# Patient Record
Sex: MALE | Race: WHITE | Employment: FULL TIME | URBAN - METROPOLITAN AREA
[De-identification: names, ages, dates, MRNs, and addresses within clinical notes are randomized per-mention and may not be internally consistent; named-entity substitution may affect disease eponyms.]

---

## 2017-02-20 DIAGNOSIS — E11.21 TYPE 2 DIABETES MELLITUS WITH DIABETIC NEPHROPATHY, WITHOUT LONG-TERM CURRENT USE OF INSULIN (H): Primary | ICD-10-CM

## 2017-02-20 NOTE — TELEPHONE ENCOUNTER
Metformin 1000mg         Last Written Prescription Date: 10/25/16  Last Fill Quantity: 60, # refills: 0  Last Office Visit with [facility], [facility] or  Health prescribing provider:  12/22/15   Next 5 appointments (look out 90 days)     Feb 24, 2017  8:20 AM CST   SHORT with Omar Parker MD   [facility] ([facility])    [address] [place] [id number] [phone]                   BP Readings from Last 3 Encounters:   08/25/16 130/84   12/30/15 128/74   12/22/15 130/87     Lab Results   Component Value Date    MICROL 21 06/24/2015     No results found for: MICROALBUMIN  Creatinine   Date Value Ref Range Status   12/29/2015 0.69 0.66 - 1.25 mg/dL Final   ]  GFR Estimate   Date Value Ref Range Status   12/29/2015 >90  Non  GFR Calc   >60 mL/min/1.7m2 Final   12/22/2015 >90  Non  GFR Calc   >60 mL/min/1.7m2 Final   06/24/2015 >90  Non  GFR Calc   >60 mL/min/1.7m2 Final     GFR Estimate If Black   Date Value Ref Range Status   12/29/2015 >90   GFR Calc   >60 mL/min/1.7m2 Final   12/22/2015 >90   GFR Calc   >60 mL/min/1.7m2 Final   06/24/2015 >90   GFR Calc   >60 mL/min/1.7m2 Final     Lab Results   Component Value Date    CHOL 181 06/24/2015     Lab Results   Component Value Date    HDL 39 06/24/2015     Lab Results   Component Value Date     06/24/2015    LDL 98 06/21/2013     Lab Results   Component Value Date    TRIG 181 06/24/2015     Lab Results   Component Value Date    CHOLHDLRATIO 4.6 06/24/2015     Lab Results   Component Value Date    AST 23 12/22/2015     Lab Results   Component Value Date    ALT 65 12/22/2015     Lab Results   Component Value Date    A1C 7.8 12/22/2015    A1C 9.6 06/24/2015    A1C 6.4 06/25/2014    A1C 5.4 10/15/2013    A1C 8.4 06/26/2013     Potassium   Date Value Ref Range Status   12/22/2015 3.8 3.4 - 5.3 mmol/L Final       Bhavana Leigh  [facility]

## 2017-02-20 NOTE — TELEPHONE ENCOUNTER
Overdue for labs, please advise on refills    Angelo Redding, Pharm D  [facility]  [phone]

## 2017-02-24 ENCOUNTER — OFFICE VISIT (OUTPATIENT)
Dept: FAMILY MEDICINE | Facility: CLINIC | Age: 47
End: 2017-02-24
Payer: COMMERCIAL

## 2017-02-24 VITALS
SYSTOLIC BLOOD PRESSURE: 130 MMHG | HEIGHT: 72 IN | TEMPERATURE: 97.6 F | BODY MASS INDEX: 29.8 KG/M2 | DIASTOLIC BLOOD PRESSURE: 86 MMHG | HEART RATE: 70 BPM | WEIGHT: 220 LBS

## 2017-02-24 DIAGNOSIS — G47.30 HYPERSOMNIA WITH SLEEP APNEA: ICD-10-CM

## 2017-02-24 DIAGNOSIS — G47.10 HYPERSOMNIA WITH SLEEP APNEA: ICD-10-CM

## 2017-02-24 DIAGNOSIS — E11.21 TYPE 2 DIABETES MELLITUS WITH DIABETIC NEPHROPATHY, WITHOUT LONG-TERM CURRENT USE OF INSULIN (H): Primary | ICD-10-CM

## 2017-02-24 DIAGNOSIS — Z13.6 CARDIOVASCULAR SCREENING; LDL GOAL LESS THAN 160: ICD-10-CM

## 2017-02-24 LAB
ANION GAP SERPL CALCULATED.3IONS-SCNC: 7 MMOL/L (ref 3–14)
BUN SERPL-MCNC: 10 MG/DL (ref 7–30)
CALCIUM SERPL-MCNC: 9.1 MG/DL (ref 8.5–10.1)
CHLORIDE SERPL-SCNC: 101 MMOL/L (ref 94–109)
CHOLEST SERPL-MCNC: 125 MG/DL
CO2 SERPL-SCNC: 28 MMOL/L (ref 20–32)
CREAT SERPL-MCNC: 0.81 MG/DL (ref 0.66–1.25)
CREAT UR-MCNC: 295 MG/DL
GFR SERPL CREATININE-BSD FRML MDRD: ABNORMAL ML/MIN/1.7M2
GLUCOSE SERPL-MCNC: 163 MG/DL (ref 70–99)
HBA1C MFR BLD: 6.7 % (ref 4.3–6)
HDLC SERPL-MCNC: 36 MG/DL
LDLC SERPL CALC-MCNC: 66 MG/DL
MICROALBUMIN UR-MCNC: 61 MG/L
MICROALBUMIN/CREAT UR: 20.78 MG/G CR (ref 0–17)
NONHDLC SERPL-MCNC: 89 MG/DL
POTASSIUM SERPL-SCNC: 4.1 MMOL/L (ref 3.4–5.3)
SODIUM SERPL-SCNC: 136 MMOL/L (ref 133–144)
TRIGL SERPL-MCNC: 117 MG/DL
TSH SERPL DL<=0.05 MIU/L-ACNC: 2.45 MU/L (ref 0.4–4)

## 2017-02-24 PROCEDURE — 83036 HEMOGLOBIN GLYCOSYLATED A1C: CPT | Performed by: FAMILY MEDICINE

## 2017-02-24 PROCEDURE — 82043 UR ALBUMIN QUANTITATIVE: CPT | Performed by: FAMILY MEDICINE

## 2017-02-24 PROCEDURE — 80061 LIPID PANEL: CPT | Performed by: FAMILY MEDICINE

## 2017-02-24 PROCEDURE — 84443 ASSAY THYROID STIM HORMONE: CPT | Performed by: FAMILY MEDICINE

## 2017-02-24 PROCEDURE — 99207 C FOOT EXAM  NO CHARGE: CPT | Performed by: FAMILY MEDICINE

## 2017-02-24 PROCEDURE — 36415 COLL VENOUS BLD VENIPUNCTURE: CPT | Performed by: FAMILY MEDICINE

## 2017-02-24 PROCEDURE — 80048 BASIC METABOLIC PNL TOTAL CA: CPT | Performed by: FAMILY MEDICINE

## 2017-02-24 PROCEDURE — 99214 OFFICE O/P EST MOD 30 MIN: CPT | Performed by: FAMILY MEDICINE

## 2017-02-24 RX ORDER — GLIPIZIDE 5 MG/1
5 TABLET, FILM COATED, EXTENDED RELEASE ORAL DAILY
Qty: 90 TABLET | Refills: 3 | Status: SHIPPED | OUTPATIENT
Start: 2017-02-24 | End: 2017-12-13

## 2017-02-24 RX ORDER — ATORVASTATIN CALCIUM 10 MG/1
10 TABLET, FILM COATED ORAL DAILY
Qty: 90 TABLET | Refills: 3 | Status: SHIPPED | OUTPATIENT
Start: 2017-02-24 | End: 2017-12-13

## 2017-02-24 NOTE — MR AVS SNAPSHOT
After Visit Summary   2/24/2017    Arvin Ta    MRN: [id number]           Patient Information     Date Of Birth          1970        Visit Information        Provider Department      2/24/2017 8:20 AM Omar Parker MD [facility]        Today's Diagnoses     Type 2 diabetes mellitus with diabetic nephropathy, without long-term current use of insulin (H)    -  1    CARDIOVASCULAR SCREENING; LDL GOAL LESS THAN 160        Hypersomnia with sleep apnea          Care Instructions    ASSESSMENT/PLAN:                                                    Lifestyle recommendations:regular exercise, at least 150 minutes per week (average 30 minutes 5 times a week)  keep working on losing weight (ideal BMI-body mass index is <25)  always wear seat belt  Diabetic recommendations:-return for follow-up visit in 6 month(s)    (E11.21) Type 2 diabetes mellitus with diabetic nephropathy, without long-term current use of insulin (H)  (primary encounter diagnosis)  Comment: doing well  Plan: metFORMIN (GLUCOPHAGE) 1000 MG tablet,         glipiZIDE (GLIPIZIDE XL) 5 MG 24 hr tablet,         blood glucose monitoring (NO BRAND SPECIFIED)         meter device kit, blood glucose monitoring (NO         BRAND SPECIFIED) test strip, blood glucose (NO         BRAND SPECIFIED) lancets standard, TSH        No change in current treatment plan.  REfills.     (Z13.6) CARDIOVASCULAR SCREENING; LDL GOAL LESS THAN 160  Comment:   Plan: atorvastatin (LIPITOR) 10 MG tablet        Refills.  Fasting blood test today.     (G47.10,  G47.30) Hypersomnia with sleep apnea  Comment: uses CPAP  Plan: No change in current treatment plan.         Follow-ups after your visit        Who to contact     If you have questions or need follow up information about today's clinic visit or your schedule please contact [facility] directly at [phone].  Normal or non-critical lab and imaging results will be  "communicated to you by MyChart, letter or phone within 4 business days after the clinic has received the results. If you do not hear from us within 7 days, please contact the clinic through Dianping or phone. If you have a critical or abnormal lab result, we will notify you by phone as soon as possible.  Submit refill requests through Dianping or call your pharmacy and they will forward the refill request to us. Please allow 3 business days for your refill to be completed.          Additional Information About Your Visit        Dianping Information     Dianping lets you send messages to your doctor, view your test results, renew your prescriptions, schedule appointments and more. To sign up, go to [email or web address] . Click on \"Log in\" on the left side of the screen, which will take you to the Welcome page. Then click on \"Sign up Now\" on the right side of the page.     You will be asked to enter the access code listed below, as well as some personal information. Please follow the directions to create your username and password.     Your access code is: [email or web address]  Expires: 2017  9:11 AM     Your access code will  in 90 days. If you need help or a new code, please call your [place] clinic or [phone].        Care EveryWhere ID     This is your Care EveryWhere ID. This could be used by other organizations to access your [place] medical records  [id number]        Your Vitals Were     Pulse Temperature Height BMI (Body Mass Index)          70 97.6  F (36.4  C) (Tympanic) 6' 0.25\" (1.835 m) 29.63 kg/m2         Blood Pressure from Last 3 Encounters:   17 130/86   16 130/84   12/30/15 128/74    Weight from Last 3 Encounters:   17 220 lb (99.8 kg)   12/29/15 218 lb (98.9 kg)   12/22/15 219 lb (99.3 kg)              We Performed the Following     Albumin Random Urine Quantitative     Basic metabolic panel     FOOT EXAM  NO CHARGE [65681.114]     HEMOGLOBIN A1C     Lipid panel reflex to " direct LDL     TSH          Today's Medication Changes          These changes are accurate as of: 2/24/17  9:11 AM.  If you have any questions, ask your nurse or doctor.               Start taking these medicines.        Dose/Directions    blood glucose lancets standard   Commonly known as:  no brand specified   Used for:  Type 2 diabetes mellitus with diabetic nephropathy, without long-term current use of insulin (H)   Started by:  Omar Parker MD        Use to test blood sugar 1 times daily or as directed.   Quantity:  1 Box   Refills:  11       blood glucose monitoring meter device kit   Commonly known as:  no brand specified   Used for:  Type 2 diabetes mellitus with diabetic nephropathy, without long-term current use of insulin (H)   Started by:  Omar Parker MD        Use to test blood sugar 1 times daily or as directed.   Quantity:  1 kit   Refills:  0         These medicines have changed or have updated prescriptions.        Dose/Directions    atorvastatin 10 MG tablet   Commonly known as:  LIPITOR   This may have changed:  additional instructions   Used for:  CARDIOVASCULAR SCREENING; LDL GOAL LESS THAN 160   Changed by:  Omar Parker MD        Dose:  10 mg   Take 1 tablet (10 mg) by mouth daily   Quantity:  90 tablet   Refills:  3       blood glucose monitoring test strip   Commonly known as:  no brand specified   This may have changed:  additional instructions   Used for:  Type 2 diabetes mellitus with diabetic nephropathy, without long-term current use of insulin (H)   Changed by:  Omar Parker MD        Use to test blood sugars 1 times daily or as directed   Quantity:  100 strip   Refills:  11       glipiZIDE 5 MG 24 hr tablet   Commonly known as:  glipiZIDE XL   This may have changed:  additional instructions   Used for:  Type 2 diabetes mellitus with diabetic nephropathy, without long-term current use of insulin (H)   Changed by:  Omar Parker MD        Dose:  5 mg   Take  1 tablet (5 mg) by mouth daily   Quantity:  90 tablet   Refills:  3         Stop taking these medicines if you haven't already. Please contact your care team if you have questions.     blood glucose monitoring lancets   Stopped by:  Omar Parker MD                Where to get your medicines      These medications were sent to [facility] - [address]  [address] [facility] [address]     Phone:  [phone]     atorvastatin 10 MG tablet    glipiZIDE 5 MG 24 hr tablet    metFORMIN 1000 MG tablet         Some of these will need a paper prescription and others can be bought over the counter.  Ask your nurse if you have questions.     Bring a paper prescription for each of these medications     blood glucose lancets standard    blood glucose monitoring meter device kit    blood glucose monitoring test strip                Primary Care Provider Office Phone # Fax #    Omar Parker [phone]       [facility] [address] [facility] [address]        Thank you!     Thank you for choosing [facility]  for your care. Our goal is always to provide you with excellent care. Hearing back from our patients is one way we can continue to improve our services. Please take a few minutes to complete the written survey that you may receive in the mail after your visit with us. Thank you!             Your Updated Medication List - Protect others around you: Learn how to safely use, store and throw away your medicines at www.disposemymeds.org.          This list is accurate as of: 2/24/17  9:11 AM.  Always use your most recent med list.                   Brand Name Dispense Instructions for use    ACE/ARB NOT PRESCRIBED (INTENTIONAL)      ACE & ARB not prescribed due to {CHOOSE REASON BEFORE SIGNING!!!:777587}       ACE/ARB NOT PRESCRIBED (INTENTIONAL)      1 each once ACE & ARB not prescribed due to Other: No CKD or  hypertension       atorvastatin 10 MG tablet    LIPITOR    90 tablet    Take 1 tablet (10 mg) by mouth daily       blood glucose lancets standard    no brand specified    1 Box    Use to test blood sugar 1 times daily or as directed.       blood glucose monitoring meter device kit    no brand specified    1 kit    Use to test blood sugar 1 times daily or as directed.       blood glucose monitoring test strip    no brand specified    100 strip    Use to test blood sugars 1 times daily or as directed       calcipotriene 0.005 % Oint     120 g    Apply daily to affected areas on body in the evening.       cetirizine 10 MG tablet    zyrTEC    30 tablet    Take 10 mg by mouth daily as needed for allergies (Summer Months (hayfever))       clobetasol 0.05 % ointment    TEMOVATE    120 g    Apply topically daily Apply to Elbows, Legs, and Knees for Psoriasis in the morning.       glipiZIDE 5 MG 24 hr tablet    glipiZIDE XL    90 tablet    Take 1 tablet (5 mg) by mouth daily       metFORMIN 1000 MG tablet    GLUCOPHAGE    180 tablet    Take 1 tablet (1,000 mg) by mouth 2 times daily (with meals)       metroNIDAZOLE 0.75 % cream    METROCREAM     Apply topically daily as needed (face)

## 2017-02-24 NOTE — PROGRESS NOTES
SUBJECTIVE:                                                    Arvin Ta is a 46 year old male who presents to clinic today for the following health issues:  Chief Complaint   Patient presents with     Diabetes        Diabetes Follow-up      Patient is checking blood sugars: 2-6 x a day.  Range in the past month has been .  Typically around 145 in the AM.  It can be 140-180 1-2 hours after a meal.     Diabetic concerns: None     Symptoms of hypoglycemia (low blood sugar): 3 x in the last year sweaty and shakey         Paresthesias (numbness or burning in feet) or sores: No     Date of last diabetic eye exam: 03/2016     No other health concerns today.       Amount of exercise or physical activity: not in winter    Problems taking medications regularly: No    Medication side effects: none    Last clinic visit:6/30/2015  Medication or other changes at last visit:recommended metformin on a regular basis  Weight since last visit?  Up 1#  Wt Readings from Last 5 Encounters:   02/24/17 220 lb (99.8 kg)   12/29/15 218 lb (98.9 kg)   12/22/15 219 lb (99.3 kg)   06/30/15 219 lb (99.3 kg)   06/24/15 218 lb (98.9 kg)      History   Smoking Status     Never Smoker   Smokeless Tobacco     Never Used       Past medical history reviewed and updated    Patient Active Problem List    Diagnosis Date Noted     History of arthroplasty of left knee 12/29/2015     Priority: Medium     Type 2 diabetes mellitus with diabetic nephropathy (H) 06/22/2015     Priority: Medium     Diagnosis in June, 2013.        CARDIOVASCULAR SCREENING; LDL GOAL LESS THAN 160 10/31/2010     Priority: Medium     Hypersomnia with sleep apnea 08/29/2006     Priority: Medium     On CPAP January 2005.  6/24/2015:Uses CPAP every night.   Problem list name updated by automated process. Provider to review       Other psoriasis 08/29/2006     Priority: Medium     Other tear of cartilage or meniscus of knee, current 08/29/2006     Priority: Medium      "posterior medial on left       ROS:General: No change in weight, sleep or appetite.  Normal energy.  No fever or chills  Resp: No coughing, wheezing or shortness of breath  CV: No chest pains or palpitations  GI: No nausea, vomiting,  heartburn, abdominal pain, diarrhea, constipation or change in bowel habits  : No urinary frequency or dysuria, bladder or kidney problems  Musculoskeletal: No significant muscle or joint pains  Psychiatric: No problems with anxiety, depression or mental health    OBJECTIVE:Blood pressure 130/86, pulse 70, temperature 97.6  F (36.4  C), temperature source Tympanic, height 6' 0.25\" (1.835 m), weight 220 lb (99.8 kg). BMI=Body mass index is 29.63 kg/(m^2).  GENERAL APPEARANCE ADULT: Alert, no acute distress  NECK: No adenopathy,masses or thyromegaly  RESP: lungs clear to auscultation   CV: normal rate, regular rhythm, no murmur or gallop  ABDOMEN: soft, no organomegaly, masses or tenderness  MS: extremities normal, no peripheral edema   Foot exam:normal DP and PT pulses, no trophic changes or ulcerative lesions and normal monofilament exam    ASSESSMENT/PLAN:                                                    Lifestyle recommendations:regular exercise, at least 150 minutes per week (average 30 minutes 5 times a week)  keep working on losing weight (ideal BMI-body mass index is <25)  always wear seat belt  Diabetic recommendations:-return for follow-up visit in 6 month(s)    (E11.21) Type 2 diabetes mellitus with diabetic nephropathy, without long-term current use of insulin (H)  (primary encounter diagnosis)  Comment: doing well  Plan: metFORMIN (GLUCOPHAGE) 1000 MG tablet,         glipiZIDE (GLIPIZIDE XL) 5 MG 24 hr tablet,         blood glucose monitoring (NO BRAND SPECIFIED)         meter device kit, blood glucose monitoring (NO         BRAND SPECIFIED) test strip, blood glucose (NO         BRAND SPECIFIED) lancets standard, TSH        No change in current treatment plan.  REfills. "     (Z13.6) CARDIOVASCULAR SCREENING; LDL GOAL LESS THAN 160  Comment:   Plan: atorvastatin (LIPITOR) 10 MG tablet        Refills.  Fasting blood test today.     (G47.10,  G47.30) Hypersomnia with sleep apnea  Comment: uses CPAP  Plan: No change in current treatment plan.        Diabetes Type II, A1c Controlled, non-insulin dependent   Associated with the following complications    Renal Complications:  CKD

## 2017-02-24 NOTE — PATIENT INSTRUCTIONS
ASSESSMENT/PLAN:                                                    Lifestyle recommendations:regular exercise, at least 150 minutes per week (average 30 minutes 5 times a week)  keep working on losing weight (ideal BMI-body mass index is <25)  always wear seat belt  Diabetic recommendations:-return for follow-up visit in 6 month(s)    (E11.21) Type 2 diabetes mellitus with diabetic nephropathy, without long-term current use of insulin (H)  (primary encounter diagnosis)  Comment: doing well  Plan: metFORMIN (GLUCOPHAGE) 1000 MG tablet,         glipiZIDE (GLIPIZIDE XL) 5 MG 24 hr tablet,         blood glucose monitoring (NO BRAND SPECIFIED)         meter device kit, blood glucose monitoring (NO         BRAND SPECIFIED) test strip, blood glucose (NO         BRAND SPECIFIED) lancets standard, TSH        No change in current treatment plan.  REfills.     (Z13.6) CARDIOVASCULAR SCREENING; LDL GOAL LESS THAN 160  Comment:   Plan: atorvastatin (LIPITOR) 10 MG tablet        Refills.  Fasting blood test today.     (G47.10,  G47.30) Hypersomnia with sleep apnea  Comment: uses CPAP  Plan: No change in current treatment plan.

## 2017-02-24 NOTE — NURSING NOTE
Chief Complaint   Patient presents with     Diabetes     There were no vitals taken for this visit. Estimated body mass index is 29.57 kg/(m^2) as calculated from the following:    Height as of 12/29/15: 6' (1.829 m).    Weight as of 12/29/15: 218 lb (98.9 kg).  bp completed using cuff size: regular      Health Maintenance that is potentially due pending provider review:  NONE    n/a

## 2017-02-24 NOTE — PROGRESS NOTES
Please call.  Urine tests show positive MAC (microalbumin/creatinine ratio) meaning protein leaking from the kidneys into the urine. This may be a sign of some kidney damage from the diabetes mellitus.  TSH is normal indicating that thyroid function is normal. The blood chemistries (Basic metabolic panel) are all normal including electrolytes (salt balances in the blood) and kidney tests.  Glucose a little high.    HDL is low, other lipids all good.   HgbA1C is OK indicating reasonable diabetic control.     PLAN: No new changes in treatment recommended.

## 2017-06-26 DIAGNOSIS — E11.21 TYPE 2 DIABETES MELLITUS WITH DIABETIC NEPHROPATHY, WITHOUT LONG-TERM CURRENT USE OF INSULIN (H): ICD-10-CM

## 2017-06-26 NOTE — TELEPHONE ENCOUNTER
metFORMIN (GLUCOPHAGE) 1000 MG tablet         Last Written Prescription Date: 2/24/17  Last Fill Quantity: 180, # refills: 3  Last Office Visit with [facility], [facility] or [facility] prescribing provider:  2/24/17        BP Readings from Last 3 Encounters:   02/24/17 130/86   08/25/16 130/84   12/30/15 128/74     Lab Results   Component Value Date    MICROL 61 02/24/2017     Lab Results   Component Value Date    UMALCR 20.78 02/24/2017     Creatinine   Date Value Ref Range Status   02/24/2017 0.81 0.66 - 1.25 mg/dL Final   ]  GFR Estimate   Date Value Ref Range Status   02/24/2017 >90  Non  GFR Calc   >60 mL/min/1.7m2 Final   12/29/2015 >90  Non  GFR Calc   >60 mL/min/1.7m2 Final   12/22/2015 >90  Non  GFR Calc   >60 mL/min/1.7m2 Final     GFR Estimate If Black   Date Value Ref Range Status   02/24/2017 >90   GFR Calc   >60 mL/min/1.7m2 Final   12/29/2015 >90   GFR Calc   >60 mL/min/1.7m2 Final   12/22/2015 >90   GFR Calc   >60 mL/min/1.7m2 Final     Lab Results   Component Value Date    CHOL 125 02/24/2017     Lab Results   Component Value Date    HDL 36 02/24/2017     Lab Results   Component Value Date    LDL 66 02/24/2017     Lab Results   Component Value Date    TRIG 117 02/24/2017     Lab Results   Component Value Date    CHOLHDLRATIO 4.6 06/24/2015     Lab Results   Component Value Date    AST 23 12/22/2015     Lab Results   Component Value Date    ALT 65 12/22/2015     Lab Results   Component Value Date    A1C 6.7 02/24/2017    A1C 7.8 12/22/2015    A1C 9.6 06/24/2015    A1C 6.4 06/25/2014    A1C 5.4 10/15/2013     Potassium   Date Value Ref Range Status   02/24/2017 4.1 3.4 - 5.3 mmol/L Final

## 2017-09-06 ENCOUNTER — OFFICE VISIT (OUTPATIENT)
Dept: FAMILY MEDICINE | Facility: CLINIC | Age: 47
End: 2017-09-06
Payer: COMMERCIAL

## 2017-09-06 VITALS
DIASTOLIC BLOOD PRESSURE: 90 MMHG | SYSTOLIC BLOOD PRESSURE: 146 MMHG | BODY MASS INDEX: 29.8 KG/M2 | WEIGHT: 220 LBS | HEIGHT: 72 IN

## 2017-09-06 DIAGNOSIS — S81.802A OPEN WOUND OF LOWER LIMB, LEFT, INITIAL ENCOUNTER: Primary | ICD-10-CM

## 2017-09-06 PROCEDURE — 99213 OFFICE O/P EST LOW 20 MIN: CPT | Performed by: FAMILY MEDICINE

## 2017-09-06 NOTE — PROGRESS NOTES
"  SUBJECTIVE:   Arvin Ta is a 47 year old male who presents to clinic today for the following health issues:  Chief Complaint   Patient presents with     Trauma     Blood Draw     A1C is due      Injury to left lower leg      Duration: 2.5 weeks    Description (location/character/radiation): Was playing ball and hit in leg with spike of a shoe    Intensity:  0-1/10  Burning sensation at times.    Accompanying signs and symptoms: Denies fever or chills. Has noted some drainage but is getting better.  ? Slight swelling    History (similar episodes/previous evaluation): None    Precipitating or alleviating factors: None    Therapies tried and outcome: Ice at first and has tried Ibuprofen.      Spiked with plastic soccer shoe type cleat.   He has psoriasis.  Not using the clobetasol on that injured leg.   No drainage.  Not tender.     Last clinic visit: 2/24/2017 for diabetes mellitus recheck.  No changes made at that visit.   He has been checking glucometers.    He is in a hurry today and would like to recheck diabetes mellitus in a month.     Patient Active Problem List   Diagnosis     Hypersomnia with sleep apnea     Other psoriasis     Other tear of cartilage or meniscus of knee, current     CARDIOVASCULAR SCREENING; LDL GOAL LESS THAN 160     Type 2 diabetes mellitus with diabetic nephropathy (H)     History of arthroplasty of left knee      OBJECTIVE:Blood pressure 146/90, height 6' 0.25\" (1.835 m), weight 220 lb (99.8 kg). BMI=Body mass index is 29.63 kg/(m^2).  GENERAL APPEARANCE ADULT: Alert, no acute distress  SKIN: HE has scattered psoriasis lesions.    Anterior left lower leg with erythematous patch consistent with his psoriasis.    There are two ulcers along anterior tibial surface superior [id number] and inferior 16X8mm with hemorrhagic crust, dry and non tender.      ASSESSMENT:   (C24.307O) Open wound of lower limb, left, initial encounter  (primary encounter diagnosis)  Comment: healing oK, no sign " of infection.   Plan: No antibiotics needed.  Can leave undressed as not draining.    Recheck if increasing erythema or tenderness.     follow-up diabetes mellitus in the next month.

## 2017-09-06 NOTE — MR AVS SNAPSHOT
"              After Visit Summary   2017    Arvin Ta    MRN: [id number]           Patient Information     Date Of Birth          1970        Visit Information        Provider Department      2017 10:00 AM Omar Parker MD [facility]        Today's Diagnoses     Open wound of lower limb, left, initial encounter    -  1       Follow-ups after your visit        Who to contact     If you have questions or need follow up information about today's clinic visit or your schedule please contact [facility] directly at [phone].  Normal or non-critical lab and imaging results will be communicated to you by [employer], letter or phone within 4 business days after the clinic has received the results. If you do not hear from us within 7 days, please contact the clinic through [employer] or phone. If you have a critical or abnormal lab result, we will notify you by phone as soon as possible.  Submit refill requests through [employer] or call your pharmacy and they will forward the refill request to us. Please allow 3 business days for your refill to be completed.          Additional Information About Your Visit        MyChart Information     [employer] lets you send messages to your doctor, view your test results, renew your prescriptions, schedule appointments and more. To sign up, go to [email or web address] . Click on \"Log in\" on the left side of the screen, which will take you to the Welcome page. Then click on \"Sign up Now\" on the right side of the page.     You will be asked to enter the access code listed below, as well as some personal information. Please follow the directions to create your username and password.     Your access code is: [email or web address]  Expires: 2017 12:31 PM     Your access code will  in 90 days. If you need help or a new code, please call your [facility] or [phone].        Care EveryWhere ID     This is your Care EveryWhere ID. " "This could be used by other organizations to access your [facility] medical records  [id number]        Your Vitals Were     Height BMI (Body Mass Index)                6' 0.25\" (1.835 m) 29.63 kg/m2           Blood Pressure from Last 3 Encounters:   09/06/17 146/90   02/24/17 130/86   08/25/16 130/84    Weight from Last 3 Encounters:   09/06/17 220 lb (99.8 kg)   02/24/17 220 lb (99.8 kg)   12/29/15 218 lb (98.9 kg)              Today, you had the following     No orders found for display       Primary Care Provider Office Phone # Fax #    Omar Parker [phone] [address] [place] [id number]        Equal Access to Services     MARIAH REED : Hadii emmett patel Soluis, waaxda luqadaha, qaybta kaalmada adeegyada, sharon díaz . So [facility] [phone].    ATENCIÓN: Si habla español, tiene a silverio disposición servicios gratuitos de asistencia lingüística. Llame al [phone].    We comply with applicable federal civil rights laws and [place] laws. We do not discriminate on the basis of race, color, national origin, age, disability sex, sexual orientation or gender identity.            Thank you!     Thank you for choosing [facility]  for your care. Our goal is always to provide you with excellent care. Hearing back from our patients is one way we can continue to improve our services. Please take a few minutes to complete the written survey that you may receive in the mail after your visit with us. Thank you!             Your Updated Medication List - Protect others around you: Learn how to safely use, store and throw away your medicines at www.disposemymeds.org.          This list is accurate as of: 9/6/17 12:31 PM.  Always use your most recent med list.                   Brand Name Dispense Instructions for use Diagnosis    ACE/ARB NOT PRESCRIBED (INTENTIONAL)      1 each once ACE & ARB not prescribed due to Other: No CKD or " hypertension        atorvastatin 10 MG tablet    LIPITOR    90 tablet    Take 1 tablet (10 mg) by mouth daily    CARDIOVASCULAR SCREENING; LDL GOAL LESS THAN 160       blood glucose lancets standard    no brand specified    1 Box    Use to test blood sugar 1 times daily or as directed.    Type 2 diabetes mellitus with diabetic nephropathy, without long-term current use of insulin (H)       blood glucose monitoring meter device kit    no brand specified    1 kit    Use to test blood sugar 1 times daily or as directed.    Type 2 diabetes mellitus with diabetic nephropathy, without long-term current use of insulin (H)       blood glucose monitoring test strip    no brand specified    100 strip    Use to test blood sugars 1 times daily or as directed    Type 2 diabetes mellitus with diabetic nephropathy, without long-term current use of insulin (H)       calcipotriene 0.005 % Oint     120 g    Apply daily to affected areas on body in the evening.    Other psoriasis       cetirizine 10 MG tablet    zyrTEC    30 tablet    Take 10 mg by mouth daily as needed for allergies (Summer Months (hayfever))        clobetasol 0.05 % ointment    TEMOVATE    120 g    Apply topically daily Apply to Elbows, Legs, and Knees for Psoriasis in the morning.    Other psoriasis       glipiZIDE 5 MG 24 hr tablet    glipiZIDE XL    90 tablet    Take 1 tablet (5 mg) by mouth daily    Type 2 diabetes mellitus with diabetic nephropathy, without long-term current use of insulin (H)       metFORMIN 1000 MG tablet    GLUCOPHAGE    180 tablet    Take 1 tablet (1,000 mg) by mouth 2 times daily (with meals)    Type 2 diabetes mellitus with diabetic nephropathy, without long-term current use of insulin (H)       metroNIDAZOLE 0.75 % cream    METROCREAM     Apply topically daily as needed (face)

## 2017-09-06 NOTE — NURSING NOTE
"Chief Complaint   Patient presents with     Trauma     Blood Draw     A1C is due       Initial /90  Ht 6' 0.25\" (1.835 m)  Wt 220 lb (99.8 kg)  BMI 29.63 kg/m2 Estimated body mass index is 29.63 kg/(m^2) as calculated from the following:    Height as of this encounter: 6' 0.25\" (1.835 m).    Weight as of this encounter: 220 lb (99.8 kg).  Medication Reconciliation: complete    Health Maintenance that is potentially due pending provider review:  Blood pressure    Possibly completing today per provider review.    Is there anyone who you would like to be able to receive your results? No  If yes have patient fill out MARINO    "

## 2017-12-13 ENCOUNTER — OFFICE VISIT (OUTPATIENT)
Dept: FAMILY MEDICINE | Facility: CLINIC | Age: 47
End: 2017-12-13
Payer: COMMERCIAL

## 2017-12-13 VITALS
DIASTOLIC BLOOD PRESSURE: 90 MMHG | BODY MASS INDEX: 28.76 KG/M2 | HEART RATE: 72 BPM | HEIGHT: 73 IN | TEMPERATURE: 99.2 F | SYSTOLIC BLOOD PRESSURE: 142 MMHG | RESPIRATION RATE: 12 BRPM | WEIGHT: 217 LBS

## 2017-12-13 DIAGNOSIS — Z13.6 CARDIOVASCULAR SCREENING; LDL GOAL LESS THAN 160: ICD-10-CM

## 2017-12-13 DIAGNOSIS — E11.21 TYPE 2 DIABETES MELLITUS WITH DIABETIC NEPHROPATHY, WITHOUT LONG-TERM CURRENT USE OF INSULIN (H): Primary | ICD-10-CM

## 2017-12-13 LAB
ANION GAP SERPL CALCULATED.3IONS-SCNC: 10 MMOL/L (ref 3–14)
BUN SERPL-MCNC: 10 MG/DL (ref 7–30)
CALCIUM SERPL-MCNC: 9.4 MG/DL (ref 8.5–10.1)
CHLORIDE SERPL-SCNC: 101 MMOL/L (ref 94–109)
CHOLEST SERPL-MCNC: 136 MG/DL
CO2 SERPL-SCNC: 27 MMOL/L (ref 20–32)
CREAT SERPL-MCNC: 0.74 MG/DL (ref 0.66–1.25)
CREAT UR-MCNC: 154 MG/DL
GFR SERPL CREATININE-BSD FRML MDRD: >90 ML/MIN/1.7M2
GLUCOSE SERPL-MCNC: 142 MG/DL (ref 70–99)
HBA1C MFR BLD: 6.3 % (ref 4.3–6)
HDLC SERPL-MCNC: 48 MG/DL
LDLC SERPL CALC-MCNC: 69 MG/DL
MICROALBUMIN UR-MCNC: 38 MG/L
MICROALBUMIN/CREAT UR: 24.61 MG/G CR (ref 0–17)
NONHDLC SERPL-MCNC: 88 MG/DL
POTASSIUM SERPL-SCNC: 4.5 MMOL/L (ref 3.4–5.3)
SODIUM SERPL-SCNC: 138 MMOL/L (ref 133–144)
TRIGL SERPL-MCNC: 93 MG/DL

## 2017-12-13 PROCEDURE — 36415 COLL VENOUS BLD VENIPUNCTURE: CPT | Performed by: FAMILY MEDICINE

## 2017-12-13 PROCEDURE — 83036 HEMOGLOBIN GLYCOSYLATED A1C: CPT | Performed by: FAMILY MEDICINE

## 2017-12-13 PROCEDURE — 80048 BASIC METABOLIC PNL TOTAL CA: CPT | Performed by: FAMILY MEDICINE

## 2017-12-13 PROCEDURE — 80061 LIPID PANEL: CPT | Performed by: FAMILY MEDICINE

## 2017-12-13 PROCEDURE — 82043 UR ALBUMIN QUANTITATIVE: CPT | Performed by: FAMILY MEDICINE

## 2017-12-13 PROCEDURE — 99214 OFFICE O/P EST MOD 30 MIN: CPT | Performed by: FAMILY MEDICINE

## 2017-12-13 RX ORDER — ATORVASTATIN CALCIUM 10 MG/1
10 TABLET, FILM COATED ORAL DAILY
Qty: 90 TABLET | Refills: 3 | Status: SHIPPED | OUTPATIENT
Start: 2017-12-13 | End: 2019-03-03

## 2017-12-13 RX ORDER — GLIPIZIDE 5 MG/1
5 TABLET, FILM COATED, EXTENDED RELEASE ORAL DAILY
Qty: 90 TABLET | Refills: 3 | Status: SHIPPED | OUTPATIENT
Start: 2017-12-13 | End: 2019-03-03

## 2017-12-13 NOTE — PROGRESS NOTES
SUBJECTIVE:   Arvin aT is a 47 year old male who presents to clinic today for the following health issues:  Chief Complaint   Patient presents with     Diabetes     Discuss MAC     Labs Only     ? MAC and BMP last done 02/2017.        Diabetes Follow-up    Patient is checking blood sugars: 3-5 x  times daily.     Blood sugar testing frequency justification: Patient modifying lifestyle changes (diet, exercise) with blood sugars  Results are as follows:[id number]=[id number].         am - 120-160       After lunch - 160       supper - up to 210       bedtime - 130-160    Diabetic concerns: Diet and blood sugar     Symptoms of hypoglycemia (low blood sugar): none for awhile     Paresthesias (numbness or burning in feet) or sores: No     Date of last diabetic eye exam: 1 yr  BP Readings from Last 2 Encounters:   12/13/17 142/90   09/06/17 146/90     Hemoglobin A1C (%)   Date Value   12/13/2017 6.3 (H)   02/24/2017 6.7 (H)     LDL Cholesterol Calculated (mg/dL)   Date Value   02/24/2017 66   06/24/2015 106         Amount of exercise or physical activity: not much    Problems taking medications regularly: No    Medication side effects: none    Last clinic visit:2/24/2017  Medication or other changes at last visit:none.  He had positive MAC.   Weight since last visit?  Down 3#  Wt Readings from Last 5 Encounters:   12/13/17 217 lb (98.4 kg)   09/06/17 220 lb (99.8 kg)   02/24/17 220 lb (99.8 kg)   12/29/15 218 lb (98.9 kg)   12/22/15 219 lb (99.3 kg)       History   Smoking Status     Never Smoker   Smokeless Tobacco     Never Used       Past medical history reviewed and updated    Patient Active Problem List    Diagnosis Date Noted     History of arthroplasty of left knee 12/29/2015     Priority: Medium     Type 2 diabetes mellitus with diabetic nephropathy (H) 06/22/2015     Priority: Medium     Diagnosis in June, 2013.        CARDIOVASCULAR SCREENING; LDL GOAL LESS THAN 160 10/31/2010     Priority: Medium     Hypersomnia  with sleep apnea 08/29/2006     Priority: Medium     On CPAP January 2005.  6/24/2015:Uses CPAP every night.          Other psoriasis 08/29/2006     Priority: Medium     Other tear of cartilage or meniscus of knee, current 08/29/2006     Priority: Medium     posterior medial on left       Current Outpatient Prescriptions   Medication Sig Dispense Refill     metFORMIN (GLUCOPHAGE) 1000 MG tablet Take 1 tablet (1,000 mg) by mouth 2 times daily (with meals) 180 tablet 3     glipiZIDE (GLIPIZIDE XL) 5 MG 24 hr tablet Take 1 tablet (5 mg) by mouth daily 90 tablet 3     atorvastatin (LIPITOR) 10 MG tablet Take 1 tablet (10 mg) by mouth daily 90 tablet 3     blood glucose monitoring (NO BRAND SPECIFIED) meter device kit Use to test blood sugar 1 times daily or as directed. 1 kit 0     blood glucose monitoring (NO BRAND SPECIFIED) test strip Use to test blood sugars 1 times daily or as directed 100 strip 11     blood glucose (NO BRAND SPECIFIED) lancets standard Use to test blood sugar 1 times daily or as directed. 1 Box 11     clobetasol (TEMOVATE) 0.05 % ointment Apply topically daily Apply to Elbows, Legs, and Knees for Psoriasis in the morning. 120 g 4     calcipotriene 0.005 % OINT Apply daily to affected areas on body in the evening. 120 g 4     metroNIDAZOLE (METROCREAM) 0.75 % cream Apply topically daily as needed (face)       ACE/ARB NOT PRESCRIBED, INTENTIONAL, 1 each once ACE & ARB not prescribed due to Other: No CKD or hypertension       cetirizine (ZYRTEC) 10 MG tablet Take 10 mg by mouth daily as needed for allergies (Summer Months (hayfever))  30 tablet 1     ROS:General: No change in weight, sleep or appetite.  Normal energy.  No fever or chills  Resp: No coughing, wheezing or shortness of breath  CV: No chest pains or palpitations  GI: No nausea, vomiting,  heartburn, abdominal pain, diarrhea, constipation or change in bowel habits  : No urinary frequency or dysuria, bladder or kidney  "problems  Musculoskeletal: No significant muscle or joint pains  Psychiatric: No problems with anxiety, depression or mental health    OBJECTIVE:Blood pressure 142/90, pulse 72, temperature 99.2  F (37.3  C), temperature source Tympanic, resp. rate 12, height 6' 0.5\" (1.842 m), weight 217 lb (98.4 kg). BMI=Body mass index is 29.03 kg/(m^2).  GENERAL APPEARANCE ADULT: Alert, no acute distress  NECK: No adenopathy,masses or thyromegaly  RESP: lungs clear to auscultation   CV: normal rate, regular rhythm, no murmur or gallop  ABDOMEN: soft, no organomegaly, masses or tenderness  MS: extremities normal, no peripheral edema   Foot exam:normal DP and PT pulses, no trophic changes or ulcerative lesions and normal monofilament exam    ASSESSMENT/PLAN:                                                    Lifestyle recommendations:regular exercise, at least 150 minutes per week (average 30 minutes 5 times a week)  keep working on losing weight (ideal BMI-body mass index is <25)  Diabetic recommendations:-return for follow-up visit in 6 month(s)    (E11.21) Type 2 diabetes mellitus with diabetic nephropathy, without long-term current use of insulin (H)  (primary encounter diagnosis)  Comment: doing well with current medications.   Plan: metFORMIN (GLUCOPHAGE) 1000 MG tablet,         glipiZIDE (GLIPIZIDE XL) 5 MG 24 hr tablet,         blood glucose monitoring (NO BRAND SPECIFIED)         test strip, blood glucose (NO BRAND SPECIFIED)         lancets standard, Albumin Random Urine         Quantitative with Creat Ratio, Basic metabolic         panel          REgular exercise and weight loss can help improve diabetes mellitus control and lessen need for medications.  I think you need to stay on current medications.     Good control of diabetes mellitus, blood pressure <140/90 and the ACE category drugs like lisinopril can all help prevent further kidney damage.   If you have positive microalbumin (protein in urine) or if blood " pressure is consistently over 140/90, I will recommend adding lisinopril to control blood pressure and prevent kidney damage.     Check BP several times in the next few weeks.  This can be done at home, in clinic, at our pharmacy, at a store or by neighbor or relative with a blood pressure cuff.  You should be sitting and relaxed for several minutes when taking blood pressure.   If BP readings are persistently over 140/90, I recommend a clinic visit for further evaluation and treatment.  Normal blood pressure is 120/80.  Usually high blood pressure does not cause any symptoms but over time can lead to eye and kidney problems.  High blood pressure also increases the chances of having a heart attack or stroke.     Let us know if readings are often high, so we can help get your blood pressure under good control.     (Z13.6) CARDIOVASCULAR SCREENING; LDL GOAL LESS THAN 160  Comment: due for recheck  Plan: atorvastatin (LIPITOR) 10 MG tablet, Lipid         panel reflex to direct LDL Fasting        Fasting blood tests today.        Diabetes Type II, A1c Controlled, non-insulin dependent   Associated with the following complications:none

## 2017-12-13 NOTE — NURSING NOTE
"Chief Complaint   Patient presents with     Diabetes     Discuss MAC       Initial /90  Pulse 72  Temp 99.2  F (37.3  C) (Tympanic)  Resp 12  Ht 6' 0.5\" (1.842 m)  Wt 217 lb (98.4 kg)  BMI 29.03 kg/m2 Estimated body mass index is 29.03 kg/(m^2) as calculated from the following:    Height as of this encounter: 6' 0.5\" (1.842 m).    Weight as of this encounter: 217 lb (98.4 kg).  Medication Reconciliation: complete    Health Maintenance that is potentially due pending provider review:          Is there anyone who you would like to be able to receive your results?   If yes have patient fill out MARINO    "

## 2017-12-13 NOTE — PATIENT INSTRUCTIONS
ASSESSMENT/PLAN:                                                    Lifestyle recommendations:regular exercise, at least 150 minutes per week (average 30 minutes 5 times a week)  keep working on losing weight (ideal BMI-body mass index is <25)  Diabetic recommendations:-return for follow-up visit in 6 month(s)    (E11.21) Type 2 diabetes mellitus with diabetic nephropathy, without long-term current use of insulin (H)  (primary encounter diagnosis)  Comment: doing well with current medications.   Plan: metFORMIN (GLUCOPHAGE) 1000 MG tablet,         glipiZIDE (GLIPIZIDE XL) 5 MG 24 hr tablet,         blood glucose monitoring (NO BRAND SPECIFIED)         test strip, blood glucose (NO BRAND SPECIFIED)         lancets standard, Albumin Random Urine         Quantitative with Creat Ratio, Basic metabolic         panel          REgular exercise and weight loss can help improve diabetes mellitus control and lessen need for medications.  I think you need to stay on current medications.     Good control of diabetes mellitus, blood pressure <140/90 and the ACE category drugs like lisinopril can all help prevent further kidney damage.   If you have positive microalbumin (protein in urine) or if blood pressure is consistently over 140/90, I will recommend adding lisinopril to control blood pressure and prevent kidney damage.     Check BP several times in the next few weeks.  This can be done at home, in clinic, at our pharmacy, at a store or by neighbor or relative with a blood pressure cuff.  You should be sitting and relaxed for several minutes when taking blood pressure.   If BP readings are persistently over 140/90, I recommend a clinic visit for further evaluation and treatment.  Normal blood pressure is 120/80.  Usually high blood pressure does not cause any symptoms but over time can lead to eye and kidney problems.  High blood pressure also increases the chances of having a heart attack or stroke.     Let us know if  readings are often high, so we can help get your blood pressure under good control.     (Z13.6) CARDIOVASCULAR SCREENING; LDL GOAL LESS THAN 160  Comment: due for recheck  Plan: atorvastatin (LIPITOR) 10 MG tablet, Lipid         panel reflex to direct LDL Fasting        Fasting blood tests today.

## 2017-12-13 NOTE — MR AVS SNAPSHOT
After Visit Summary   12/13/2017    Arvin Ta    MRN: [id number]           Patient Information     Date Of Birth          1970        Visit Information        Provider Department      12/13/2017 8:20 AM Omar Parker MD [facility]        Today's Diagnoses     Type 2 diabetes mellitus with diabetic nephropathy, without long-term current use of insulin (H)    -  1    CARDIOVASCULAR SCREENING; LDL GOAL LESS THAN 160          Care Instructions    ASSESSMENT/PLAN:                                                    Lifestyle recommendations:regular exercise, at least 150 minutes per week (average 30 minutes 5 times a week)  keep working on losing weight (ideal BMI-body mass index is <25)  Diabetic recommendations:-return for follow-up visit in 6 month(s)    (E11.21) Type 2 diabetes mellitus with diabetic nephropathy, without long-term current use of insulin (H)  (primary encounter diagnosis)  Comment: doing well with current medications.   Plan: metFORMIN (GLUCOPHAGE) 1000 MG tablet,         glipiZIDE (GLIPIZIDE XL) 5 MG 24 hr tablet,         blood glucose monitoring (NO BRAND SPECIFIED)         test strip, blood glucose (NO BRAND SPECIFIED)         lancets standard, Albumin Random Urine         Quantitative with Creat Ratio, Basic metabolic         panel          REgular exercise and weight loss can help improve diabetes mellitus control and lessen need for medications.  I think you need to stay on current medications.     Good control of diabetes mellitus, blood pressure <140/90 and the ACE category drugs like lisinopril can all help prevent further kidney damage.   If you have positive microalbumin (protein in urine) or if blood pressure is consistently over 140/90, I will recommend adding lisinopril to control blood pressure and prevent kidney damage.     Check BP several times in the next few weeks.  This can be done at home, in clinic, at our pharmacy, at a store or by  "neighbor or relative with a blood pressure cuff.  You should be sitting and relaxed for several minutes when taking blood pressure.   If BP readings are persistently over 140/90, I recommend a clinic visit for further evaluation and treatment.  Normal blood pressure is 120/80.  Usually high blood pressure does not cause any symptoms but over time can lead to eye and kidney problems.  High blood pressure also increases the chances of having a heart attack or stroke.     Let us know if readings are often high, so we can help get your blood pressure under good control.     (Z13.6) CARDIOVASCULAR SCREENING; LDL GOAL LESS THAN 160  Comment: due for recheck  Plan: atorvastatin (LIPITOR) 10 MG tablet, Lipid         panel reflex to direct LDL Fasting        Fasting blood tests today.           Follow-ups after your visit        Who to contact     If you have questions or need follow up information about today's clinic visit or your schedule please contact [facility] directly at [phone].  Normal or non-critical lab and imaging results will be communicated to you by MyChart, letter or phone within 4 business days after the clinic has received the results. If you do not hear from us within 7 days, please contact the clinic through [employer] or phone. If you have a critical or abnormal lab result, we will notify you by phone as soon as possible.  Submit refill requests through [employer] or call your pharmacy and they will forward the refill request to us. Please allow 3 business days for your refill to be completed.          Additional Information About Your Visit        CityIN Information     CityIN lets you send messages to your doctor, view your test results, renew your prescriptions, schedule appointments and more. To sign up, go to [email or web address] . Click on \"Log in\" on the left side of the screen, which will take you to the Welcome page. Then click on \"Sign up Now\" on the right side of the " "page.     You will be asked to enter the access code listed below, as well as some personal information. Please follow the directions to create your username and password.     Your access code is: [email or web address]  Expires: 3/13/2018  9:10 AM     Your access code will  in 90 days. If you need help or a new code, please call your [place] clinic or [phone].        Care EveryWhere ID     This is your Care EveryWhere ID. This could be used by other organizations to access your [place] medical records  [id number]        Your Vitals Were     Pulse Temperature Respirations Height BMI (Body Mass Index)       72 99.2  F (37.3  C) (Tympanic) 12 6' 0.5\" (1.842 m) 29.03 kg/m2        Blood Pressure from Last 3 Encounters:   17 142/90   17 146/90   17 130/86    Weight from Last 3 Encounters:   17 217 lb (98.4 kg)   17 220 lb (99.8 kg)   17 220 lb (99.8 kg)              We Performed the Following     Albumin Random Urine Quantitative with Creat Ratio     Basic metabolic panel     Hemoglobin A1c     Lipid panel reflex to direct LDL Fasting          Today's Medication Changes          These changes are accurate as of: 17  9:10 AM.  If you have any questions, ask your nurse or doctor.               These medicines have changed or have updated prescriptions.        Dose/Directions    * blood glucose lancets standard   Commonly known as:  no brand specified   This may have changed:  Another medication with the same name was added. Make sure you understand how and when to take each.   Used for:  Type 2 diabetes mellitus with diabetic nephropathy, without long-term current use of insulin (H)        Use to test blood sugar 1 times daily or as directed.   Quantity:  1 Box   Refills:  11       * blood glucose lancets standard   Commonly known as:  no brand specified   This may have changed:  You were already taking a medication with the same name, and this prescription was added. Make sure " you understand how and when to take each.   Used for:  Type 2 diabetes mellitus with diabetic nephropathy, without long-term current use of insulin (H)        Use to test blood sugar 1 times daily or as directed.   Quantity:  100 each   Refills:  11       * Notice:  This list has 2 medication(s) that are the same as other medications prescribed for you. Read the directions carefully, and ask your doctor or other care provider to review them with you.         Where to get your medicines      These medications were sent to [facility] - [address] [address]     Phone:  [phone]     atorvastatin 10 MG tablet    blood glucose lancets standard    blood glucose monitoring test strip    glipiZIDE 5 MG 24 hr tablet    metFORMIN 1000 MG tablet                Primary Care Provider Office Phone # Fax #    Omar Parker [phone] [address] [place] [address]        Equal Access to Services     MARIAH REED : Hadii emmett calvillo hadasho Sostasali, waaxda luqadaha, qaybta kaalmada adeegyada, waxay blaire díaz . So [facility] [phone].    ATENCIÓN: Si habla español, tiene a silverio disposición servicios gratuitos de asistencia lingüística. [facility] [phone].    We comply with applicable federal civil rights laws and [place] laws. We do not discriminate on the basis of race, color, national origin, age, disability, sex, sexual orientation, or gender identity.            Thank you!     Thank you for choosing [facility]  for your care. Our goal is always to provide you with excellent care. Hearing back from our patients is one way we can continue to improve our services. Please take a few minutes to complete the written survey that you may receive in the mail after your visit with us. Thank you!             Your Updated Medication List - Protect others around you: Learn how to safely  use, store and throw away your medicines at www.disposemymeds.org.          This list is accurate as of: 12/13/17  9:10 AM.  Always use your most recent med list.                   Brand Name Dispense Instructions for use Diagnosis    ACE/ARB/ARNI NOT PRESCRIBED (INTENTIONAL)      1 each once ACE & ARB not prescribed due to Other: No CKD or hypertension        atorvastatin 10 MG tablet    LIPITOR    90 tablet    Take 1 tablet (10 mg) by mouth daily    CARDIOVASCULAR SCREENING; LDL GOAL LESS THAN 160       * blood glucose lancets standard    no brand specified    1 Box    Use to test blood sugar 1 times daily or as directed.    Type 2 diabetes mellitus with diabetic nephropathy, without long-term current use of insulin (H)       * blood glucose lancets standard    no brand specified    100 each    Use to test blood sugar 1 times daily or as directed.    Type 2 diabetes mellitus with diabetic nephropathy, without long-term current use of insulin (H)       blood glucose monitoring meter device kit    no brand specified    1 kit    Use to test blood sugar 1 times daily or as directed.    Type 2 diabetes mellitus with diabetic nephropathy, without long-term current use of insulin (H)       blood glucose monitoring test strip    no brand specified    100 strip    Use to test blood sugars 1 times daily or as directed    Type 2 diabetes mellitus with diabetic nephropathy, without long-term current use of insulin (H)       calcipotriene 0.005 % Oint     120 g    Apply daily to affected areas on body in the evening.    Other psoriasis       cetirizine 10 MG tablet    zyrTEC    30 tablet    Take 10 mg by mouth daily as needed for allergies (Summer Months (hayfever))        clobetasol 0.05 % ointment    TEMOVATE    120 g    Apply topically daily Apply to Elbows, Legs, and Knees for Psoriasis in the morning.    Other psoriasis       glipiZIDE 5 MG 24 hr tablet    glipiZIDE XL    90 tablet    Take 1 tablet (5 mg) by mouth daily     Type 2 diabetes mellitus with diabetic nephropathy, without long-term current use of insulin (H)       metFORMIN 1000 MG tablet    GLUCOPHAGE    180 tablet    Take 1 tablet (1,000 mg) by mouth 2 times daily (with meals)    Type 2 diabetes mellitus with diabetic nephropathy, without long-term current use of insulin (H)       metroNIDAZOLE 0.75 % cream    METROCREAM     Apply topically daily as needed (face)        * Notice:  This list has 2 medication(s) that are the same as other medications prescribed for you. Read the directions carefully, and ask your doctor or other care provider to review them with you.

## 2017-12-14 ENCOUNTER — TELEPHONE (OUTPATIENT)
Dept: FAMILY MEDICINE | Facility: CLINIC | Age: 47
End: 2017-12-14

## 2017-12-14 DIAGNOSIS — N18.1 CKD (CHRONIC KIDNEY DISEASE) STAGE 1, GFR 90 ML/MIN OR GREATER: Primary | ICD-10-CM

## 2017-12-14 RX ORDER — LISINOPRIL 10 MG/1
10 TABLET ORAL DAILY
Qty: 30 TABLET | Refills: 1 | COMMUNITY
Start: 2017-12-14 | End: 2018-01-12

## 2017-12-14 NOTE — TELEPHONE ENCOUNTER
I recommend adding lisinopril 10mg daily for both blood pressure and protecting kidneys from further damage.  Take one daily and recheck blood pressure and potassium and creatinine in 2 weeks with clinic RN.   Please arrange for prescriptions and orders.  RX #30 with 1 refill.       Patient notified and states understands results and advice. Rx called to pharmacy. Orders in epic.  Luz Maria Hernandez CMA

## 2017-12-14 NOTE — LETTER
[facility]  [address] [place] [address]  [phone]        January 3, 2018    Arvin Ta  [address] [facility] [address]              Dear Arvin Ta    This is to remind you that your Potassium & Creatinine labs are due.    You may call our office at [phone] to schedule an appointment.    Please disregard this notice if you have already had your labs drawn or made an appointment.        Sincerely,        Omar Parker MD

## 2017-12-14 NOTE — PROGRESS NOTES
Please call.  Urine tests show positive MAC (microalbumin/creatinine ratio) meaning protein leaking from the kidneys into the urine. This is a sign of some kidney damage from the diabetes mellitus.  Things that can help prevent further damage to the kidneys include good control of BP, not smoking, good diabetes control and certain medications (ACE and ARB) like lisinopril .  The blood chemistries (Basic metabolic panel) are all normal including electrolytes (salt balances in the blood) and kidney tests with the exception of glucose which is high.    HgbA1C is OK indicating reasonable diabetic control..     He has CKD stage 1- losing small amounts of protein in urine.      PLAN: I recommend adding lisinopril 10mg daily for both blood pressure and protecting kidneys from further damage.  Take one daily and recheck blood pressure and potassium and creatinine in 2 weeks with clinic RN.   Please arrange for prescriptions and orders.  RX #30 with 1 refill.

## 2018-01-12 ENCOUNTER — ALLIED HEALTH/NURSE VISIT (OUTPATIENT)
Dept: FAMILY MEDICINE | Facility: CLINIC | Age: 48
End: 2018-01-12

## 2018-01-12 VITALS — RESPIRATION RATE: 16 BRPM | SYSTOLIC BLOOD PRESSURE: 134 MMHG | DIASTOLIC BLOOD PRESSURE: 78 MMHG | HEART RATE: 76 BPM

## 2018-01-12 DIAGNOSIS — N18.1 CKD (CHRONIC KIDNEY DISEASE) STAGE 1, GFR 90 ML/MIN OR GREATER: Primary | ICD-10-CM

## 2018-01-12 DIAGNOSIS — I10 BENIGN ESSENTIAL HYPERTENSION: ICD-10-CM

## 2018-01-12 DIAGNOSIS — N18.1 CKD (CHRONIC KIDNEY DISEASE) STAGE 1, GFR 90 ML/MIN OR GREATER: ICD-10-CM

## 2018-01-12 PROCEDURE — 84132 ASSAY OF SERUM POTASSIUM: CPT | Performed by: FAMILY MEDICINE

## 2018-01-12 PROCEDURE — 99207 ZZC NO BILLABLE SERVICE THIS VISIT: CPT | Performed by: FAMILY MEDICINE

## 2018-01-12 PROCEDURE — 82565 ASSAY OF CREATININE: CPT | Performed by: FAMILY MEDICINE

## 2018-01-12 PROCEDURE — 36415 COLL VENOUS BLD VENIPUNCTURE: CPT | Performed by: FAMILY MEDICINE

## 2018-01-12 RX ORDER — LISINOPRIL 10 MG/1
10 TABLET ORAL DAILY
Qty: 90 TABLET | Refills: 0 | Status: SHIPPED | OUTPATIENT
Start: 2018-01-12 | End: 2018-04-26

## 2018-01-12 NOTE — NURSING NOTE
Pt here for BP check. BP wnl. Pt states he has been feeling well. Pt also had labs done today. Lisinopril refilled.

## 2018-01-12 NOTE — MR AVS SNAPSHOT
"              After Visit Summary   2018    Arvin Ta    MRN: [id number]           Patient Information     Date Of Birth          1970        Visit Information        Provider Department      2018 3:41 PM Omar Parker MD [facility]        Today's Diagnoses     CKD (chronic kidney disease) stage 1, GFR 90 ml/min or greater    -  1    Benign essential hypertension           Follow-ups after your visit        Who to contact     If you have questions or need follow up information about today's clinic visit or your schedule please contact [facility] directly at [phone].  Normal or non-critical lab and imaging results will be communicated to you by [employer], letter or phone within 4 business days after the clinic has received the results. If you do not hear from us within 7 days, please contact the clinic through [employer] or phone. If you have a critical or abnormal lab result, we will notify you by phone as soon as possible.  Submit refill requests through [employer] or call your pharmacy and they will forward the refill request to us. Please allow 3 business days for your refill to be completed.          Additional Information About Your Visit        MyChart Information     [employer]io lets you send messages to your doctor, view your test results, renew your prescriptions, schedule appointments and more. To sign up, go to [email or web address] . Click on \"Log in\" on the left side of the screen, which will take you to the Welcome page. Then click on \"Sign up Now\" on the right side of the page.     You will be asked to enter the access code listed below, as well as some personal information. Please follow the directions to create your username and password.     Your access code is: [email or web address]  Expires: 3/13/2018  9:10 AM     Your access code will  in 90 days. If you need help or a new code, please call your [facility] or [phone].        Care " EveryWhere ID     This is your Care EveryWhere ID. This could be used by other organizations to access your [facility] medical records  [id number]        Your Vitals Were     Pulse Respirations                76 16           Blood Pressure from Last 3 Encounters:   01/12/18 134/78   12/13/17 142/90   09/06/17 146/90    Weight from Last 3 Encounters:   12/13/17 217 lb (98.4 kg)   09/06/17 220 lb (99.8 kg)   02/24/17 220 lb (99.8 kg)              Today, you had the following     No orders found for display         Where to get your medicines      These medications were sent to [facility] [address] [place] [address]     Phone:  [phone]     lisinopril 10 MG tablet          Primary Care Provider Office Phone # Fax #    Omar Parker [phone] [address] [place] [address]        Equal Access to Services     MARIAH REED : Hadii aad ku hadasho Soomaali, waaxda luqadaha, qaybta kaalmada adeegyada, waxay idiin haymee díaz . So [facility] [phone].    ATENCIÓN: Si habla español, tiene a silverio disposición servicios gratuitos de asistencia lingüística. Llame al [phone].    We comply with applicable federal civil rights laws and [place] laws. We do not discriminate on the basis of race, color, national origin, age, disability, sex, sexual orientation, or gender identity.            Thank you!     Thank you for choosing [facility]  for your care. Our goal is always to provide you with excellent care. Hearing back from our patients is one way we can continue to improve our services. Please take a few minutes to complete the written survey that you may receive in the mail after your visit with us. Thank you!             Your Updated Medication List - Protect others around you: Learn how to safely use, store and throw away your medicines at www.disposemymeds.org.          This list  is accurate as of: 1/12/18  3:54 PM.  Always use your most recent med list.                   Brand Name Dispense Instructions for use Diagnosis    ACE/ARB/ARNI NOT PRESCRIBED (INTENTIONAL)      1 each once ACE & ARB not prescribed due to Other: No CKD or hypertension        atorvastatin 10 MG tablet    LIPITOR    90 tablet    Take 1 tablet (10 mg) by mouth daily    CARDIOVASCULAR SCREENING; LDL GOAL LESS THAN 160       * blood glucose lancets standard    no brand specified    1 Box    Use to test blood sugar 1 times daily or as directed.    Type 2 diabetes mellitus with diabetic nephropathy, without long-term current use of insulin (H)       * blood glucose lancets standard    no brand specified    100 each    Use to test blood sugar 1 times daily or as directed.    Type 2 diabetes mellitus with diabetic nephropathy, without long-term current use of insulin (H)       blood glucose monitoring meter device kit    no brand specified    1 kit    Use to test blood sugar 1 times daily or as directed.    Type 2 diabetes mellitus with diabetic nephropathy, without long-term current use of insulin (H)       blood glucose monitoring test strip    no brand specified    100 strip    Use to test blood sugars 1 times daily or as directed    Type 2 diabetes mellitus with diabetic nephropathy, without long-term current use of insulin (H)       calcipotriene 0.005 % Oint     120 g    Apply daily to affected areas on body in the evening.    Other psoriasis       cetirizine 10 MG tablet    zyrTEC    30 tablet    Take 10 mg by mouth daily as needed for allergies (Summer Months (hayfever))        clobetasol 0.05 % ointment    TEMOVATE    120 g    Apply topically daily Apply to Elbows, Legs, and Knees for Psoriasis in the morning.    Other psoriasis       glipiZIDE 5 MG 24 hr tablet    glipiZIDE XL    90 tablet    Take 1 tablet (5 mg) by mouth daily    Type 2 diabetes mellitus with diabetic nephropathy, without long-term current use of  insulin (H)       lisinopril 10 MG tablet    PRINIVIL/ZESTRIL    90 tablet    Take 1 tablet (10 mg) by mouth daily    CKD (chronic kidney disease) stage 1, GFR 90 ml/min or greater, Benign essential hypertension       metFORMIN 1000 MG tablet    GLUCOPHAGE    180 tablet    Take 1 tablet (1,000 mg) by mouth 2 times daily (with meals)    Type 2 diabetes mellitus with diabetic nephropathy, without long-term current use of insulin (H)       metroNIDAZOLE 0.75 % cream    METROCREAM     Apply topically daily as needed (face)        * Notice:  This list has 2 medication(s) that are the same as other medications prescribed for you. Read the directions carefully, and ask your doctor or other care provider to review them with you.

## 2018-01-13 LAB
CREAT SERPL-MCNC: 0.95 MG/DL (ref 0.66–1.25)
GFR SERPL CREATININE-BSD FRML MDRD: 85 ML/MIN/1.7M2
POTASSIUM SERPL-SCNC: 3.7 MMOL/L (ref 3.4–5.3)

## 2018-01-26 DIAGNOSIS — L40.8 OTHER PSORIASIS: ICD-10-CM

## 2018-01-26 RX ORDER — CLOBETASOL PROPIONATE 0.5 MG/G
OINTMENT TOPICAL DAILY
Qty: 120 G | Refills: 0 | Status: SHIPPED | OUTPATIENT
Start: 2018-01-26 | End: 2019-06-21

## 2018-01-26 NOTE — LETTER
[facility]  [address] [facility] [address]  Phone: [phone]       January 26, 2018         Arvin Ta  [address] [facility] [address]            Dear Arvin:    We are concerned about your health care.  We recently provided you with medication refills.  Many medications require routine follow-up with your doctor.    Your prescription(s) have been refilled one time so you may have time for the above noted follow-up. Please call to schedule soon so we can assure you have an appointment before your next refills are needed.    Thank you,      Waldo Fernández MD/ tr

## 2018-01-26 NOTE — TELEPHONE ENCOUNTER
Medication is being filled for 1 time refill only due to:  Patient needs to be seen because it has been more than one year since last visit. Letter sent to pt to make appt.  Noreen SHI RN BSN PHN  [facility]

## 2018-04-26 DIAGNOSIS — N18.1 CKD (CHRONIC KIDNEY DISEASE) STAGE 1, GFR 90 ML/MIN OR GREATER: ICD-10-CM

## 2018-04-26 DIAGNOSIS — I10 BENIGN ESSENTIAL HYPERTENSION: ICD-10-CM

## 2018-04-26 RX ORDER — LISINOPRIL 10 MG/1
TABLET ORAL
Qty: 90 TABLET | Refills: 0 | Status: SHIPPED | OUTPATIENT
Start: 2018-04-26 | End: 2018-08-29

## 2018-08-16 DIAGNOSIS — E11.21 TYPE 2 DIABETES MELLITUS WITH DIABETIC NEPHROPATHY (H): Primary | ICD-10-CM

## 2018-08-29 DIAGNOSIS — I10 BENIGN ESSENTIAL HYPERTENSION: ICD-10-CM

## 2018-08-29 DIAGNOSIS — N18.1 CKD (CHRONIC KIDNEY DISEASE) STAGE 1, GFR 90 ML/MIN OR GREATER: ICD-10-CM

## 2018-08-29 RX ORDER — LISINOPRIL 10 MG/1
TABLET ORAL
Qty: 90 TABLET | Refills: 1 | Status: SHIPPED | OUTPATIENT
Start: 2018-08-29 | End: 2019-06-12

## 2018-08-29 NOTE — TELEPHONE ENCOUNTER
"Requested Prescriptions   Pending Prescriptions Disp Refills     lisinopril (PRINIVIL/ZESTRIL) 10 MG tablet [Pharmacy Med Name: LISINOPRIL 10MG TABS] 90 tablet 0     Sig: TAKE ONE TABLET BY MOUTH EVERY DAY    ACE Inhibitors (Including Combos) Protocol Passed    8/29/2018  1:49 PM       Passed - Blood pressure under 140/90 in past 12 months    BP Readings from Last 3 Encounters:   01/12/18 134/78   12/13/17 142/90   09/06/17 146/90                Passed - Recent (12 mo) or future (30 days) visit within the authorizing provider's specialty    Patient had office visit in the last 12 months or has a visit in the next 30 days with authorizing provider or within the authorizing provider's specialty.  See \"Patient Info\" tab in inbasket, or \"Choose Columns\" in Meds & Orders section of the refill encounter.           Passed - Patient is age 18 or older       Passed - Normal serum creatinine on file in past 12 months    Recent Labs   Lab Test  01/12/18   1530   CR  0.95            Passed - Normal serum potassium on file in past 12 months    Recent Labs   Lab Test  01/12/18   1530   POTASSIUM  3.7             Last Written Prescription Date:  4/26/18  Last Fill Quantity: 90,  # refills: 0   Last office visit: 12/13/2017 with prescribing provider:     Future Office Visit:      "

## 2018-10-11 ENCOUNTER — OFFICE VISIT (OUTPATIENT)
Dept: URGENT CARE | Facility: URGENT CARE | Age: 48
End: 2018-10-11
Payer: COMMERCIAL

## 2018-10-11 VITALS
WEIGHT: 222.2 LBS | DIASTOLIC BLOOD PRESSURE: 80 MMHG | SYSTOLIC BLOOD PRESSURE: 118 MMHG | HEART RATE: 64 BPM | OXYGEN SATURATION: 98 % | TEMPERATURE: 98.2 F | BODY MASS INDEX: 29.72 KG/M2

## 2018-10-11 DIAGNOSIS — R07.89 CHEST DISCOMFORT: Primary | ICD-10-CM

## 2018-10-11 PROCEDURE — 99214 OFFICE O/P EST MOD 30 MIN: CPT | Performed by: NURSE PRACTITIONER

## 2018-10-11 PROCEDURE — 93000 ELECTROCARDIOGRAM COMPLETE: CPT | Performed by: NURSE PRACTITIONER

## 2018-10-11 NOTE — MR AVS SNAPSHOT
After Visit Summary   10/11/2018    Arvin Ta    MRN: [id number]           Patient Information     Date Of Birth          1970        Visit Information        Provider Department      10/11/2018 7:15 PM Ramandeep Patterson APRN [facility]        Today's Diagnoses     Chest discomfort    -  1      Care Instructions    Start taking Zantac 150 mg morning and bedtime.    Set up appointment to see Dr. Parker within the next week.    If you have any chest discomfort go immediately to the ER for further evaluation.    Follow-up with your primary care provider next week and as needed.    Indications for emergent return to emergency department discussed with patient, who verbalized good understanding and agreement.  Patient understands the limitations of today's evaluation.          *CHEST PAIN, UNCERTAIN CAUSE    Based on your exam today, the exact cause of your chest pain is not certain. Your condition does not seem serious at this time, and your pain does not appear to be coming from your heart. However, sometimes the signs of a serious problem take more time to appear. Therefore, watch for the warning signs listed below.  HOME CARE:    1. Rest today and avoid strenuous activity.  2. Take any prescribed medicine as directed.  FOLLOW UP with your doctor in 1-3 days.   GET PROMPT MEDICAL ATTENTION if any of the following occur:    A change in the type of pain: if it feels different, becomes more severe, lasts longer, or begins to spread into your shoulder, arm, neck, jaw or back    Shortness of breath or increased pain with breathing    Weakness, dizziness, or fainting    Cough with blood or dark colored sputum (phlegm)    Fever over 101  F (38.3  C)    Swelling, pain or redness in one leg    0721-3333 [employer]. [address] [place] [address]. All rights reserved. This information is not intended as a substitute for professional medical  care. Always follow your healthcare professional's instructions.  This information has been modified by your health care provider with permission from the publisher.            Follow-ups after your visit        Follow-up notes from your care team     See patient instructions section of the AVS Return in about 4 days (around 10/15/2018) for Follow up with your primary care provider.      Who to contact     If you have questions or need follow up information about today's clinic visit or your schedule please contact [facility] directly at [phone].  Normal or non-critical lab and imaging results will be communicated to you by MyChart, letter or phone within 4 business days after the clinic has received the results. If you do not hear from us within 7 days, please contact the clinic through MyChart or phone. If you have a critical or abnormal lab result, we will notify you by phone as soon as possible.  Submit refill requests through [employer] or call your pharmacy and they will forward the refill request to us. Please allow 3 business days for your refill to be completed.          Additional Information About Your Visit        Care EveryWhere ID     This is your Care EveryWhere ID. This could be used by other organizations to access your [facility] medical records  [id number]        Your Vitals Were     Pulse Temperature Pulse Oximetry BMI (Body Mass Index)          64 98.2  F (36.8  C) (Tympanic) 98% 29.72 kg/m2         Blood Pressure from Last 3 Encounters:   10/11/18 118/80   01/12/18 134/78   12/13/17 142/90    Weight from Last 3 Encounters:   10/11/18 222 lb 3.2 oz (100.8 kg)   12/13/17 217 lb (98.4 kg)   09/06/17 220 lb (99.8 kg)              We Performed the Following     EKG 12-lead complete w/read - Clinics        Primary Care Provider Office Phone # Fax #    Omar Parker [phone] [address] [place] [address]        Equal Access to  Services     [facility]: Hadii aad ku hadangiedonnell Joslynluis, waaxda luqadaha, qaybta kaalmasakina salas, sharon díaz . So [facility] [phone].    ATENCIÓN: Si habla español, tiene a silverio disposición servicios gratuitos de asistencia lingüística. Llame al [phone].    We comply with applicable federal civil rights laws and [place] laws. We do not discriminate on the basis of race, color, national origin, age, disability, sex, sexual orientation, or gender identity.            Thank you!     Thank you for choosing [facility]  for your care. Our goal is always to provide you with excellent care. Hearing back from our patients is one way we can continue to improve our services. Please take a few minutes to complete the written survey that you may receive in the mail after your visit with us. Thank you!             Your Updated Medication List - Protect others around you: Learn how to safely use, store and throw away your medicines at www.disposemymeds.org.          This list is accurate as of 10/11/18  8:39 PM.  Always use your most recent med list.                   Brand Name Dispense Instructions for use Diagnosis    ACE/ARB/ARNI NOT PRESCRIBED (INTENTIONAL)      1 each once ACE & ARB not prescribed due to Other: No CKD or hypertension        atorvastatin 10 MG tablet    LIPITOR    90 tablet    Take 1 tablet (10 mg) by mouth daily    CARDIOVASCULAR SCREENING; LDL GOAL LESS THAN 160       * blood glucose lancets standard    no brand specified    1 Box    Use to test blood sugar 1 times daily or as directed.    Type 2 diabetes mellitus with diabetic nephropathy, without long-term current use of insulin (H)       * blood glucose lancets standard    no brand specified    100 each    Use to test blood sugar 1 times daily or as directed.    Type 2 diabetes mellitus with diabetic nephropathy, without long-term current use of insulin (H)       blood glucose  monitoring meter device kit    no brand specified    1 kit    Use to test blood sugar 1 times daily or as directed.    Type 2 diabetes mellitus with diabetic nephropathy, without long-term current use of insulin (H)       blood glucose monitoring test strip    no brand specified    100 strip    Use to test blood sugars 1 times daily or as directed    Type 2 diabetes mellitus with diabetic nephropathy, without long-term current use of insulin (H)       calcipotriene 0.005 % Oint     120 g    Apply daily to affected areas on body in the evening.    Other psoriasis       cetirizine 10 MG tablet    zyrTEC    30 tablet    Take 10 mg by mouth daily as needed for allergies (Summer Months (hayfever))        clobetasol 0.05 % ointment    TEMOVATE    120 g    Apply topically daily Apply to Elbows, Legs, and Knees for Psoriasis in the morning.    Other psoriasis       glipiZIDE 5 MG 24 hr tablet    glipiZIDE XL    90 tablet    Take 1 tablet (5 mg) by mouth daily    Type 2 diabetes mellitus with diabetic nephropathy, without long-term current use of insulin (H)       lisinopril 10 MG tablet    PRINIVIL/ZESTRIL    90 tablet    TAKE ONE TABLET BY MOUTH EVERY DAY    CKD (chronic kidney disease) stage 1, GFR 90 ml/min or greater, Benign essential hypertension       metFORMIN 1000 MG tablet    GLUCOPHAGE    180 tablet    Take 1 tablet (1,000 mg) by mouth 2 times daily (with meals)    Type 2 diabetes mellitus with diabetic nephropathy, without long-term current use of insulin (H)       metroNIDAZOLE 0.75 % cream    METROCREAM     Apply topically daily as needed (face)        * Notice:  This list has 2 medication(s) that are the same as other medications prescribed for you. Read the directions carefully, and ask your doctor or other care provider to review them with you.

## 2018-10-12 NOTE — NURSING NOTE
"Chief Complaint   Patient presents with     Abdominal Pain     Has been having some heartburn and is worse today.  Creeped up into chest and feels like it is moving up.         Initial /80 (BP Location: Right arm, Cuff Size: Adult Large)  Pulse 64  Temp 98.2  F (36.8  C) (Tympanic)  Wt 222 lb 3.2 oz (100.8 kg)  SpO2 98%  BMI 29.72 kg/m2 Estimated body mass index is 29.72 kg/(m^2) as calculated from the following:    Height as of 12/13/17: 6' 0.5\" (1.842 m).    Weight as of this encounter: 222 lb 3.2 oz (100.8 kg).    Patient presents to the clinic using No DME    Health Maintenance that is potentially due pending provider review:  NONE    n/a    Is there anyone who you would like to be able to receive your results? Not Applicable  If yes have patient fill out MARINO  Marcella Das M.A.      "

## 2018-10-12 NOTE — PROGRESS NOTES
SUBJECTIVE:   Arvin Ta is a 48 year old male who presents to clinic today for the following health issues:  Chief Complaint   Patient presents with     Abdominal Pain     Has been having some heartburn and is worse today.  Creeped up into chest and feels like it is moving up.           No diaphoresis, nausea or vomiting. No radiation to jaw, back or arm.        Problem list and histories reviewed & adjusted, as indicated.  Additional history: as documented    Patient Active Problem List   Diagnosis     Hypersomnia with sleep apnea     Other psoriasis     Other tear of cartilage or meniscus of knee, current     CARDIOVASCULAR SCREENING; LDL GOAL LESS THAN 160     Type 2 diabetes mellitus with diabetic nephropathy (H)     History of arthroplasty of left knee     CKD (chronic kidney disease) stage 1, GFR 90 ml/min or greater     Past Surgical History:   Procedure Laterality Date     ARTHROPLASTY KNEE UNICOMPARTMENT Left 12/29/2015    Procedure: ARTHROPLASTY KNEE UNICOMPARTMENT;  Surgeon: Lg Meyers MD;  Location: [facility] OR     ORTHOPEDIC SURGERY  2008    Knee     SOFT TISSUE SURGERY  1995    Shoulder     SOFT TISSUE SURGERY  1986    Elbow     SURGICAL HISTORY OF -   1994    (R) Shoulder     SURGICAL HISTORY OF -   1987 approx    Pins in elbow left       Social History   Substance Use Topics     Smoking status: Never Smoker     Smokeless tobacco: Never Used     Alcohol use Yes      Comment: occl     Family History   Problem Relation Age of Onset     Diabetes Mother      type 2     Cancer Father 72     Hodgkins Lymphoma     HEART DISEASE Father      Stent at age 67     Prostate Cancer No family hx of      Colon Cancer No family hx of          Current Outpatient Prescriptions   Medication Sig Dispense Refill     ACE/ARB NOT PRESCRIBED, INTENTIONAL, 1 each once ACE & ARB not prescribed due to Other: No CKD or hypertension       atorvastatin (LIPITOR) 10 MG tablet Take 1 tablet (10 mg) by mouth daily 90 tablet  3     blood glucose (NO BRAND SPECIFIED) lancets standard Use to test blood sugar 1 times daily or as directed. 100 each 11     blood glucose (NO BRAND SPECIFIED) lancets standard Use to test blood sugar 1 times daily or as directed. 1 Box 11     blood glucose monitoring (NO BRAND SPECIFIED) meter device kit Use to test blood sugar 1 times daily or as directed. 1 kit 0     blood glucose monitoring (NO BRAND SPECIFIED) test strip Use to test blood sugars 1 times daily or as directed 100 strip 11     calcipotriene 0.005 % OINT Apply daily to affected areas on body in the evening. 120 g 4     cetirizine (ZYRTEC) 10 MG tablet Take 10 mg by mouth daily as needed for allergies (Summer Months (hayfever))  30 tablet 1     clobetasol (TEMOVATE) 0.05 % ointment Apply topically daily Apply to Elbows, Legs, and Knees for Psoriasis in the morning. 120 g 0     glipiZIDE (GLIPIZIDE XL) 5 MG 24 hr tablet Take 1 tablet (5 mg) by mouth daily 90 tablet 3     lisinopril (PRINIVIL/ZESTRIL) 10 MG tablet TAKE ONE TABLET BY MOUTH EVERY DAY 90 tablet 1     metFORMIN (GLUCOPHAGE) 1000 MG tablet Take 1 tablet (1,000 mg) by mouth 2 times daily (with meals) 180 tablet 3     metroNIDAZOLE (METROCREAM) 0.75 % cream Apply topically daily as needed (face)       No Known Allergies  Labs reviewed in EPIC    Reviewed and updated as needed this visit by clinical staff  Tobacco  Allergies  Meds  Problems  Med Hx  Surg Hx  Fam Hx  Soc Hx        Reviewed and updated as needed this visit by Provider  Allergies  Meds  Problems         ROS:  Constitutional, HEENT, cardiovascular, pulmonary, GI, , musculoskeletal, neuro, skin, endocrine and psych systems are negative, except as otherwise noted.    OBJECTIVE:     /80 (BP Location: Right arm, Cuff Size: Adult Large)  Pulse 64  Temp 98.2  F (36.8  C) (Tympanic)  Wt 222 lb 3.2 oz (100.8 kg)  SpO2 98%  BMI 29.72 kg/m2  Body mass index is 29.72 kg/(m^2).   GENERAL: healthy, alert and no  distress, nontoxic in appearance  EYES: Eyes grossly normal to inspection, PERRL and conjunctivae and sclerae normal  HENT: ear canals and TM's normal, nose and mouth without ulcers or lesions  NECK: no adenopathy, supple with full ROM  RESP: lungs clear to auscultation - no rales, rhonchi or wheezes  CV: regular rate and rhythm, normal S1 S2, no S3 or S4, no murmur, click or rub, no peripheral edema   ABDOMEN: soft, nontender, no hepatosplenomegaly, no masses and bowel sounds normal  MS: no gross musculoskeletal defects noted, no edema  No rash but face is very red with pimples for which he is going to dermatology.    Diagnostic Test Results:  No results found for this or any previous visit (from the past 24 hour(s)).    ASSESSMENT/PLAN:   EKG shows nonspecific T abnormality but they do not look much different from previous EKG. Having no pain at visit. To see Dr. Parker within the week for recheck. If he has any chest pain recurrence he is to go directly to the emergency room. Primarily complains of epigastric discomfort.  Problem List Items Addressed This Visit     None      Visit Diagnoses     Chest discomfort    -  Primary    Relevant Orders    EKG 12-lead complete w/read - Clinics (Completed)               Patient Instructions   Start taking Zantac 150 mg morning and bedtime.    Set up appointment to see Dr. Parker within the next week.    If you have any chest discomfort go immediately to the ER for further evaluation.    Follow-up with your primary care provider next week and as needed.    Indications for emergent return to emergency department discussed with patient, who verbalized good understanding and agreement.  Patient understands the limitations of today's evaluation.          *CHEST PAIN, UNCERTAIN CAUSE    Based on your exam today, the exact cause of your chest pain is not certain. Your condition does not seem serious at this time, and your pain does not appear to be coming from your heart. However,  sometimes the signs of a serious problem take more time to appear. Therefore, watch for the warning signs listed below.  HOME CARE:    1. Rest today and avoid strenuous activity.  2. Take any prescribed medicine as directed.  FOLLOW UP with your doctor in 1-3 days.   GET PROMPT MEDICAL ATTENTION if any of the following occur:    A change in the type of pain: if it feels different, becomes more severe, lasts longer, or begins to spread into your shoulder, arm, neck, jaw or back    Shortness of breath or increased pain with breathing    Weakness, dizziness, or fainting    Cough with blood or dark colored sputum (phlegm)    Fever over 101  F (38.3  C)    Swelling, pain or redness in one leg    3504-6448 [employer]. [address] [place] [address], [place] [address]. All rights reserved. This information is not intended as a substitute for professional medical care. Always follow your healthcare professional's instructions.  This information has been modified by your health care provider with permission from the publisher.        [facility]

## 2018-10-12 NOTE — PATIENT INSTRUCTIONS
Start taking Zantac 150 mg morning and bedtime.    Set up appointment to see Dr. Parker within the next week.    If you have any chest discomfort go immediately to the ER for further evaluation.    Follow-up with your primary care provider next week and as needed.    Indications for emergent return to emergency department discussed with patient, who verbalized good understanding and agreement.  Patient understands the limitations of today's evaluation.          *CHEST PAIN, UNCERTAIN CAUSE    Based on your exam today, the exact cause of your chest pain is not certain. Your condition does not seem serious at this time, and your pain does not appear to be coming from your heart. However, sometimes the signs of a serious problem take more time to appear. Therefore, watch for the warning signs listed below.  HOME CARE:    1. Rest today and avoid strenuous activity.  2. Take any prescribed medicine as directed.  FOLLOW UP with your doctor in 1-3 days.   GET PROMPT MEDICAL ATTENTION if any of the following occur:    A change in the type of pain: if it feels different, becomes more severe, lasts longer, or begins to spread into your shoulder, arm, neck, jaw or back    Shortness of breath or increased pain with breathing    Weakness, dizziness, or fainting    Cough with blood or dark colored sputum (phlegm)    Fever over 101  F (38.3  C)    Swelling, pain or redness in one leg    9921-8620 [employer]. [address] [place] [address], [place] [address]. All rights reserved. This information is not intended as a substitute for professional medical care. Always follow your healthcare professional's instructions.  This information has been modified by your health care provider with permission from the publisher.

## 2019-06-02 DIAGNOSIS — Z13.6 CARDIOVASCULAR SCREENING; LDL GOAL LESS THAN 160: ICD-10-CM

## 2019-06-02 DIAGNOSIS — E11.21 TYPE 2 DIABETES MELLITUS WITH DIABETIC NEPHROPATHY, WITHOUT LONG-TERM CURRENT USE OF INSULIN (H): ICD-10-CM

## 2019-06-03 RX ORDER — GLIPIZIDE 5 MG/1
TABLET, FILM COATED, EXTENDED RELEASE ORAL
Qty: 90 TABLET | Refills: 0 | OUTPATIENT
Start: 2019-06-03

## 2019-06-03 RX ORDER — ATORVASTATIN CALCIUM 10 MG/1
TABLET, FILM COATED ORAL
Qty: 90 TABLET | Refills: 0 | OUTPATIENT
Start: 2019-06-03

## 2019-06-03 NOTE — TELEPHONE ENCOUNTER
I talked with Vic and he says that he does not need these meds, he has enough.  I scheduled an appointment for him to be seen  Marva Amos RN

## 2019-06-03 NOTE — TELEPHONE ENCOUNTER
"Requested Prescriptions   Pending Prescriptions Disp Refills     atorvastatin (LIPITOR) 10 MG tablet [Pharmacy Med Name: ATORVASTATIN TABS 10MG] 90 tablet 0     Sig: TAKE 1 TABLET DAILY       Statins Protocol Failed - 6/2/2019  4:50 AM        Failed - LDL on file in past 12 months     Recent Labs   Lab Test 12/13/17 0820   LDL 69             Failed - Recent (12 mo) or future (30 days) visit within the authorizing provider's specialty     Patient had office visit in the last 12 months or has a visit in the next 30 days with authorizing provider or within the authorizing provider's specialty.  See \"Patient Info\" tab in inbasket, or \"Choose Columns\" in Meds & Orders section of the refill encounter.      Last Written Prescription Date:  3/4/19  Last Fill Quantity: 90,  # refills: 0   Last office visit: 12/13/2017 with prescribing provider:     Future Office Visit:                Passed - No abnormal creatine kinase in past 12 months     No lab results found.             Passed - Medication is active on med list        Passed - Patient is age 18 or older        glipiZIDE (GLUCOTROL XL) 5 MG 24 hr tablet [Pharmacy Med Name: GLIPIZIDE ER TABS 5MG] 90 tablet 0     Sig: TAKE 1 TABLET DAILY       Sulfonylurea Agents Failed - 6/2/2019  4:50 AM        Failed - Blood pressure less than 140/90 in past 6 months     BP Readings from Last 3 Encounters:   10/11/18 118/80   01/12/18 134/78   12/13/17 142/90                 Failed - Patient has documented LDL within the past 12 mos.     Recent Labs   Lab Test 12/13/17 0820   LDL 69             Failed - Patient has had a Microalbumin in the past 15 mos.     Recent Labs   Lab Test 12/13/17 0918   MICROL 38   UMALCR 24.61*             Failed - Patient has documented A1c within the specified period of time.     If HgbA1C is 8 or greater, it needs to be on file within the past 3 months.  If less than 8, must be on file within the past 6 months.     Recent Labs   Lab Test 12/13/17 0820 " "  A1C 6.3*             Failed - Patient has a recent creatinine (normal) within the past 12 mos.     Recent Labs   Lab Test 01/12/18  1530   CR 0.95             Failed - Recent (6 mo) or future (30 days) visit within the authorizing provider's specialty     Patient had office visit in the last 6 months or has a visit in the next 30 days with authorizing provider or within the authorizing provider's specialty.  See \"Patient Info\" tab in inbasket, or \"Choose Columns\" in Meds & Orders section of the refill encounter.      Last Written Prescription Date:  3/4/19  Last Fill Quantity: 90,  # refills: 0   Last office visit: 12/13/2017 with prescribing provider:     Future Office Visit:              Passed - Medication is active on med list        Passed - Patient is age 18 or older        metFORMIN (GLUCOPHAGE) 1000 MG tablet [Pharmacy Med Name: METFORMIN HCL TABS 1000MG] 180 tablet 0     Sig: TAKE 1 TABLET TWICE A DAY WITH MEALS       Biguanide Agents Failed - 6/2/2019  4:50 AM        Failed - Blood pressure less than 140/90 in past 6 months     BP Readings from Last 3 Encounters:   10/11/18 118/80   01/12/18 134/78   12/13/17 142/90                 Failed - Patient has documented LDL within the past 12 mos.     Recent Labs   Lab Test 12/13/17  0820   LDL 69             Failed - Patient has had a Microalbumin in the past 15 mos.     Recent Labs   Lab Test 12/13/17  0918   MICROL 38   UMALCR 24.61*             Failed - Patient has documented A1c within the specified period of time.     If HgbA1C is 8 or greater, it needs to be on file within the past 3 months.  If less than 8, must be on file within the past 6 months.     Recent Labs   Lab Test 12/13/17  0820   A1C 6.3*             Failed - Patient's CR is NOT>1.4 OR Patient's EGFR is NOT<45 within past 12 mos.     Recent Labs   Lab Test 01/12/18  1530   GFRESTIMATED 85   GFRESTBLACK >90       Recent Labs   Lab Test 01/12/18  1530   CR 0.95             Failed - Recent (6 " "mo) or future (30 days) visit within the authorizing provider's specialty     Patient had office visit in the last 6 months or has a visit in the next 30 days with authorizing provider or within the authorizing provider's specialty.  See \"Patient Info\" tab in inbasket, or \"Choose Columns\" in Meds & Orders section of the refill encounter.            Passed - Patient is age 10 or older        Passed - Patient does NOT have a diagnosis of CHF.        Passed - Medication is active on med list          "

## 2019-06-12 ENCOUNTER — OFFICE VISIT (OUTPATIENT)
Dept: FAMILY MEDICINE | Facility: CLINIC | Age: 49
End: 2019-06-12
Payer: COMMERCIAL

## 2019-06-12 VITALS
HEIGHT: 72 IN | RESPIRATION RATE: 16 BRPM | DIASTOLIC BLOOD PRESSURE: 86 MMHG | HEART RATE: 66 BPM | BODY MASS INDEX: 29.8 KG/M2 | TEMPERATURE: 98.6 F | WEIGHT: 220 LBS | SYSTOLIC BLOOD PRESSURE: 130 MMHG

## 2019-06-12 DIAGNOSIS — E11.21 TYPE 2 DIABETES MELLITUS WITH DIABETIC NEPHROPATHY, WITHOUT LONG-TERM CURRENT USE OF INSULIN (H): Primary | ICD-10-CM

## 2019-06-12 DIAGNOSIS — Z13.6 CARDIOVASCULAR SCREENING; LDL GOAL LESS THAN 160: ICD-10-CM

## 2019-06-12 DIAGNOSIS — L40.8 OTHER PSORIASIS: ICD-10-CM

## 2019-06-12 DIAGNOSIS — N18.1 CKD (CHRONIC KIDNEY DISEASE) STAGE 1, GFR 90 ML/MIN OR GREATER: ICD-10-CM

## 2019-06-12 DIAGNOSIS — I10 HTN (HYPERTENSION): ICD-10-CM

## 2019-06-12 DIAGNOSIS — I10 BENIGN ESSENTIAL HYPERTENSION: ICD-10-CM

## 2019-06-12 LAB
ANION GAP SERPL CALCULATED.3IONS-SCNC: 4 MMOL/L (ref 3–14)
BUN SERPL-MCNC: 11 MG/DL (ref 7–30)
CALCIUM SERPL-MCNC: 9.3 MG/DL (ref 8.5–10.1)
CHLORIDE SERPL-SCNC: 102 MMOL/L (ref 94–109)
CHOLEST SERPL-MCNC: 135 MG/DL
CO2 SERPL-SCNC: 26 MMOL/L (ref 20–32)
CREAT SERPL-MCNC: 0.93 MG/DL (ref 0.66–1.25)
CREAT UR-MCNC: 32 MG/DL
GFR SERPL CREATININE-BSD FRML MDRD: >90 ML/MIN/{1.73_M2}
GLUCOSE SERPL-MCNC: 135 MG/DL (ref 70–99)
HBA1C MFR BLD: 7.6 % (ref 0–5.6)
HDLC SERPL-MCNC: 37 MG/DL
LDLC SERPL CALC-MCNC: 52 MG/DL
MICROALBUMIN UR-MCNC: <5 MG/L
MICROALBUMIN/CREAT UR: NORMAL MG/G CR (ref 0–17)
NONHDLC SERPL-MCNC: 98 MG/DL
POTASSIUM SERPL-SCNC: 4.2 MMOL/L (ref 3.4–5.3)
SODIUM SERPL-SCNC: 132 MMOL/L (ref 133–144)
TRIGL SERPL-MCNC: 228 MG/DL
TSH SERPL DL<=0.005 MIU/L-ACNC: 2.2 MU/L (ref 0.4–4)

## 2019-06-12 PROCEDURE — 80048 BASIC METABOLIC PNL TOTAL CA: CPT | Performed by: FAMILY MEDICINE

## 2019-06-12 PROCEDURE — 82043 UR ALBUMIN QUANTITATIVE: CPT | Performed by: FAMILY MEDICINE

## 2019-06-12 PROCEDURE — 84443 ASSAY THYROID STIM HORMONE: CPT | Performed by: FAMILY MEDICINE

## 2019-06-12 PROCEDURE — 36415 COLL VENOUS BLD VENIPUNCTURE: CPT | Performed by: FAMILY MEDICINE

## 2019-06-12 PROCEDURE — 99214 OFFICE O/P EST MOD 30 MIN: CPT | Performed by: FAMILY MEDICINE

## 2019-06-12 PROCEDURE — 80061 LIPID PANEL: CPT | Performed by: FAMILY MEDICINE

## 2019-06-12 PROCEDURE — 83036 HEMOGLOBIN GLYCOSYLATED A1C: CPT | Performed by: FAMILY MEDICINE

## 2019-06-12 PROCEDURE — 99207 C FOOT EXAM  NO CHARGE: CPT | Performed by: FAMILY MEDICINE

## 2019-06-12 RX ORDER — GLIPIZIDE 10 MG/1
10 TABLET, FILM COATED, EXTENDED RELEASE ORAL DAILY
Qty: 90 TABLET | Refills: 3 | Status: SHIPPED | OUTPATIENT
Start: 2019-06-12 | End: 2020-11-06

## 2019-06-12 RX ORDER — ATORVASTATIN CALCIUM 10 MG/1
10 TABLET, FILM COATED ORAL DAILY
Qty: 90 TABLET | Refills: 3 | Status: SHIPPED | OUTPATIENT
Start: 2019-06-12 | End: 2020-06-09

## 2019-06-12 RX ORDER — LISINOPRIL 10 MG/1
10 TABLET ORAL DAILY
Qty: 90 TABLET | Refills: 3 | Status: SHIPPED | OUTPATIENT
Start: 2019-06-12 | End: 2019-10-28

## 2019-06-12 ASSESSMENT — MIFFLIN-ST. JEOR: SCORE: 1905.91

## 2019-06-12 NOTE — NURSING NOTE
Chief Complaint   Patient presents with     Diabetes       Initial /86   Pulse 66   Temp 98.6  F (37  C) (Tympanic)   Resp 16   Ht 1.829 m (6')   Wt 99.8 kg (220 lb)   BMI 29.84 kg/m   Estimated body mass index is 29.84 kg/m  as calculated from the following:    Height as of this encounter: 1.829 m (6').    Weight as of this encounter: 99.8 kg (220 lb).    Patient presents to the clinic using No DME    Health Maintenance that is potentially due pending provider review:  NONE    n/a    Is there anyone who you would like to be able to receive your results?   If yes have patient fill out MARINO

## 2019-06-12 NOTE — PROGRESS NOTES
SUBJECTIVE: Arvin Ta is a 49 year old male  Chief Complaint   Patient presents with     Diabetes     Medication Reconciliation     Off lisinopril for past month.      Needs Dermatology referral.       Diabetes Follow-up      How often are you checking your blood sugar? Two times daily.  Glucometer range within the past  Month=102-212    Taking metformin 1000mg twice daily.     Taking glipizide 5mg daily.   Lab Results   Component Value Date    A1C 7.6 06/12/2019     He feels Hgb A1C high due to not checking glucometers and not paying attention to diet.     What time of day are you checking your blood sugars (select all that apply)?  Before meals and After meals    Have you had any blood sugars above 200?  No    Have you had any blood sugars below 70?  No    What symptoms do you notice when your blood sugar is low?  None    What concerns do you have today about your diabetes? Other: ???Kidney random pain in left flank pain     Do you have any of these symptoms? (Select all that apply)  No numbness or tingling in feet.  No redness, sores or blisters on feet.  No complaints of excessive thirst.  No reports of blurry vision.  No significant changes to weight.     Have you had a diabetic eye exam in the last 12 months? Yes- Date of last eye exam: 12/15/2018  Exercise:no.    Diabetes Management Resources    Hyperlipidemia Follow-Up      Are you having any of the following symptoms? (Select all that apply)  No complaints of shortness of breath, chest pain or pressure.  No increased sweating or nausea with activity.  No left-sided neck or arm pain.  No complaints of pain in calves when walking 1-2 blocks.    Are you regularly taking any medication or supplement to lower your cholesterol?   Yes- taking    Are you having muscle aches or other side effects that you think could be caused by your cholesterol lowering medication?  No    Hypertension Follow-up      Do you check your blood pressure regularly outside of the  clinic? Yes     Are you following a low salt diet? Yes    Are your blood pressures ever more than 140 on the top number (systolic) OR more   than 90 on the bottom number (diastolic), for example 140/90? No    BP Readings from Last 2 Encounters:   06/12/19 130/86   10/11/18 118/80     Hemoglobin A1C (%)   Date Value   06/12/2019 7.6 (H)   12/13/2017 6.3 (H)     LDL Cholesterol Calculated (mg/dL)   Date Value   12/13/2017 69   02/24/2017 66       Amount of exercise or physical activity: 1 daily    Problems taking medications regularly: No    Medication side effects: none    Last clinic visit:12/13/2017  Medication or other changes at last visit:added lisinopril for renal protection.   Weight since last visit?   Wt Readings from Last 5 Encounters:   06/12/19 99.8 kg (220 lb)   10/11/18 100.8 kg (222 lb 3.2 oz)   12/13/17 98.4 kg (217 lb)   09/06/17 99.8 kg (220 lb)   02/24/17 99.8 kg (220 lb)      History   Smoking Status     Never Smoker   Smokeless Tobacco     Never Used       Past medical history reviewed and updated    Patient Active Problem List    Diagnosis Date Noted     CKD (chronic kidney disease) stage 1, GFR 90 ml/min or greater 12/14/2017     Priority: Medium     History of arthroplasty of left knee 12/29/2015     Priority: Medium     Type 2 diabetes mellitus with diabetic nephropathy (H) 06/22/2015     Priority: Medium     Diagnosis in June, 2013.        CARDIOVASCULAR SCREENING; LDL GOAL LESS THAN 160 10/31/2010     Priority: Medium     Hypersomnia with sleep apnea 08/29/2006     Priority: Medium     On CPAP January 2005.  6/24/2015:Uses CPAP every night.          Other psoriasis 08/29/2006     Priority: Medium     Other tear of cartilage or meniscus of knee, current 08/29/2006     Priority: Medium     posterior medial on left       Current Outpatient Medications   Medication Sig Dispense Refill     ACE/ARB NOT PRESCRIBED, INTENTIONAL, 1 each once ACE & ARB not prescribed due to Other: No CKD or  hypertension       atorvastatin (LIPITOR) 10 MG tablet TAKE 1 TABLET DAILY 90 tablet 0     cetirizine (ZYRTEC) 10 MG tablet Take 10 mg by mouth daily as needed for allergies (Summer Months (hayfever))  30 tablet 1     glipiZIDE (GLUCOTROL XL) 5 MG 24 hr tablet TAKE 1 TABLET DAILY 90 tablet 0     lisinopril (PRINIVIL/ZESTRIL) 10 MG tablet TAKE ONE TABLET BY MOUTH EVERY DAY 90 tablet 1     metFORMIN (GLUCOPHAGE) 1000 MG tablet TAKE 1 TABLET TWICE A DAY WITH MEALS 180 tablet 0     blood glucose (NO BRAND SPECIFIED) lancets standard Use to test blood sugar 1 times daily or as directed. 100 each 11     blood glucose (NO BRAND SPECIFIED) lancets standard Use to test blood sugar 1 times daily or as directed. 1 Box 11     blood glucose monitoring (NO BRAND SPECIFIED) meter device kit Use to test blood sugar 1 times daily or as directed. 1 kit 0     blood glucose monitoring (NO BRAND SPECIFIED) test strip Use to test blood sugars 1 times daily or as directed 100 strip 11     calcipotriene 0.005 % OINT Apply daily to affected areas on body in the evening. 120 g 4     clobetasol (TEMOVATE) 0.05 % ointment Apply topically daily Apply to Elbows, Legs, and Knees for Psoriasis in the morning. 120 g 0     metroNIDAZOLE (METROCREAM) 0.75 % cream Apply topically daily as needed (face)       ROS:General: No change in weight, sleep or appetite.  Normal energy.  No fever or chills  Resp: No coughing, wheezing or shortness of breath  CV: No chest pains or palpitations  GI: No nausea, vomiting,  heartburn, abdominal pain, diarrhea, constipation or change in bowel habits  : No urinary frequency or dysuria, bladder or kidney problems  Musculoskeletal: No significant muscle or joint pains  Psychiatric: No problems with anxiety, depression or mental health  bP at home has been 130    OBJECTIVE:Blood pressure 130/86, pulse 66, temperature 98.6  F (37  C), temperature source Tympanic, resp. rate 16, height 1.829 m (6'), weight 99.8 kg (220  lb). BMI=Body mass index is 29.84 kg/m .  GENERAL APPEARANCE ADULT: Alert, no acute distress  NECK: No adenopathy,masses or thyromegaly  RESP: lungs clear to auscultation   CV: normal rate, regular rhythm, no murmur or gallop  ABDOMEN: soft, no organomegaly, masses or tenderness  MS: extremities normal, no peripheral edema   Foot exam:normal DP and PT pulses, no trophic changes or ulcerative lesions and normal monofilament exam    ASSESSMENT/PLAN:                                                    Lifestyle recommendations:regular exercise, at least 150 minutes per week (average 30 minutes 5 times a week)  weight loss recommended (ideal BMI-body mass index is <25)  Diabetic recommendations:-Attempt to improve diet  eye exam is needed yearly  return for follow-up visit in 6 month(s)    (E11.21) Type 2 diabetes mellitus with diabetic nephropathy, without long-term current use of insulin (H)  (primary encounter diagnosis)  Comment: OK but room for improvement.   Plan: TSH with free T4 reflex, Hemoglobin A1c,         Albumin Random Urine Quantitative with Creat         Ratio, Basic metabolic panel, C FOOT EXAM  NO         CHARGE, glipiZIDE (GLUCOTROL XL) 10 MG 24 hr         tablet, metFORMIN (GLUCOPHAGE) 1000 MG tablet          Increase glipizide to 10mg daily.    Continue the metformin at 1000mg twice daily     (Z13.6) CARDIOVASCULAR SCREENING; LDL GOAL LESS THAN 160  Comment:   Plan: Lipid panel reflex to direct LDL Fasting,         atorvastatin (LIPITOR) 10 MG tablet        Blood tests today.  Non-fasting.     (N18.1) CKD (chronic kidney disease) stage 1, GFR 90 ml/min or greater  Comment:   Plan: lisinopril (PRINIVIL/ZESTRIL) 10 MG tablet        REfills.     (I10) Benign essential hypertension  Comment: doing OK  Plan: lisinopril (PRINIVIL/ZESTRIL) 10 MG tablet        No change in current treatment plan.  Refills.     (L40.8) Other psoriasis  Comment:   Plan: DERMATOLOGY REFERRAL        Schedule an appointment with  Dermatology        Diabetes Type II, A1c Controlled, non-insulin dependent   Associated with the following complications    Renal Complications:  CKD

## 2019-06-12 NOTE — PATIENT INSTRUCTIONS
ASSESSMENT/PLAN:                                                    Lifestyle recommendations:regular exercise, at least 150 minutes per week (average 30 minutes 5 times a week)  weight loss recommended (ideal BMI-body mass index is <25)  Diabetic recommendations:-Attempt to improve diet  eye exam is needed yearly  return for follow-up visit in 6 month(s)    (E11.21) Type 2 diabetes mellitus with diabetic nephropathy, without long-term current use of insulin (H)  (primary encounter diagnosis)  Comment: OK but room for improvement.   Plan: TSH with free T4 reflex, Hemoglobin A1c,         Albumin Random Urine Quantitative with Creat         Ratio, Basic metabolic panel, C FOOT EXAM  NO         CHARGE, glipiZIDE (GLUCOTROL XL) 10 MG 24 hr         tablet, metFORMIN (GLUCOPHAGE) 1000 MG tablet          Increase glipizide to 10mg daily.    Continue the metformin at 1000mg twice daily     (Z13.6) CARDIOVASCULAR SCREENING; LDL GOAL LESS THAN 160  Comment:   Plan: Lipid panel reflex to direct LDL Fasting,         atorvastatin (LIPITOR) 10 MG tablet        Blood tests today.  Non-fasting.     (N18.1) CKD (chronic kidney disease) stage 1, GFR 90 ml/min or greater  Comment:   Plan: lisinopril (PRINIVIL/ZESTRIL) 10 MG tablet        REfills.     (I10) Benign essential hypertension  Comment: doing OK  Plan: lisinopril (PRINIVIL/ZESTRIL) 10 MG tablet        No change in current treatment plan.  Refills.     (L40.8) Other psoriasis  Comment:   Plan: DERMATOLOGY REFERRAL        Schedule an appointment with Dermatology

## 2019-06-12 NOTE — LETTER
"[facility]  [address] [place] [id number]  Phone: [phone]  Fax: [phone]    June 18, 2019        Arvin Ta  [address] [facility] [address]          Dear Arvin,      Microalbumin urine test is normal indicating no increased amount of protein in urine. This is a measure of kidney function.    TSH is normal indicating that the level of thyroid hormone is in the normal range.   HDL (\"good cholesterol\") is low.  .  Triglycerides, a type of fat found in the bloodstream is high.  LDL is good.   Hgb A1C higher as we discussed.   Sodium a little low.  Glucose increased.   PLAN: Increase glipizide as planned.    Eat foods higher in potassium.    See list.   High potassium content foods    Highest content (>25 meq/100 g)  Dried figs  Molasses  Seaweed    Very high content (>12.5 meq/100 g)  Dried fruits (dates, prunes)  Nuts  Avocados  Bran cereals  Wheat germ  Lima beans    High content (>6.2 meq/100 g)  Vegetables  Spinach  Tomatoes  Broccoli  Winter squash  Beets  Carrots  Cauliflower  Potatoes  Fruits  Bananas  Cantaloupe  Kiwis  Oranges  Mangos  Meats  Ground beef  Steak  Pork  Veal  Ashley  Adapted from: Norma MITTAL. Hypokalemia. N Engl J Med 1998; 339:451.    Results for orders placed or performed in visit on 06/12/19   Eye Exam - HIM Scan    Impression    Normal exam per patient recheck in 12 months.   Lipid panel reflex to direct LDL Fasting   Result Value Ref Range    Cholesterol 135 <200 mg/dL    Triglycerides 228 (H) <150 mg/dL    HDL Cholesterol 37 (L) >39 mg/dL    LDL Cholesterol Calculated 52 <100 mg/dL    Non HDL Cholesterol 98 <130 mg/dL   TSH with free T4 reflex   Result Value Ref Range    TSH 2.20 0.40 - 4.00 mU/L   Hemoglobin A1c   Result Value Ref Range    Hemoglobin A1C 7.6 (H) 0 - 5.6 %   Albumin Random Urine Quantitative with Creat Ratio   Result Value Ref Range    Creatinine Urine 32 mg/dL    Albumin Urine mg/L <5 mg/L    Albumin Urine " mg/g Cr Unable to calculate due to low value 0 - 17 mg/g Cr   Basic metabolic panel   Result Value Ref Range    Sodium 132 (L) 133 - 144 mmol/L    Potassium 4.2 3.4 - 5.3 mmol/L    Chloride 102 94 - 109 mmol/L    Carbon Dioxide 26 20 - 32 mmol/L    Anion Gap 4 3 - 14 mmol/L    Glucose 135 (H) 70 - 99 mg/dL    Urea Nitrogen 11 7 - 30 mg/dL    Creatinine 0.93 0.66 - 1.25 mg/dL    GFR Estimate >90 >60 mL/min/[1.73_m2]    GFR Estimate If Black >90 >60 mL/min/[1.73_m2]    Calcium 9.3 8.5 - 10.1 mg/dL           Sincerely,        Omar Parker MD/ sb

## 2019-06-18 NOTE — RESULT ENCOUNTER NOTE
"Please call.  Microalbumin urine test is normal indicating no increased amount of protein in urine. This is a measure of kidney function.    TSH is normal indicating that the level of thyroid hormone is in the normal range.   HDL (\"good cholesterol\") is low.  .  Triglycerides, a type of fat found in the bloodstream is high.  LDL is good.   Hgb A1C higher as we discussed.   Sodium a little low.  Glucose increased.   PLAN: Increase glipizide as planned.    Eat foods higher in potassium.    See list.      2015 UpToDate     High potassium content foods    Highest content (>25 meq/100 g)  Dried figs  Molasses  Seaweed    Very high content (>12.5 meq/100 g)  Dried fruits (dates, prunes)  Nuts  Avocados  Bran cereals  Wheat germ  Lima beans    High content (>6.2 meq/100 g)  Vegetables  Spinach  Tomatoes  Broccoli  Winter squash  Beets  Carrots  Cauliflower  Potatoes  Fruits  Bananas  Cantaloupe  Kiwis  Oranges  Mangos  Meats  Ground beef  Steak  Pork  Veal  Ashley  Adapted from: Norma MITTAL. Hypokalemia. N Engl J Med 1998; 339:451."

## 2019-06-21 ENCOUNTER — OFFICE VISIT (OUTPATIENT)
Dept: DERMATOLOGY | Facility: CLINIC | Age: 49
End: 2019-06-21
Payer: COMMERCIAL

## 2019-06-21 VITALS — HEART RATE: 72 BPM | SYSTOLIC BLOOD PRESSURE: 136 MMHG | DIASTOLIC BLOOD PRESSURE: 80 MMHG | OXYGEN SATURATION: 97 %

## 2019-06-21 DIAGNOSIS — L71.9 ACNE ROSACEA: Primary | ICD-10-CM

## 2019-06-21 DIAGNOSIS — L40.8 OTHER PSORIASIS: ICD-10-CM

## 2019-06-21 PROCEDURE — 99213 OFFICE O/P EST LOW 20 MIN: CPT | Performed by: PHYSICIAN ASSISTANT

## 2019-06-21 RX ORDER — CLOBETASOL PROPIONATE 0.5 MG/G
OINTMENT TOPICAL DAILY
Qty: 120 G | Refills: 4 | Status: SHIPPED | OUTPATIENT
Start: 2019-06-21 | End: 2020-04-27

## 2019-06-21 RX ORDER — CALCIPOTRIENE 50 UG/G
OINTMENT TOPICAL
Qty: 120 G | Refills: 4 | Status: SHIPPED | OUTPATIENT
Start: 2019-06-21 | End: 2020-04-27

## 2019-06-21 RX ORDER — DOXYCYCLINE 100 MG/1
100 CAPSULE ORAL 2 TIMES DAILY WITH MEALS
Qty: 180 CAPSULE | Refills: 0 | Status: SHIPPED | OUTPATIENT
Start: 2019-06-21 | End: 2020-11-23

## 2019-06-21 NOTE — LETTER
6/21/2019         RE: Arvin Ta  [address] [facility] [address]        Dear Colleague,    Thank you for referring your patient, Arvin Ta, to the [facility]. Please see a copy of my visit note below.    Arvin Ta is a 49 year old year old male patient here today for recheck psoriasis and acne rosacea. Patient reports that he has been out of his cream for a year. He notes that his acne rosacea has flared on his face. He would like refills on dovonex and clobetasol for his psoriasis. He reports that they work well and he is not interested in systemic medication.  Patient has no other skin complaints today.  Remainder of the HPI, Meds, PMH, Allergies, FH, and SH was reviewed in chart.    Pertinent Hx:   Acne Rosacea and Psoriasis   Past Medical History:   Diagnosis Date     Other psoriasis      Sleep apnea      Type 2 diabetes mellitus without complications (H)     Diabetes mellitus       Past Surgical History:   Procedure Laterality Date     ARTHROPLASTY KNEE UNICOMPARTMENT Left 12/29/2015    Procedure: ARTHROPLASTY KNEE UNICOMPARTMENT;  Surgeon: Lg Meyers MD;  Location: [facility] OR     ORTHOPEDIC SURGERY  2008    Knee     SOFT TISSUE SURGERY  1995    Shoulder     SOFT TISSUE SURGERY  1986    Elbow     SURGICAL HISTORY OF -   1994    (R) Shoulder     SURGICAL HISTORY OF -   1987 approx    Pins in elbow left        Family History   Problem Relation Age of Onset     Diabetes Mother         type 2     Cancer Father 72        Hodgkins Lymphoma     Heart Disease Father         Stent at age 67     Prostate Cancer No family hx of      Colon Cancer No family hx of        Social History     Socioeconomic History     Marital status:      Spouse name: Ramila     Number of children: Not on file     Years of education: 16     Highest education level: Not on file   Occupational History     Employer: [employer]   Social Needs     Financial resource strain:  Not on file     Food insecurity:     Worry: Not on file     Inability: Not on file     Transportation needs:     Medical: Not on file     Non-medical: Not on file   Tobacco Use     Smoking status: Never Smoker     Smokeless tobacco: Never Used   Substance and Sexual Activity     Alcohol use: Yes     Comment: occl     Drug use: No     Sexual activity: Yes     Partners: Female   Lifestyle     Physical activity:     Days per week: Not on file     Minutes per session: Not on file     Stress: Not on file   Relationships     Social connections:     Talks on phone: Not on file     Gets together: Not on file     Attends [employer] service: Not on file     Active member of club or organization: Not on file     Attends meetings of clubs or organizations: Not on file     Relationship status: Not on file     Intimate partner violence:     Fear of current or ex partner: Not on file     Emotionally abused: Not on file     Physically abused: Not on file     Forced sexual activity: Not on file   Other Topics Concern      Service No     Blood Transfusions No     Caffeine Concern No     Occupational Exposure No     Hobby Hazards No     Sleep Concern Yes     Comment: c pap- sleep apnea     Stress Concern No     Weight Concern No     Special Diet No     Back Care No     Exercise No     Bike Helmet No     Seat Belt Yes     Self-Exams No     Parent/sibling w/ CABG, MI or angioplasty before 65F 55M? Not Asked   Social History Narrative     Not on file       Outpatient Encounter Medications as of 6/21/2019   Medication Sig Dispense Refill     ACE/ARB NOT PRESCRIBED, INTENTIONAL, 1 each once ACE & ARB not prescribed due to Other: No CKD or hypertension       atorvastatin (LIPITOR) 10 MG tablet Take 1 tablet (10 mg) by mouth daily 90 tablet 3     blood glucose (NO BRAND SPECIFIED) lancets standard Use to test blood sugar 1 times daily or as directed. 100 each 11     blood glucose (NO BRAND SPECIFIED) lancets standard Use to test blood  sugar 1 times daily or as directed. 1 Box 11     blood glucose monitoring (NO BRAND SPECIFIED) meter device kit Use to test blood sugar 1 times daily or as directed. 1 kit 0     blood glucose monitoring (NO BRAND SPECIFIED) test strip Use to test blood sugars 1 times daily or as directed 100 strip 11     calcipotriene (DOVONOX) 0.005 % external ointment Apply daily to affected areas on body in the evening. 120 g 4     cetirizine (ZYRTEC) 10 MG tablet Take 10 mg by mouth daily as needed for allergies (Summer Months (hayfever))  30 tablet 1     clobetasol (TEMOVATE) 0.05 % external ointment Apply topically daily Apply to Elbows, Legs, and Knees for Psoriasis in the morning. 120 g 4     doxycycline monohydrate (MONODOX) 100 MG capsule Take 1 capsule (100 mg) by mouth 2 times daily (with meals) 180 capsule 0     glipiZIDE (GLUCOTROL XL) 10 MG 24 hr tablet Take 1 tablet (10 mg) by mouth daily 90 tablet 3     lisinopril (PRINIVIL/ZESTRIL) 10 MG tablet Take 1 tablet (10 mg) by mouth daily 90 tablet 3     metFORMIN (GLUCOPHAGE) 1000 MG tablet TAKE 1 TABLET TWICE A DAY WITH MEALS 180 tablet 3     metroNIDAZOLE (METROCREAM) 0.75 % cream Apply topically daily as needed (face)       metroNIDAZOLE (METROCREAM) 0.75 % external cream Apply twice daily to face. 45 g 4     [DISCONTINUED] calcipotriene 0.005 % OINT Apply daily to affected areas on body in the evening. 120 g 4     [DISCONTINUED] clobetasol (TEMOVATE) 0.05 % ointment Apply topically daily Apply to Elbows, Legs, and Knees for Psoriasis in the morning. 120 g 0     No facility-administered encounter medications on file as of 6/21/2019.              Review Of Systems  Skin: As above  Eyes: negative  Ears/Nose/Throat: negative  Respiratory: No shortness of breath, dyspnea on exertion, cough, or hemoptysis  Cardiovascular: negative  Gastrointestinal: negative  Genitourinary: negative  Musculoskeletal: negative  Neurologic: negative  Psychiatric:  negative  Hematologic/Lymphatic/Immunologic: negative  Endocrine: negative      O:   NAD, WDWN, Alert & Oriented, Mood & Affect wnl, Vitals stable   Here today alone   /80 (BP Location: Left arm, Patient Position: Chair, Cuff Size: Adult Regular)   Pulse 72   SpO2 97%    General appearance normal   Vitals stable   Alert, oriented and in no acute distress     2+ inflammatory papules on face with background erythema  Psoriasiform plaques on arms, legs      Eyes: Conjunctivae/lids:Normal     ENT: Lips: normal    MSK:Normal    Pulm: Breathing Normal    Neuro/Psych: Orientation:Normal; Mood/Affect:Normal  A/P:  1. Acne Rosacea  Start Doxycycline twice daily with food for 3 months to help with flare.   Discussed that this can cause sun sensitivity and stomach upset.   Continue metrocream twice daily.     2. Plaque Psoriasis   Alternate between dovonex and clobetasol.   Use clobetasol very sparingly as can cause thinning of skin, only use for max of two week with a one week break in between treatments.   If not clearing return to discuss systemic treatments.     Again, thank you for allowing me to participate in the care of your patient.        Sincerely,        Margi Davies PA-C

## 2019-06-21 NOTE — NURSING NOTE
Chief Complaint   Patient presents with     Psoriasis     need medication refills       Initial /80 (BP Location: Left arm, Patient Position: Chair, Cuff Size: Adult Regular)   Pulse 72   SpO2 97%  Estimated body mass index is 29.84 kg/m  as calculated from the following:    Height as of 6/12/19: 1.829 m (6').    Weight as of 6/12/19: 99.8 kg (220 lb).  Medications and allergies reviewed.    Bret COSTELLO CMA ([facility])

## 2019-06-24 NOTE — PROGRESS NOTES
Arvin Ta is a 49 year old year old male patient here today for recheck psoriasis and acne rosacea. Patient reports that he has been out of his cream for a year. He notes that his acne rosacea has flared on his face. He would like refills on dovonex and clobetasol for his psoriasis. He reports that they work well and he is not interested in systemic medication.  Patient has no other skin complaints today.  Remainder of the HPI, Meds, PMH, Allergies, FH, and SH was reviewed in chart.    Pertinent Hx:   Acne Rosacea and Psoriasis   Past Medical History:   Diagnosis Date     Other psoriasis      Sleep apnea      Type 2 diabetes mellitus without complications (H)     Diabetes mellitus       Past Surgical History:   Procedure Laterality Date     ARTHROPLASTY KNEE UNICOMPARTMENT Left 12/29/2015    Procedure: ARTHROPLASTY KNEE UNICOMPARTMENT;  Surgeon: Lg Meyers MD;  Location: [facility] OR     ORTHOPEDIC SURGERY  2008    Knee     SOFT TISSUE SURGERY  1995    Shoulder     SOFT TISSUE SURGERY  1986    Elbow     SURGICAL HISTORY OF -   1994    (R) Shoulder     SURGICAL HISTORY OF -   1987 approx    Pins in elbow left        Family History   Problem Relation Age of Onset     Diabetes Mother         type 2     Cancer Father 72        Hodgkins Lymphoma     Heart Disease Father         Stent at age 67     Prostate Cancer No family hx of      Colon Cancer No family hx of        Social History     Socioeconomic History     Marital status:      Spouse name: Ramila     Number of children: Not on file     Years of education: 16     Highest education level: Not on file   Occupational History     Employer: [employer]   Social Needs     Financial resource strain: Not on file     Food insecurity:     Worry: Not on file     Inability: Not on file     Transportation needs:     Medical: Not on file     Non-medical: Not on file   Tobacco Use     Smoking status: Never Smoker     Smokeless tobacco: Never Used    Substance and Sexual Activity     Alcohol use: Yes     Comment: occl     Drug use: No     Sexual activity: Yes     Partners: Female   Lifestyle     Physical activity:     Days per week: Not on file     Minutes per session: Not on file     Stress: Not on file   Relationships     Social connections:     Talks on phone: Not on file     Gets together: Not on file     Attends Islam service: Not on file     Active member of club or organization: Not on file     Attends meetings of clubs or organizations: Not on file     Relationship status: Not on file     Intimate partner violence:     Fear of current or ex partner: Not on file     Emotionally abused: Not on file     Physically abused: Not on file     Forced sexual activity: Not on file   Other Topics Concern      Service No     Blood Transfusions No     Caffeine Concern No     Occupational Exposure No     Hobby Hazards No     Sleep Concern Yes     Comment: c pap- sleep apnea     Stress Concern No     Weight Concern No     Special Diet No     Back Care No     Exercise No     Bike Helmet No     Seat Belt Yes     Self-Exams No     Parent/sibling w/ CABG, MI or angioplasty before 65F 55M? Not Asked   Social History Narrative     Not on file       Outpatient Encounter Medications as of 6/21/2019   Medication Sig Dispense Refill     ACE/ARB NOT PRESCRIBED, INTENTIONAL, 1 each once ACE & ARB not prescribed due to Other: No CKD or hypertension       atorvastatin (LIPITOR) 10 MG tablet Take 1 tablet (10 mg) by mouth daily 90 tablet 3     blood glucose (NO BRAND SPECIFIED) lancets standard Use to test blood sugar 1 times daily or as directed. 100 each 11     blood glucose (NO BRAND SPECIFIED) lancets standard Use to test blood sugar 1 times daily or as directed. 1 Box 11     blood glucose monitoring (NO BRAND SPECIFIED) meter device kit Use to test blood sugar 1 times daily or as directed. 1 kit 0     blood glucose monitoring (NO BRAND SPECIFIED) test strip Use to  test blood sugars 1 times daily or as directed 100 strip 11     calcipotriene (DOVONOX) 0.005 % external ointment Apply daily to affected areas on body in the evening. 120 g 4     cetirizine (ZYRTEC) 10 MG tablet Take 10 mg by mouth daily as needed for allergies (Summer Months (hayfever))  30 tablet 1     clobetasol (TEMOVATE) 0.05 % external ointment Apply topically daily Apply to Elbows, Legs, and Knees for Psoriasis in the morning. 120 g 4     doxycycline monohydrate (MONODOX) 100 MG capsule Take 1 capsule (100 mg) by mouth 2 times daily (with meals) 180 capsule 0     glipiZIDE (GLUCOTROL XL) 10 MG 24 hr tablet Take 1 tablet (10 mg) by mouth daily 90 tablet 3     lisinopril (PRINIVIL/ZESTRIL) 10 MG tablet Take 1 tablet (10 mg) by mouth daily 90 tablet 3     metFORMIN (GLUCOPHAGE) 1000 MG tablet TAKE 1 TABLET TWICE A DAY WITH MEALS 180 tablet 3     metroNIDAZOLE (METROCREAM) 0.75 % cream Apply topically daily as needed (face)       metroNIDAZOLE (METROCREAM) 0.75 % external cream Apply twice daily to face. 45 g 4     [DISCONTINUED] calcipotriene 0.005 % OINT Apply daily to affected areas on body in the evening. 120 g 4     [DISCONTINUED] clobetasol (TEMOVATE) 0.05 % ointment Apply topically daily Apply to Elbows, Legs, and Knees for Psoriasis in the morning. 120 g 0     No facility-administered encounter medications on file as of 6/21/2019.              Review Of Systems  Skin: As above  Eyes: negative  Ears/Nose/Throat: negative  Respiratory: No shortness of breath, dyspnea on exertion, cough, or hemoptysis  Cardiovascular: negative  Gastrointestinal: negative  Genitourinary: negative  Musculoskeletal: negative  Neurologic: negative  Psychiatric: negative  Hematologic/Lymphatic/Immunologic: negative  Endocrine: negative      O:   NAD, WDWN, Alert & Oriented, Mood & Affect wnl, Vitals stable   Here today alone   /80 (BP Location: Left arm, Patient Position: Chair, Cuff Size: Adult Regular)   Pulse 72    SpO2 97%    General appearance normal   Vitals stable   Alert, oriented and in no acute distress     2+ inflammatory papules on face with background erythema  Psoriasiform plaques on arms, legs      Eyes: Conjunctivae/lids:Normal     ENT: Lips: normal    MSK:Normal    Pulm: Breathing Normal    Neuro/Psych: Orientation:Normal; Mood/Affect:Normal  A/P:  1. Acne Rosacea  Start Doxycycline twice daily with food for 3 months to help with flare.   Discussed that this can cause sun sensitivity and stomach upset.   Continue metrocream twice daily.     2. Plaque Psoriasis   Alternate between dovonex and clobetasol.   Use clobetasol very sparingly as can cause thinning of skin, only use for max of two week with a one week break in between treatments.   If not clearing return to discuss systemic treatments.

## 2019-07-03 PROBLEM — I10 HTN (HYPERTENSION): Status: ACTIVE | Noted: 2019-07-03

## 2019-10-28 ENCOUNTER — MYC REFILL (OUTPATIENT)
Dept: FAMILY MEDICINE | Facility: CLINIC | Age: 49
End: 2019-10-28

## 2019-10-28 DIAGNOSIS — N18.1 CKD (CHRONIC KIDNEY DISEASE) STAGE 1, GFR 90 ML/MIN OR GREATER: ICD-10-CM

## 2019-10-28 DIAGNOSIS — I10 BENIGN ESSENTIAL HYPERTENSION: ICD-10-CM

## 2019-10-28 NOTE — TELEPHONE ENCOUNTER
"LISINOPRIL 10MG TABS  Last Written Prescription Date:  6/12/2019  Last Fill Quantity: 90,  # refills: 3   Last office visit: 6/12/2019 with prescribing provider:  LEONARDA   Future Office Visit:    Requested Prescriptions   Pending Prescriptions Disp Refills     lisinopril (PRINIVIL/ZESTRIL) 10 MG tablet [Pharmacy Med Name: LISINOPRIL 10MG TABS] 90 tablet 1     Sig: TAKE ONE TABLET BY MOUTH EVERY DAY       ACE Inhibitors (Including Combos) Protocol Passed - 10/28/2019  5:40 PM        Passed - Blood pressure under 140/90 in past 12 months     BP Readings from Last 3 Encounters:   06/21/19 136/80   06/12/19 130/86   10/11/18 118/80                 Passed - Recent (12 mo) or future (30 days) visit within the authorizing provider's specialty     Patient has had an office visit with the authorizing provider or a provider within the authorizing providers department within the previous 12 mos or has a future within next 30 days. See \"Patient Info\" tab in inbasket, or \"Choose Columns\" in Meds & Orders section of the refill encounter.              Passed - Medication is active on med list        Passed - Patient is age 18 or older        Passed - Normal serum creatinine on file in past 12 months     Recent Labs   Lab Test 06/12/19  1520   CR 0.93             Passed - Normal serum potassium on file in past 12 months     Recent Labs   Lab Test 06/12/19  1520   POTASSIUM 4.2               "

## 2019-10-29 RX ORDER — LISINOPRIL 10 MG/1
10 TABLET ORAL DAILY
Qty: 90 TABLET | Refills: 3 | Status: SHIPPED | OUTPATIENT
Start: 2019-10-29 | End: 2020-04-27

## 2019-10-29 RX ORDER — LISINOPRIL 10 MG/1
TABLET ORAL
Qty: 90 TABLET | Refills: 1 | Status: SHIPPED | OUTPATIENT
Start: 2019-10-29 | End: 2020-11-23

## 2019-11-06 ENCOUNTER — HEALTH MAINTENANCE LETTER (OUTPATIENT)
Age: 49
End: 2019-11-06

## 2020-02-16 ENCOUNTER — HEALTH MAINTENANCE LETTER (OUTPATIENT)
Age: 50
End: 2020-02-16

## 2020-04-27 ENCOUNTER — MYC REFILL (OUTPATIENT)
Dept: DERMATOLOGY | Facility: CLINIC | Age: 50
End: 2020-04-27

## 2020-04-27 DIAGNOSIS — L71.9 ACNE ROSACEA: ICD-10-CM

## 2020-04-27 DIAGNOSIS — L40.8 OTHER PSORIASIS: ICD-10-CM

## 2020-04-28 RX ORDER — CALCIPOTRIENE 50 UG/G
OINTMENT TOPICAL
Qty: 120 G | Refills: 0 | Status: SHIPPED | OUTPATIENT
Start: 2020-04-28 | End: 2022-06-21

## 2020-04-28 RX ORDER — CLOBETASOL PROPIONATE 0.5 MG/G
OINTMENT TOPICAL DAILY
Qty: 120 G | Refills: 0 | Status: SHIPPED | OUTPATIENT
Start: 2020-04-28 | End: 2022-04-05

## 2020-05-18 DIAGNOSIS — E11.21 TYPE 2 DIABETES MELLITUS WITH DIABETIC NEPHROPATHY, WITHOUT LONG-TERM CURRENT USE OF INSULIN (H): ICD-10-CM

## 2020-05-22 RX ORDER — GLIPIZIDE 10 MG/1
TABLET, FILM COATED, EXTENDED RELEASE ORAL
Qty: 90 TABLET | Refills: 3 | OUTPATIENT
Start: 2020-05-22

## 2020-05-22 NOTE — TELEPHONE ENCOUNTER
Pt called and appointment made for next week. He will not be out of medications prior to then and said he will wait for his appointment. Anaya Umanzor RN

## 2020-06-05 DIAGNOSIS — Z13.6 CARDIOVASCULAR SCREENING; LDL GOAL LESS THAN 160: ICD-10-CM

## 2020-06-05 DIAGNOSIS — E11.21 TYPE 2 DIABETES MELLITUS WITH DIABETIC NEPHROPATHY, WITHOUT LONG-TERM CURRENT USE OF INSULIN (H): ICD-10-CM

## 2020-06-08 NOTE — TELEPHONE ENCOUNTER
left message for patient to return call.  I asked him to schedule appointment last month, he did, but cancelled.    He needs appointment.  Last seen 6/12/19  Marva Amos RN

## 2020-06-09 RX ORDER — ATORVASTATIN CALCIUM 10 MG/1
TABLET, FILM COATED ORAL
Qty: 90 TABLET | Refills: 3 | Status: SHIPPED | OUTPATIENT
Start: 2020-06-09 | End: 2021-06-01

## 2020-07-08 ENCOUNTER — NURSE TRIAGE (OUTPATIENT)
Dept: NURSING | Facility: CLINIC | Age: 50
End: 2020-07-08

## 2020-07-08 ENCOUNTER — HOSPITAL ENCOUNTER (EMERGENCY)
Facility: CLINIC | Age: 50
Discharge: HOME | End: 2020-07-08
Attending: EMERGENCY MEDICINE | Admitting: EMERGENCY MEDICINE
Payer: COMMERCIAL

## 2020-07-08 VITALS
SYSTOLIC BLOOD PRESSURE: 123 MMHG | RESPIRATION RATE: 19 BRPM | HEIGHT: 72 IN | BODY MASS INDEX: 29.12 KG/M2 | OXYGEN SATURATION: 96 % | WEIGHT: 215 LBS | DIASTOLIC BLOOD PRESSURE: 83 MMHG | TEMPERATURE: 97.8 F | HEART RATE: 60 BPM

## 2020-07-08 DIAGNOSIS — R22.0 FACIAL SWELLING: ICD-10-CM

## 2020-07-08 DIAGNOSIS — T63.441A BEE STING REACTION, ACCIDENTAL OR UNINTENTIONAL, INITIAL ENCOUNTER: ICD-10-CM

## 2020-07-08 PROCEDURE — 25000125 ZZHC RX 250: Performed by: EMERGENCY MEDICINE

## 2020-07-08 PROCEDURE — 99282 EMERGENCY DEPT VISIT SF MDM: CPT | Performed by: EMERGENCY MEDICINE

## 2020-07-08 PROCEDURE — 99284 EMERGENCY DEPT VISIT MOD MDM: CPT | Mod: Z6 | Performed by: EMERGENCY MEDICINE

## 2020-07-08 RX ORDER — DEXAMETHASONE SODIUM PHOSPHATE 4 MG/ML
10 VIAL (ML) INJECTION ONCE
Status: COMPLETED | OUTPATIENT
Start: 2020-07-08 | End: 2020-07-08

## 2020-07-08 RX ORDER — DEXAMETHASONE 2 MG/1
10 TABLET ORAL ONCE
Qty: 5 TABLET | Refills: 0 | Status: SHIPPED | OUTPATIENT
Start: 2020-07-08 | End: 2020-11-23

## 2020-07-08 RX ORDER — EPINEPHRINE 0.3 MG/.3ML
0.3 INJECTION SUBCUTANEOUS
Qty: 2 EACH | Refills: 0 | Status: SHIPPED | OUTPATIENT
Start: 2020-07-08

## 2020-07-08 RX ADMIN — DEXAMETHASONE SODIUM PHOSPHATE 10 MG: 4 INJECTION, SOLUTION INTRAMUSCULAR; INTRAVENOUS at 21:58

## 2020-07-08 ASSESSMENT — MIFFLIN-ST. JEOR: SCORE: 1878.23

## 2020-07-08 ASSESSMENT — ENCOUNTER SYMPTOMS
FEVER: 0
ALLERGIC/IMMUNOLOGIC COMMENTS: SEE HPI
SHORTNESS OF BREATH: 0
ABDOMINAL PAIN: 0

## 2020-07-08 NOTE — ED AVS SNAPSHOT
[facility] Emergency Department  [address] [address]  Phone:  [phone]  Fax:  [phone]                                    Arvin Ta   MRN: [id number]    Department:  [facility] Emergency Department   Date of Visit:  7/8/2020           After Visit Summary Signature Page    I have received my discharge instructions, and my questions have been answered. I have discussed any challenges I see with this plan with the nurse or doctor.    ..........................................................................................................................................  Patient/Patient Representative Signature      ..........................................................................................................................................  Patient Representative Print Name and Relationship to Patient    ..................................................               ................................................  Date                                   Time    ..........................................................................................................................................  Reviewed by Signature/Title    ...................................................              ..............................................  Date                                               Time          22EPIC Rev 08/18

## 2020-07-09 NOTE — ED NOTES
Stung by a bee in the left eyelid around 6pm today. The patient has swelling to both cheeks, eyes and forehead. Wife at bedside. No effect on airway, lips or tongue. Took 50mg benadryl at 1915 this evening.

## 2020-07-09 NOTE — DISCHARGE INSTRUCTIONS
Take steroid pill.   Use EpiPen if trouble breathing, throat swelling, or other severe symptoms develop  You can take Benadryl regularly.  You can also take Claritin, Allegra, or Zyrtec.

## 2020-07-09 NOTE — TELEPHONE ENCOUNTER
"\"I was just stung by a bee right below the eye brow, but not actually on the eye. It started to swell. I took Benadryl and I have ice on it. The swelling looks like it's starting to go down a little.\" denies other sx. Triaged and advised to monitor closely due to location of the sting, call back or go to ER as needed.  Cathy Newman RN [facility]        Additional Information    Negative: Not a bee, wasp, hornet, or yellow jacket sting    Negative: Ring stuck on swollen finger (or toe) following bee sting    Negative: [1] Widespread hives, itching or facial swelling AND [2] started within 2 hours of sting (Exception: only at site of sting)    Negative: [1] Vomiting or abdominal cramps AND [2] started within 2 hours of sting    Negative: [1] Gave epinephrine shot AND [2] no symptoms now    Negative: Sting inside the mouth    Negative: Sting on eyeball (e.g., cornea)    Negative: Patient sounds very sick or weak to the triager    Negative: [1] Widespread hives, itching or facial swelling AND [2] started over 2 hours after sting  (Exception: only at site of sting)    Negative: [1] Scab is present AND [2] it drains pus or increases in size AND [3] not improved after applying  antibiotic ointment for 2 days    Negative: [1] Red or very tender (to touch) area AND [2] started over 24 hours after the sting    Negative: [1] Red or very tender (to touch) area AND [2] getting larger over 48 hours after the sting    Negative: Swelling is huge (e.g., > 4 inches or 10 cm, spreads beyond wrist or ankle)    Negative: More than 50 stings    Negative: [1] Fever AND [2] red area    Negative: [1] Fever AND [2] area is very tender to touch    Negative: [1] Red streak or red line AND [2] length > 2 inches (5 cm)    Negative: Preventing stings, questions about    Normal local reaction to bee, wasp, or yellow jacket sting    Protocols used: BEE OR YELLOW JACKET STING-A-AH      "

## 2020-07-09 NOTE — ED PROVIDER NOTES
History     Chief Complaint   Patient presents with     Insect Bite     left eye bee sting at 1800,  pt has facial edema.   denies sob or difficulty breathing     HPI  Arvin Ta is a 50 year old male who presents to the emergency department with concerns regarding bee sting which occurred around 6 PM, 3.5 hours prior to emergency department arrival.  Patient states he was outside, and subsequently stung by the bee on the left eyelid.  Patient began experiencing increased amounts of facial swelling, left greater than right.  Has been stung by bees previously, however has not had any severe reaction.  Patient denies any nausea, or vomiting.  No diaphoresis.  Denies any chest pain, shortness of breath.  No wheezing.  No abdominal pain, nausea, or vomiting.  No skin rash.  He has had a fever, with other seasonal allergies.  No medication allergies or other allergic reactions previously.  Has never required EpiPen.    Allergies:  No Known Allergies    Problem List:    Patient Active Problem List    Diagnosis Date Noted     HTN (hypertension) 07/03/2019     Priority: Medium     CKD (chronic kidney disease) stage 1, GFR 90 ml/min or greater 12/14/2017     Priority: Medium     History of arthroplasty of left knee 12/29/2015     Priority: Medium     Type 2 diabetes mellitus with diabetic nephropathy (H) 06/22/2015     Priority: Medium     Diagnosis in June, 2013.        CARDIOVASCULAR SCREENING; LDL GOAL LESS THAN 160 10/31/2010     Priority: Medium     Hypersomnia with sleep apnea 08/29/2006     Priority: Medium     On CPAP January 2005.  6/24/2015:Uses CPAP every night.          Other psoriasis 08/29/2006     Priority: Medium     Other tear of cartilage or meniscus of knee, current 08/29/2006     Priority: Medium     posterior medial on left          Past Medical History:    Past Medical History:   Diagnosis Date     Other psoriasis      Sleep apnea      Type 2 diabetes mellitus without complications (H)        Past  Surgical History:    Past Surgical History:   Procedure Laterality Date     ARTHROPLASTY KNEE UNICOMPARTMENT Left 12/29/2015    Procedure: ARTHROPLASTY KNEE UNICOMPARTMENT;  Surgeon: Lg Meyers MD;  Location: [facility] OR     ORTHOPEDIC SURGERY  2008    Knee     SOFT TISSUE SURGERY  1995    Shoulder     SOFT TISSUE SURGERY  1986    Elbow     SURGICAL HISTORY OF -   1994    (R) Shoulder     SURGICAL HISTORY OF -   1987 approx    Pins in elbow left       Family History:    Family History   Problem Relation Age of Onset     Diabetes Mother         type 2     Cancer Father 72        Hodgkins Lymphoma     Heart Disease Father         Stent at age 67     Prostate Cancer No family hx of      Colon Cancer No family hx of        Social History:  Marital Status:   [2]  Social History     Tobacco Use     Smoking status: Never Smoker     Smokeless tobacco: Never Used   Substance Use Topics     Alcohol use: Yes     Comment: occl     Drug use: No        Medications:    dexamethasone (DECADRON) 2 MG tablet  EPINEPHrine (ANY BX GENERIC EQUIV) 0.3 MG/0.3ML injection 2-pack  ACE/ARB NOT PRESCRIBED, INTENTIONAL,  atorvastatin (LIPITOR) 10 MG tablet  blood glucose (NO BRAND SPECIFIED) lancets standard  blood glucose (NO BRAND SPECIFIED) lancets standard  blood glucose monitoring (NO BRAND SPECIFIED) meter device kit  blood glucose monitoring (NO BRAND SPECIFIED) test strip  calcipotriene (DOVONOX) 0.005 % external ointment  cetirizine (ZYRTEC) 10 MG tablet  clobetasol (TEMOVATE) 0.05 % external ointment  doxycycline monohydrate (MONODOX) 100 MG capsule  glipiZIDE (GLUCOTROL XL) 10 MG 24 hr tablet  lisinopril (PRINIVIL/ZESTRIL) 10 MG tablet  lisinopril (ZESTRIL) 10 MG tablet  metFORMIN (GLUCOPHAGE) 1000 MG tablet  metroNIDAZOLE (METROCREAM) 0.75 % cream  metroNIDAZOLE (METROCREAM) 0.75 % external cream          Review of Systems   Constitutional: Negative for fever.   Respiratory: Negative for shortness of breath.     Cardiovascular: Negative for chest pain.   Gastrointestinal: Negative for abdominal pain.   Skin: Negative.    Allergic/Immunologic:        See HPI   All other systems reviewed and are negative.      Physical Exam   BP: 134/85  Pulse: 68  Temp: 97.8  F (36.6  C)  Resp: 18  Height: 182.9 cm (6')  Weight: 97.5 kg (215 lb)  SpO2: 98 %      Physical Exam  /85   Pulse 68   Temp 97.8  F (36.6  C) (Temporal)   Resp 18   Ht 1.829 m (6')   Wt 97.5 kg (215 lb)   SpO2 98%   BMI 29.16 kg/m    General: alert and in no acute distress  Head: atraumatic, normocephalic.  Facial swelling is present, especially in the periorbital areas  Abd: nondistended  Musculoskel/Extremities: normal extremities, no apparent edema, and full AROM of major joints  Skin: no rashes, no diaphoresis and skin color normal  Neuro: Patient awake, alert, oriented, speech is fluent, gait is normal  Psychiatric: affect/mood normal, cooperative,        ED Course        Procedures               Critical Care time:  none               No results found for this or any previous visit (from the past 24 hour(s)).    Medications - No data to display    Assessments & Plan (with Medical Decision Making)  50 year old male, presenting to the emergency department with concerns regarding facial swelling in the context of bee sting 3.5 hours prior to emergency department arrival.  Does have notable facial swelling.  Patient with no swelling of the mouth, or oropharynx.  Lungs are clear to auscultation.  Vitals are normal.  Patient will be prescribed dexamethasone x1, with EpiPen also prescribed.  No signs of anaphylaxis on exam.  Patient reassured, and will be discharged home, and encouraged to follow-up in clinic as needed, and be seen if new, worsening symptoms develop.     I have reviewed the nursing notes.    I have reviewed the findings, diagnosis, plan and need for follow up with the patient.       New Prescriptions    DEXAMETHASONE (DECADRON) 2 MG  TABLET    Take 5 tablets (10 mg) by mouth once for 1 dose    EPINEPHRINE (ANY BX GENERIC EQUIV) 0.3 MG/0.3ML INJECTION 2-PACK    Inject 0.3 mLs (0.3 mg) into the muscle once as needed for anaphylaxis       Final diagnoses:   Bee sting reaction, accidental or unintentional, initial encounter   Facial swelling       7/8/2020   [facility] EMERGENCY DEPARTMENT     Alli Bryan MD  07/08/20 [id number]

## 2020-11-06 DIAGNOSIS — E11.21 TYPE 2 DIABETES MELLITUS WITH DIABETIC NEPHROPATHY, WITHOUT LONG-TERM CURRENT USE OF INSULIN (H): ICD-10-CM

## 2020-11-06 RX ORDER — GLIPIZIDE 10 MG/1
10 TABLET, FILM COATED, EXTENDED RELEASE ORAL DAILY
Qty: 30 TABLET | Refills: 1 | Status: SHIPPED | OUTPATIENT
Start: 2020-11-06 | End: 2020-11-23

## 2020-11-06 NOTE — TELEPHONE ENCOUNTER
"Routing refill request to provider for review/approval because:  A break in medication? This was last ordered for 1 year on 6/12/2019, can't be taking regularly?? Okay to refill until his appt??       Next 5 appointments (look out 90 days)    Nov 23, 2020 10:40 AM  SHORT with Omar Parker MD  [facility] ([facility]) [address] [place] [address] [phone]           Requested Prescriptions   Pending Prescriptions Disp Refills     glipiZIDE (GLUCOTROL XL) 10 MG 24 hr tablet  3     Sig: Take 1 tablet (10 mg) by mouth daily       Sulfonylurea Agents Failed - 11/6/2020  2:52 PM        Failed - Patient has documented A1c within the specified period of time.     If HgbA1C is 8 or greater, it needs to be on file within the past 3 months.  If less than 8, must be on file within the past 6 months.     Recent Labs   Lab Test 06/12/19  1520   A1C 7.6*             Failed - Patient has a recent creatinine (normal) within the past 12 mos.     Recent Labs   Lab Test 06/12/19  1520   CR 0.93       Ok to refill medication if creatinine is low          Passed - Medication is active on med list        Passed - Patient is age 18 or older        Passed - Recent (6 mo) or future (30 days) visit within the authorizing provider's specialty     Patient had office visit in the last 6 months or has a visit in the next 30 days with authorizing provider or within the authorizing provider's specialty.  See \"Patient Info\" tab in inbasket, or \"Choose Columns\" in Meds & Orders section of the refill encounter.               Hannah RAMÍREZ RN, BSN      "

## 2020-11-06 NOTE — TELEPHONE ENCOUNTER
Pt has appt scheduled 11/23/20. Requesting one month refill of his Glipizide. Pt is currently out of his medication.  [facility]

## 2020-11-23 ENCOUNTER — OFFICE VISIT (OUTPATIENT)
Dept: FAMILY MEDICINE | Facility: CLINIC | Age: 50
End: 2020-11-23
Payer: COMMERCIAL

## 2020-11-23 VITALS
SYSTOLIC BLOOD PRESSURE: 114 MMHG | RESPIRATION RATE: 14 BRPM | HEIGHT: 72 IN | TEMPERATURE: 97.6 F | DIASTOLIC BLOOD PRESSURE: 80 MMHG | WEIGHT: 220 LBS | HEART RATE: 60 BPM | BODY MASS INDEX: 29.8 KG/M2

## 2020-11-23 DIAGNOSIS — I10 ESSENTIAL HYPERTENSION WITH GOAL BLOOD PRESSURE LESS THAN 140/90: ICD-10-CM

## 2020-11-23 DIAGNOSIS — N18.1 CKD (CHRONIC KIDNEY DISEASE) STAGE 1, GFR 90 ML/MIN OR GREATER: ICD-10-CM

## 2020-11-23 DIAGNOSIS — E11.21 TYPE 2 DIABETES MELLITUS WITH DIABETIC NEPHROPATHY, WITHOUT LONG-TERM CURRENT USE OF INSULIN (H): Primary | ICD-10-CM

## 2020-11-23 DIAGNOSIS — G47.30 HYPERSOMNIA WITH SLEEP APNEA: ICD-10-CM

## 2020-11-23 DIAGNOSIS — G47.10 HYPERSOMNIA WITH SLEEP APNEA: ICD-10-CM

## 2020-11-23 DIAGNOSIS — Z11.59 NEED FOR HEPATITIS C SCREENING TEST: ICD-10-CM

## 2020-11-23 LAB
ANION GAP SERPL CALCULATED.3IONS-SCNC: 5 MMOL/L (ref 3–14)
BUN SERPL-MCNC: 13 MG/DL (ref 7–30)
CALCIUM SERPL-MCNC: 9.6 MG/DL (ref 8.5–10.1)
CHLORIDE SERPL-SCNC: 102 MMOL/L (ref 94–109)
CHOLEST SERPL-MCNC: 125 MG/DL
CO2 SERPL-SCNC: 28 MMOL/L (ref 20–32)
CREAT SERPL-MCNC: 0.79 MG/DL (ref 0.66–1.25)
CREAT UR-MCNC: 84 MG/DL
GFR SERPL CREATININE-BSD FRML MDRD: >90 ML/MIN/{1.73_M2}
GLUCOSE SERPL-MCNC: 135 MG/DL (ref 70–99)
HBA1C MFR BLD: 7.4 % (ref 0–5.6)
HDLC SERPL-MCNC: 44 MG/DL
LDLC SERPL CALC-MCNC: 56 MG/DL
MICROALBUMIN UR-MCNC: 5 MG/L
MICROALBUMIN/CREAT UR: 6.5 MG/G CR (ref 0–17)
NONHDLC SERPL-MCNC: 81 MG/DL
POTASSIUM SERPL-SCNC: 4.1 MMOL/L (ref 3.4–5.3)
SODIUM SERPL-SCNC: 135 MMOL/L (ref 133–144)
TRIGL SERPL-MCNC: 126 MG/DL

## 2020-11-23 PROCEDURE — 86803 HEPATITIS C AB TEST: CPT | Performed by: FAMILY MEDICINE

## 2020-11-23 PROCEDURE — 99207 PR FOOT EXAM NO CHARGE: CPT | Performed by: FAMILY MEDICINE

## 2020-11-23 PROCEDURE — 80061 LIPID PANEL: CPT | Performed by: FAMILY MEDICINE

## 2020-11-23 PROCEDURE — 80048 BASIC METABOLIC PNL TOTAL CA: CPT | Performed by: FAMILY MEDICINE

## 2020-11-23 PROCEDURE — 99214 OFFICE O/P EST MOD 30 MIN: CPT | Performed by: FAMILY MEDICINE

## 2020-11-23 PROCEDURE — 82043 UR ALBUMIN QUANTITATIVE: CPT | Performed by: FAMILY MEDICINE

## 2020-11-23 PROCEDURE — 36415 COLL VENOUS BLD VENIPUNCTURE: CPT | Performed by: FAMILY MEDICINE

## 2020-11-23 PROCEDURE — 83036 HEMOGLOBIN GLYCOSYLATED A1C: CPT | Performed by: FAMILY MEDICINE

## 2020-11-23 RX ORDER — LISINOPRIL 10 MG/1
10 TABLET ORAL DAILY
Qty: 90 TABLET | Refills: 3 | Status: SHIPPED | OUTPATIENT
Start: 2020-11-23 | End: 2021-07-15

## 2020-11-23 RX ORDER — GLIPIZIDE 10 MG/1
10 TABLET, FILM COATED, EXTENDED RELEASE ORAL DAILY
Qty: 90 TABLET | Refills: 3 | Status: SHIPPED | OUTPATIENT
Start: 2020-11-23 | End: 2021-07-15

## 2020-11-23 ASSESSMENT — MIFFLIN-ST. JEOR: SCORE: 1900.91

## 2020-11-23 NOTE — PATIENT INSTRUCTIONS
ASSESSMENT/PLAN:                                                    Lifestyle recommendations:regular exercise, at least 150 minutes per week (average 30 minutes 5 times a week)  keep working on losing weight (ideal BMI-body mass index is <25)  Diabetic recommendations:-return for follow-up visit in 6 month(s)    (E11.21) Type 2 diabetes mellitus with diabetic nephropathy, without long-term current use of insulin (H)  (primary encounter diagnosis)  Comment: doing well  Plan: Hemoglobin A1c, Basic metabolic panel, FOOT         EXAM, glipiZIDE (GLUCOTROL XL) 10 MG 24 hr         tablet, Lipid panel reflex to direct LDL,         CANCELED: Lipid panel reflex to direct LDL         Fasting        No change in current treatment plan.  REfills.      (N18.1) CKD (chronic kidney disease) stage 1, GFR 90 ml/min or greater  Comment: due for follow-up   Plan: Albumin Random Urine Quantitative with Creat         Ratio, lisinopril (ZESTRIL) 10 MG tablet        Blood and urine tests to check on kidney function today.     (I10) Essential hypertension with goal blood pressure less than 140/90  Comment: doing well  Plan: No change in current treatment plan.     (G47.10,  G47.30) Hypersomnia with sleep apnea    (Z11.59) Need for hepatitis C screening test  Comment:   Plan: Hepatitis C Screen Reflex to HCV RNA Quant and         Genotype

## 2020-11-23 NOTE — PROGRESS NOTES
Subjective     Arvin Ta is a 50 year old male who presents to clinic today for the following health issues:  Chief Complaint   Patient presents with     Diabetes      Low back gets sore  At times.  Worse when on feet all day.  Concern about kidneys.  Feels tight and dull pain.  No dysuria.  No hematuria.     Diabetes Follow-up      How often are you checking your blood sugar? occ     What concerns do you have today about your diabetes? Blood sugar is often over 200-250 when off glipizide.  Normal 140-165 in the AM.  Better later in the day.      Glucometer range within the past month=    No side effects noted.   Lab Results   Component Value Date    A1C 7.4 11/23/2020   Taking metformin 1000mg twice daily.  NOt missing doses.     He was off glipizide for 2-3 weeks.  Wanted to see if he could  Get by without the medication.       Do you have any of these symptoms? (Select all that apply)  No numbness or tingling in feet.  No redness, sores or blisters on feet.  No complaints of excessive thirst.  No reports of blurry vision.  No significant changes to weight.    Have you had a diabetic eye exam in the last 12 months? No    He has seen Dermatology for his psoriasis.  Doing OK.  Flares at times.             Hyperlipidemia Follow-Up      Are you regularly taking any medication or supplement to lower your cholesterol?   Yes- atorvastatin    Are you having muscle aches or other side effects that you think could be caused by your cholesterol lowering medication?  No    Hypertension Follow-up      Do you check your blood pressure regularly outside of the clinic? Yes     Are you following a low salt diet? no    Are your blood pressures ever more than 140 on the top number (systolic) OR more   than 90 on the bottom number (diastolic), for example 140/90? No    BP Readings from Last 2 Encounters:   11/23/20 114/80   07/08/20 123/83     Hemoglobin A1C (%)   Date Value   11/23/2020 7.4 (H)   06/12/2019 7.6 (H)      LDL Cholesterol Calculated (mg/dL)   Date Value   06/12/2019 52   12/13/2017 69         How many servings of fruits and vegetables do you eat daily?  0-1    On average, how many sweetened beverages do you drink each day (Examples: soda, juice, sweet tea, etc.  Do NOT count diet or artificially sweetened beverages)?   0    How many days per week do you exercise enough to make your heart beat faster? 3 x a week    How many minutes a day do you exercise enough to make your heart beat faster? 10 - 19    How many days per week do you miss taking your medication? 0  Exercise:yard work.      Last clinic visit:6/12/2019  Medication or other changes at last visit:glipizide increased at that time.   Weight since last visit? Unchanged   Wt Readings from Last 5 Encounters:   11/23/20 99.8 kg (220 lb)   07/08/20 97.5 kg (215 lb)   06/12/19 99.8 kg (220 lb)   10/11/18 100.8 kg (222 lb 3.2 oz)   12/13/17 98.4 kg (217 lb)      History   Smoking Status     Never Smoker   Smokeless Tobacco     Never Used     Past medical history reviewed and updated    Patient Active Problem List    Diagnosis Date Noted     HTN (hypertension) 07/03/2019     Priority: Medium     CKD (chronic kidney disease) stage 1, GFR 90 ml/min or greater 12/14/2017     Priority: Medium     History of arthroplasty of left knee 12/29/2015     Priority: Medium     Type 2 diabetes mellitus with diabetic nephropathy (H) 06/22/2015     Priority: Medium     Diagnosis in June, 2013.        CARDIOVASCULAR SCREENING; LDL GOAL LESS THAN 160 10/31/2010     Priority: Medium     Hypersomnia with sleep apnea 08/29/2006     Priority: Medium     On CPAP January 2005.  6/24/2015:Uses CPAP every night.          Other psoriasis 08/29/2006     Priority: Medium     Other tear of cartilage or meniscus of knee, current 08/29/2006     Priority: Medium     posterior medial on left       Current Outpatient Medications   Medication Sig Dispense Refill     atorvastatin (LIPITOR) 10 MG  tablet TAKE 1 TABLET DAILY 90 tablet 3     calcipotriene (DOVONOX) 0.005 % external ointment Apply daily to affected areas on body in the evening. 120 g 0     cetirizine (ZYRTEC) 10 MG tablet Take 10 mg by mouth daily as needed for allergies (Summer Months (hayfever))  30 tablet 1     clobetasol (TEMOVATE) 0.05 % external ointment Apply topically daily Apply to Elbows, Legs, and Knees for Psoriasis in the morning. 120 g 0     glipiZIDE (GLUCOTROL XL) 10 MG 24 hr tablet Take 1 tablet (10 mg) by mouth daily 30 tablet 1     lisinopril (ZESTRIL) 10 MG tablet Take 1 tablet (10 mg) by mouth daily 90 tablet 1     metFORMIN (GLUCOPHAGE) 1000 MG tablet TAKE 1 TABLET TWICE A DAY WITH MEALS 180 tablet 3     metroNIDAZOLE (METROCREAM) 0.75 % external cream Apply twice daily to face. 45 g 0     ACE/ARB NOT PRESCRIBED, INTENTIONAL, 1 each once ACE & ARB not prescribed due to Other: No CKD or hypertension       blood glucose (NO BRAND SPECIFIED) lancets standard Use to test blood sugar 1 times daily or as directed. 100 each 11     blood glucose monitoring (NO BRAND SPECIFIED) meter device kit Use to test blood sugar 1 times daily or as directed. 1 kit 0     blood glucose monitoring (NO BRAND SPECIFIED) test strip Use to test blood sugars 1 times daily or as directed 100 strip 11     EPINEPHrine (ANY BX GENERIC EQUIV) 0.3 MG/0.3ML injection 2-pack Inject 0.3 mLs (0.3 mg) into the muscle once as needed for anaphylaxis 2 each 0     ROS:General: No change in weight, sleep or appetite.  Normal energy.  No fever or chills  Resp: No coughing, wheezing or shortness of breath.    CV: No chest pains or palpitations  GI: No nausea, vomiting,  heartburn, abdominal pain, diarrhea, constipation or change in bowel habits  : No urinary frequency or dysuria, bladder or kidney problems  Musculoskeletal: No significant muscle or joint pains  Psychiatric: No problems with anxiety, depression or mental health    OBJECTIVE:Blood pressure 114/80,  pulse 60, temperature 97.6  F (36.4  C), temperature source Tympanic, resp. rate 14, height 1.829 m (6'), weight 99.8 kg (220 lb). BMI=Body mass index is 29.84 kg/m .  GENERAL APPEARANCE ADULT: Alert, no acute distress  NECK: No adenopathy,masses or thyromegaly  RESP: lungs clear to auscultation   CV: normal rate, regular rhythm, no murmur or gallop  ABDOMEN: soft, no organomegaly, masses or tenderness  MS: extremities normal, no peripheral edema   Foot exam:normal DP and PT pulses, no trophic changes or ulcerative lesions and normal monofilament exam    ASSESSMENT/PLAN:                                                    Lifestyle recommendations:regular exercise, at least 150 minutes per week (average 30 minutes 5 times a week)  keep working on losing weight (ideal BMI-body mass index is <25)  Diabetic recommendations:-return for follow-up visit in 6 month(s)    (E11.21) Type 2 diabetes mellitus with diabetic nephropathy, without long-term current use of insulin (H)  (primary encounter diagnosis)  Comment: doing well  Plan: Hemoglobin A1c, Basic metabolic panel, FOOT         EXAM, glipiZIDE (GLUCOTROL XL) 10 MG 24 hr         tablet, Lipid panel reflex to direct LDL,         CANCELED: Lipid panel reflex to direct LDL         Fasting        No change in current treatment plan.  REfills.      (N18.1) CKD (chronic kidney disease) stage 1, GFR 90 ml/min or greater  Comment: due for follow-up   Plan: Albumin Random Urine Quantitative with Creat         Ratio, lisinopril (ZESTRIL) 10 MG tablet        Blood and urine tests to check on kidney function today.     (I10) Essential hypertension with goal blood pressure less than 140/90  Comment: doing well  Plan: No change in current treatment plan.     (G47.10,  G47.30) Hypersomnia with sleep apnea    (Z11.59) Need for hepatitis C screening test  Comment:   Plan: Hepatitis C Screen Reflex to HCV RNA Quant and         Genotype             Diabetes Type II, A1c Controlled,  non-insulin dependent   Associated with the following complications    Renal Complications:  CKD

## 2020-11-23 NOTE — NURSING NOTE
Chief Complaint   Patient presents with     Diabetes       Initial /80   Pulse 60   Temp 97.6  F (36.4  C) (Tympanic)   Resp 14   Ht 1.829 m (6')   Wt 99.8 kg (220 lb)   BMI 29.84 kg/m   Estimated body mass index is 29.84 kg/m  as calculated from the following:    Height as of this encounter: 1.829 m (6').    Weight as of this encounter: 99.8 kg (220 lb).    Patient presents to the clinic using     Health Maintenance that is potentially due pending provider review:          Is there anyone who you would like to be able to receive your results?   If yes have patient fill out MARINO

## 2020-11-24 LAB — HCV AB SERPL QL IA: NONREACTIVE

## 2020-11-24 NOTE — RESULT ENCOUNTER NOTE
"Hi Arvin,   Microalbumin urine test is normal indicating no increased amount of protein in urine. This is a measure of kidney function.    Hepatitis C test is negative.    Lipid tests including total cholesterol, triglycerides, HDL (\"good cholesterol\") and LDL (\"bad cholesterol\") are normal. .    Your tests look good.   ARPIT FRANCIS MD"

## 2020-11-29 ENCOUNTER — HEALTH MAINTENANCE LETTER (OUTPATIENT)
Age: 50
End: 2020-11-29

## 2021-05-30 ENCOUNTER — HEALTH MAINTENANCE LETTER (OUTPATIENT)
Age: 51
End: 2021-05-30

## 2021-05-31 DIAGNOSIS — E11.21 TYPE 2 DIABETES MELLITUS WITH DIABETIC NEPHROPATHY, WITHOUT LONG-TERM CURRENT USE OF INSULIN (H): ICD-10-CM

## 2021-05-31 DIAGNOSIS — Z13.6 CARDIOVASCULAR SCREENING; LDL GOAL LESS THAN 160: ICD-10-CM

## 2021-06-01 RX ORDER — ATORVASTATIN CALCIUM 10 MG/1
TABLET, FILM COATED ORAL
Qty: 90 TABLET | Refills: 0 | Status: SHIPPED | OUTPATIENT
Start: 2021-06-01 | End: 2021-07-15

## 2021-06-01 NOTE — TELEPHONE ENCOUNTER
Prescription for statin approved per [facility] Refill Protocol.  Routing refill request for metformin to provider for review/approval because:  Patient needs to be seen because:  Due for diabetic recheck. Uses mail order pharmacy so needs 3 month supply sent    Hannah RAMÍREZ RN, BSN

## 2021-07-15 ENCOUNTER — OFFICE VISIT (OUTPATIENT)
Dept: FAMILY MEDICINE | Facility: CLINIC | Age: 51
End: 2021-07-15
Payer: COMMERCIAL

## 2021-07-15 VITALS
HEIGHT: 72 IN | TEMPERATURE: 98 F | WEIGHT: 215 LBS | SYSTOLIC BLOOD PRESSURE: 120 MMHG | RESPIRATION RATE: 16 BRPM | HEART RATE: 60 BPM | BODY MASS INDEX: 29.12 KG/M2 | DIASTOLIC BLOOD PRESSURE: 70 MMHG

## 2021-07-15 DIAGNOSIS — S69.92XA INJURY OF LEFT WRIST, INITIAL ENCOUNTER: ICD-10-CM

## 2021-07-15 DIAGNOSIS — Z12.11 SPECIAL SCREENING FOR MALIGNANT NEOPLASMS, COLON: ICD-10-CM

## 2021-07-15 DIAGNOSIS — S89.90XA INJURY OF CALF: ICD-10-CM

## 2021-07-15 DIAGNOSIS — G89.29 CHRONIC LEFT-SIDED LOW BACK PAIN WITHOUT SCIATICA: ICD-10-CM

## 2021-07-15 DIAGNOSIS — E11.21 TYPE 2 DIABETES MELLITUS WITH DIABETIC NEPHROPATHY, WITHOUT LONG-TERM CURRENT USE OF INSULIN (H): Primary | ICD-10-CM

## 2021-07-15 DIAGNOSIS — N18.1 CKD (CHRONIC KIDNEY DISEASE) STAGE 1, GFR 90 ML/MIN OR GREATER: ICD-10-CM

## 2021-07-15 DIAGNOSIS — E78.5 HYPERLIPIDEMIA LDL GOAL <100: ICD-10-CM

## 2021-07-15 DIAGNOSIS — M54.50 CHRONIC LEFT-SIDED LOW BACK PAIN WITHOUT SCIATICA: ICD-10-CM

## 2021-07-15 LAB
HBA1C MFR BLD: 6.5 % (ref 0–5.6)
HOLD SPECIMEN: NORMAL

## 2021-07-15 PROCEDURE — 36415 COLL VENOUS BLD VENIPUNCTURE: CPT | Performed by: FAMILY MEDICINE

## 2021-07-15 PROCEDURE — 99214 OFFICE O/P EST MOD 30 MIN: CPT | Performed by: FAMILY MEDICINE

## 2021-07-15 PROCEDURE — 83036 HEMOGLOBIN GLYCOSYLATED A1C: CPT | Performed by: FAMILY MEDICINE

## 2021-07-15 RX ORDER — ATORVASTATIN CALCIUM 10 MG/1
10 TABLET, FILM COATED ORAL DAILY
Qty: 90 TABLET | Refills: 3 | Status: SHIPPED | OUTPATIENT
Start: 2021-07-15 | End: 2022-08-25

## 2021-07-15 RX ORDER — GLIPIZIDE 10 MG/1
10 TABLET, FILM COATED, EXTENDED RELEASE ORAL DAILY
Qty: 90 TABLET | Refills: 3 | Status: SHIPPED | OUTPATIENT
Start: 2021-07-15 | End: 2022-09-16

## 2021-07-15 RX ORDER — LISINOPRIL 10 MG/1
10 TABLET ORAL DAILY
Qty: 90 TABLET | Refills: 3 | Status: SHIPPED | OUTPATIENT
Start: 2021-07-15 | End: 2022-09-16

## 2021-07-15 RX ORDER — METFORMIN HCL 500 MG
1000 TABLET, EXTENDED RELEASE 24 HR ORAL 2 TIMES DAILY WITH MEALS
Qty: 360 TABLET | Refills: 3 | Status: SHIPPED | OUTPATIENT
Start: 2021-07-15 | End: 2022-06-22

## 2021-07-15 ASSESSMENT — MIFFLIN-ST. JEOR: SCORE: 1873.23

## 2021-07-15 NOTE — PROGRESS NOTES
ASSESSMENT:   ASSESSMENT/PLAN:                                                    Lifestyle recommendations:continue to exercise on a regular basis  good job on losing weight!  keep working on losing weight (ideal BMI-body mass index is <25)  Diabetic recommendations:-return for follow-up visit in 6 month(s)    (E11.21) Type 2 diabetes mellitus with diabetic nephropathy, without long-term current use of insulin (H)  (primary encounter diagnosis)  Comment: doing well.  metformin may be contributing to stool changes.   Plan: Hemoglobin A1c, glipiZIDE (GLUCOTROL XL) 10 MG         24 hr tablet, metFORMIN (GLUCOPHAGE-XR) 500 MG         24 hr tablet        Switch to the extended release metformin, two 500mg pills twice daily.   If continuing diarrhea on this medication, try stopping metformin for 10-14 days to see if it resolves.  If so, we can try alternative medication or lower dose.     Add 81mg aspirin daily for preventing heart attacks. .     (N18.1) CKD (chronic kidney disease) stage 1, GFR 90 ml/min or greater  Comment:   Plan: lisinopril (ZESTRIL) 10 MG tablet        REfills.     (S69.92XA) Injury of left wrist, initial encounter  Comment: contusion (bruise).  I doubt bone fracture.   Plan: We discussed option of wrist xray today.  We can do if not improving.     (M54.5,  G89.29) Chronic left-sided low back pain without sciatica  Comment: intermittent.   Plan: This is muscular-ligamentous pain.  Stretches can help.    We can do physical therapy if bothering you more.     (S89.90XA) Injury of calf  Comment: I thin some minor tearing of muscle.  Good strength and improving at thie time.   Plan: No treatment needed.     (Z12.11) Special screening for malignant neoplasms, colon  Comment:   Plan: Adult Gastro Ref - Procedure Only        Schedule an appointment for colonoscopies or gastroscopies with surgery scheduling.  Call [phone]  to schedule the procedures.     (E78.5) Hyperlipidemia LDL goal <100  Comment:  needs refills  Plan: atorvastatin (LIPITOR) 10 MG tablet        No change in current treatment plan.  Refills.        Diabetes complications/Comorbid conditions:  Nephropathy:ckd    Retinopathy: no  Neuropathy: no  Vascular disease: no  High/low problems: no  Tobacco use: no    Treatments:  Statin: atorvastatin   Exercise: softball, golf, yard work.   ACE: lisinopril   ASA: no    Consider pneumonia vaccine.      We discussed getting a COVID-19 virus vaccine.   I do recommend getting a COVID-19 vaccine.  The vaccine is very effective in preventing COVID-19 virus illness and the more serious complications including death.  It has emergency FDA approval.   We know getting the virus can be deadly or cause long term health problems.   It has been given to many millions of people across the world.   There is a very low chance of significant side effects.   Serious allergic reactions in roughly one in a million.     Three good reasons to get the vaccine;  1.  It is very good at preventing getting the COVID-19 virus illness.   2.  It is very good at preventing serious illness and death from the virus.   3.  For the good of our neighbors and society, if we all get the vaccine, there is less chance it can spread and save lives.          Roby Ho is a 51 year old who presents for the following health issues  Chief Complaint   Patient presents with     Diabetes     Back Pain     Musculoskeletal Problem        Diabetes Follow-up      How often are you checking your blood sugar? Several 2-3 a day    Glucometer range within the past month=105-170.      Diabetes medications currently taking: metformin 1000mg BID, glipizide 10mg daily.   Lab Results   Component Value Date    A1C 6.5 07/15/2021    A1C 7.4 11/23/2020       What concerns do you have today about your diabetes? None     Do you have any of these symptoms? (Select all that apply)  No numbness or tingling in feet.  No redness, sores or blisters on feet.  No  complaints of excessive thirst.  No reports of blurry vision.  No significant changes to weight.    Have you had a diabetic eye exam in the last 12 months? No      Hyperlipidemia Follow-Up      Are you regularly taking any medication or supplement to lower your cholesterol?   Yes- Lipitor    Are you having muscle aches or other side effects that you think could be caused by your cholesterol lowering medication?  No    Hypertension Follow-up      Do you check your blood pressure regularly outside of the clinic? Yes     Are you following a low salt diet? No    Are your blood pressures ever more than 140 on the top number (systolic) OR more   than 90 on the bottom number (diastolic), for example 140/90? No    BP Readings from Last 2 Encounters:   07/15/21 120/70   11/23/20 114/80     Hemoglobin A1C (%)   Date Value   07/15/2021 6.5 (H)   11/23/2020 7.4 (H)   06/12/2019 7.6 (H)     LDL Cholesterol Calculated (mg/dL)   Date Value   11/23/2020 56   06/12/2019 52         How many servings of fruits and vegetables do you eat daily?  1-2    On average, how many sweetened beverages do you drink each day (Examples: soda, juice, sweet tea, etc.  Do NOT count diet or artificially sweetened beverages)?   0    How many days per week do you exercise enough to make your heart beat faster? Playing ball    How many minutes a day do you exercise enough to make your heart beat faster?     How many days per week do you miss taking your medication? 0      Back Pain  Onset/Duration: ? Could be up to 6 months ago  Description:   Location of pain: left lower  Character of pain: dull ache and intermittent  Pain radiation: none  New numbness or weakness in legs, not attributed to pain: no   Intensity: Currently 0/10, At its worst 3-4/10  Progression of Symptoms: intermittent  History:   Specific cause: none  Pain interferes with job: no  History of back problems: no prior back problems  Any previous MRI or X-rays: None  Sees a specialist for  back pain: No  Alleviating factors:   Improved by: sitting    Precipitating factors:  Worsened by: walking  Therapies tried and outcome: none    Dull pain low back for months.  INtermittent.  A little bit most days of the week.  Location: lower left back.  No radiation.   Does not hinder him in doing anything.  Aggravating factors: standing on concrete for a long time.     occasional sales.     Concern - Left wrist     Onset: 4 weeks ago  Description: hit with softball x 2 on radial left wrist. Now pain if touches radial styloid.  Pain shoots into thumb.   Intensity: mild, moderate  Progression of Symptoms:  intermittent  Accompanying Signs & Symptoms: Pain shots down into his hand. Has had numbness in arm, lasted for short period of time.  Previous history of similar problem: none  Precipitating factors:        Worsened by: touch    Alleviating factors:        Improved by:   Therapies tried and outcome:  none     Concern - Rt lower calf area  Onset: 3-4 weeks  Description: Was on the ball field stepped in a slight low area and felt a grind and a pop.  Intensity: mild  Progression of Symptoms:  intermittent  Accompanying Signs & Symptoms: none  Previous history of similar problem: none  Precipitating factors:        Worsened by: walking or running  Alleviating factors:        Improved by: rest  Therapies tried and outcome:  none   Onset of symptoms: 4 weeks.  Improving.      Last clinic visit:11/23/2020  Medication or other changes at last visit:no  Weight since last visit? Down 5#  Wt Readings from Last 5 Encounters:   07/15/21 97.5 kg (215 lb)   11/23/20 99.8 kg (220 lb)   07/08/20 97.5 kg (215 lb)   06/12/19 99.8 kg (220 lb)   10/11/18 100.8 kg (222 lb 3.2 oz)      History   Smoking Status     Never Smoker   Smokeless Tobacco     Never Used     Past medical history reviewed and updated    Patient Active Problem List    Diagnosis Date Noted     Essential hypertension with goal blood pressure less than 140/90  07/03/2019     Priority: Medium     CKD (chronic kidney disease) stage 1, GFR 90 ml/min or greater 12/14/2017     Priority: Medium     History of arthroplasty of left knee 12/29/2015     Priority: Medium     Type 2 diabetes mellitus with diabetic nephropathy (H) 06/22/2015     Priority: Medium     Diagnosis in June, 2013.        CARDIOVASCULAR SCREENING; LDL GOAL LESS THAN 160 10/31/2010     Priority: Medium     Hypersomnia with sleep apnea 08/29/2006     Priority: Medium     On CPAP January 2005.  6/24/2015:Uses CPAP every night.   11/23/2020:Uses CPAP nightly.          Other psoriasis 08/29/2006     Priority: Medium     Other tear of cartilage or meniscus of knee, current 08/29/2006     Priority: Medium     posterior medial on left       Current Outpatient Medications   Medication Sig Dispense Refill     atorvastatin (LIPITOR) 10 MG tablet TAKE 1 TABLET DAILY 90 tablet 0     cetirizine (ZYRTEC) 10 MG tablet Take 10 mg by mouth daily as needed for allergies (Summer Months (hayfever))  30 tablet 1     glipiZIDE (GLUCOTROL XL) 10 MG 24 hr tablet Take 1 tablet (10 mg) by mouth daily 90 tablet 3     lisinopril (ZESTRIL) 10 MG tablet Take 1 tablet (10 mg) by mouth daily 90 tablet 3     metFORMIN (GLUCOPHAGE) 1000 MG tablet TAKE 1 TABLET TWICE A DAY WITH MEALS 180 tablet 0     metroNIDAZOLE (METROCREAM) 0.75 % external cream Apply twice daily to face. 45 g 0     ACE/ARB NOT PRESCRIBED, INTENTIONAL, 1 each once ACE & ARB not prescribed due to Other: No CKD or hypertension       blood glucose (NO BRAND SPECIFIED) lancets standard Use to test blood sugar 1 times daily or as directed. 100 each 11     blood glucose monitoring (NO BRAND SPECIFIED) meter device kit Use to test blood sugar 1 times daily or as directed. 1 kit 0     blood glucose monitoring (NO BRAND SPECIFIED) test strip Use to test blood sugars 1 times daily or as directed 100 strip 11     calcipotriene (DOVONOX) 0.005 % external ointment Apply daily to affected  areas on body in the evening. 120 g 0     clobetasol (TEMOVATE) 0.05 % external ointment Apply topically daily Apply to Elbows, Legs, and Knees for Psoriasis in the morning. 120 g 0     EPINEPHrine (ANY BX GENERIC EQUIV) 0.3 MG/0.3ML injection 2-pack Inject 0.3 mLs (0.3 mg) into the muscle once as needed for anaphylaxis 2 each 0     ROS:General: No change in weight, sleep or appetite.  Normal energy.  No fever or chills  Resp: No coughing, wheezing or shortness of breath  CV: No chest pains or palpitations  GI: POSITIVE for:, change in bowel habits, stools more often and narrow caliber in the the past 1 and 1/2 weeks.  5 stools a day.  Was having 2-3 stools a day.   : No urinary frequency or dysuria, bladder or kidney problems  Musculoskeletal: see above   Psychiatric: No problems with anxiety, depression or mental health    OBJECTIVE:Blood pressure 120/70, pulse 60, temperature 98  F (36.7  C), temperature source Tympanic, resp. rate 16, height 1.829 m (6'), weight 97.5 kg (215 lb). BMI=Body mass index is 29.16 kg/m .  GENERAL APPEARANCE ADULT: Alert, no acute distress  NECK: No adenopathy,masses or thyromegaly  RESP: lungs clear to auscultation   CV: normal rate, regular rhythm, no murmur or gallop  ABDOMEN: soft, no organomegaly, masses or tenderness  MS: extremities normal, no peripheral edema  back exam: normal posture, shoulders, inferior scapular borders and hips even and symmetrical, full ROM without pain, heel and toe walk normal   wrist exam normal wrist appearance and range of motion.  He has some tenderness just proximal to the radial styloid.  He has no pain with resisted thumb movement and negative Finkelstein's test.  Foot exam:no exam

## 2021-07-15 NOTE — PATIENT INSTRUCTIONS
If you have not heard from the scheduling office within 2 business days, please call [phone]. Colonoscopy    ASSESSMENT:   ASSESSMENT/PLAN:                                                    Lifestyle recommendations:continue to exercise on a regular basis  good job on losing weight!  keep working on losing weight (ideal BMI-body mass index is <25)  Diabetic recommendations:-return for follow-up visit in 6 month(s)    (E11.21) Type 2 diabetes mellitus with diabetic nephropathy, without long-term current use of insulin (H)  (primary encounter diagnosis)  Comment: doing well.  metformin may be contributing to stool changes.   Plan: Hemoglobin A1c, glipiZIDE (GLUCOTROL XL) 10 MG         24 hr tablet, metFORMIN (GLUCOPHAGE-XR) 500 MG         24 hr tablet        Switch to the extended release metformin, two 500mg pills twice daily.   If continuing diarrhea on this medication, try stopping metformin for 10-14 days to see if it resolves.  If so, we can try alternative medication or lower dose.     Add 81mg aspirin daily for preventing heart attacks. .     (N18.1) CKD (chronic kidney disease) stage 1, GFR 90 ml/min or greater  Comment:   Plan: lisinopril (ZESTRIL) 10 MG tablet        REfills.     (S69.92XA) Injury of left wrist, initial encounter  Comment: contusion (bruise).  I doubt bone fracture.   Plan: We discussed option of wrist xray today.  We can do if not improving.     (M54.5,  G89.29) Chronic left-sided low back pain without sciatica  Comment: intermittent.   Plan: This is muscular-ligamentous pain.  Stretches can help.    We can do physical therapy if bothering you more.     (S89.90XA) Injury of calf  Comment: I thin some minor tearing of muscle.  Good strength and improving at thie time.   Plan: No treatment needed.     (Z12.11) Special screening for malignant neoplasms, colon  Comment:   Plan: Adult Gastro Ref - Procedure Only        Schedule an appointment for colonoscopies or gastroscopies with surgery  scheduling.  Call [phone]  to schedule the procedures.     (M78.5) Hyperlipidemia LDL goal <100  Comment: needs refills  Plan: atorvastatin (LIPITOR) 10 MG tablet        No change in current treatment plan.  Refills.        Diabetes complications/Comorbid conditions:  Nephropathy:ckd    Retinopathy: no  Neuropathy: no  Vascular disease: no  High/low problems: no  Tobacco use: no    Treatments:  Statin: atorvastatin   Exercise: softball, golf, yard work.   ACE: lisinopril   ASA: no    Consider pneumonia vaccine.      We discussed getting a COVID-19 virus vaccine.   I do recommend getting a COVID-19 vaccine.  The vaccine is very effective in preventing COVID-19 virus illness and the more serious complications including death.  It has emergency FDA approval.   We know getting the virus can be deadly or cause long term health problems.   It has been given to many millions of people across the world.   There is a very low chance of significant side effects.   Serious allergic reactions in roughly one in a million.     Three good reasons to get the vaccine;  1.  It is very good at preventing getting the COVID-19 virus illness.   2.  It is very good at preventing serious illness and death from the virus.   3.  For the good of our neighbors and society, if we all get the vaccine, there is less chance it can spread and save lives.

## 2021-07-17 DIAGNOSIS — Z11.59 ENCOUNTER FOR SCREENING FOR OTHER VIRAL DISEASES: ICD-10-CM

## 2021-07-30 DIAGNOSIS — L40.8 OTHER PSORIASIS: ICD-10-CM

## 2021-07-30 RX ORDER — CLOBETASOL PROPIONATE 0.5 MG/G
OINTMENT TOPICAL DAILY
Qty: 30 G | Refills: 0 | Status: CANCELLED | OUTPATIENT
Start: 2021-07-30

## 2021-07-30 NOTE — TELEPHONE ENCOUNTER
Requested Prescriptions   Pending Prescriptions Disp Refills     clobetasol (TEMOVATE) 0.05 % external ointment 120 g 0     Sig: Apply topically daily Apply to Elbows, Legs, and Knees for Psoriasis in the morning.       There is no refill protocol information for this order        Last office visit: Visit date not found with prescribing provider:  ALYCE Deluca   Future Office Visit:   Next 5 appointments (look out 90 days)    Aug 23, 2021  3:00 PM  Pre-procedure Covid with [facility] COVID LAB  [facility] ([facility] ) [address] [place] [id number]  [phone]               Denise Behrendt  Specialty CSS

## 2021-08-04 NOTE — TELEPHONE ENCOUNTER
Has not called back or scheduled Derm appt.  I did call pharmacy to let them know patient needs to be seen if he calls them.     India Rodney RN

## 2021-08-11 ENCOUNTER — ANESTHESIA EVENT (OUTPATIENT)
Dept: GASTROENTEROLOGY | Facility: CLINIC | Age: 51
End: 2021-08-11
Payer: COMMERCIAL

## 2021-08-11 NOTE — ANESTHESIA PREPROCEDURE EVALUATION
Anesthesia Pre-Procedure Evaluation    Patient: Arvin Ta   MRN: [id number] : 1970        Preoperative Diagnosis: Special screening for malignant neoplasm of colon [Z12.11]   Procedure : Procedure(s):  COLONOSCOPY     Past Medical History:   Diagnosis Date     Other psoriasis      Sleep apnea      Type 2 diabetes mellitus without complications (H)     Diabetes mellitus      Past Surgical History:   Procedure Laterality Date     ARTHROPLASTY KNEE UNICOMPARTMENT Left 2015    Procedure: ARTHROPLASTY KNEE UNICOMPARTMENT;  Surgeon: Lg Meyers MD;  Location: [facility]     ORTHOPEDIC SURGERY      Knee     SOFT TISSUE SURGERY      Shoulder     SOFT TISSUE SURGERY      Elbow     SURGICAL HISTORY OF -       (R) Shoulder     SURGICAL HISTORY OF -    approx    Pins in elbow left      No Known Allergies   Social History     Tobacco Use     Smoking status: Never Smoker     Smokeless tobacco: Never Used   Substance Use Topics     Alcohol use: Yes     Comment: occl      Wt Readings from Last 1 Encounters:   07/15/21 97.5 kg (215 lb)        Anesthesia Evaluation            ROS/MED HX  ENT/Pulmonary:     (+) sleep apnea, DIOGENES risk factors, hypertension, obese,     Neurologic:       Cardiovascular:     (+) Dyslipidemia hypertension-----    METS/Exercise Tolerance:     Hematologic:       Musculoskeletal: Comment: TKA      GI/Hepatic:       Renal/Genitourinary:     (+) renal disease,     Endo:     (+) type II DM, Diabetic complications: nephropathy.     Psychiatric/Substance Use:       Infectious Disease:       Malignancy:       Other: Comment: psoriasis              OUTSIDE LABS:  CBC:   Lab Results   Component Value Date    WBC 6.2 2015    HGB 15.0 2015    HCT 43.6 2015     2015     BMP:   Lab Results   Component Value Date     2020     (L) 2019    POTASSIUM 4.1 2020    POTASSIUM 4.2 2019    CHLORIDE 102 2020    CHLORIDE  102 06/12/2019    CO2 28 11/23/2020    CO2 26 06/12/2019    BUN 13 11/23/2020    BUN 11 06/12/2019    CR 0.79 11/23/2020    CR 0.93 06/12/2019     (H) 11/23/2020     (H) 06/12/2019     COAGS: No results found for: PTT, INR, FIBR  POC:   Lab Results   Component Value Date     (H) 12/30/2015     HEPATIC:   Lab Results   Component Value Date    ALBUMIN 4.2 12/22/2015    PROTTOTAL 7.3 12/22/2015    ALT 65 12/22/2015    AST 23 12/22/2015    ALKPHOS 93 12/22/2015    BILITOTAL 0.6 12/22/2015     OTHER:   Lab Results   Component Value Date    A1C 6.5 (H) 07/15/2021    KRISHNA 9.6 11/23/2020    TSH 2.20 06/12/2019    SED 2 03/22/2004       Anesthesia Plan    ASA Status:  3   NPO Status:  NPO Appropriate    Anesthesia Type: General.   Induction: Propofol.   Maintenance: TIVA.        Consents            Postoperative Care            Comments:                SHY Dunn CRNA

## 2021-08-23 ENCOUNTER — LAB (OUTPATIENT)
Dept: LAB | Facility: CLINIC | Age: 51
End: 2021-08-23
Payer: COMMERCIAL

## 2021-08-23 DIAGNOSIS — Z11.59 ENCOUNTER FOR SCREENING FOR OTHER VIRAL DISEASES: ICD-10-CM

## 2021-08-23 PROCEDURE — U0005 INFEC AGEN DETEC AMPLI PROBE: HCPCS

## 2021-08-23 PROCEDURE — U0003 INFECTIOUS AGENT DETECTION BY NUCLEIC ACID (DNA OR RNA); SEVERE ACUTE RESPIRATORY SYNDROME CORONAVIRUS 2 (SARS-COV-2) (CORONAVIRUS DISEASE [COVID-19]), AMPLIFIED PROBE TECHNIQUE, MAKING USE OF HIGH THROUGHPUT TECHNOLOGIES AS DESCRIBED BY CMS-2020-01-R: HCPCS

## 2021-08-24 LAB — SARS-COV-2 RNA RESP QL NAA+PROBE: NEGATIVE

## 2021-08-25 ENCOUNTER — HOSPITAL ENCOUNTER (OUTPATIENT)
Facility: CLINIC | Age: 51
Discharge: HOME | End: 2021-08-25
Attending: SURGERY | Admitting: SURGERY
Payer: COMMERCIAL

## 2021-08-25 ENCOUNTER — ANESTHESIA (OUTPATIENT)
Dept: GASTROENTEROLOGY | Facility: CLINIC | Age: 51
End: 2021-08-25
Payer: COMMERCIAL

## 2021-08-25 VITALS
SYSTOLIC BLOOD PRESSURE: 127 MMHG | BODY MASS INDEX: 29.12 KG/M2 | WEIGHT: 215 LBS | RESPIRATION RATE: 18 BRPM | DIASTOLIC BLOOD PRESSURE: 98 MMHG | HEART RATE: 69 BPM | OXYGEN SATURATION: 98 % | TEMPERATURE: 97.9 F | HEIGHT: 72 IN

## 2021-08-25 LAB
COLONOSCOPY: NORMAL
GLUCOSE BLDC GLUCOMTR-MCNC: 222 MG/DL (ref 70–99)

## 2021-08-25 PROCEDURE — 45385 COLONOSCOPY W/LESION REMOVAL: CPT | Mod: PT | Performed by: SURGERY

## 2021-08-25 PROCEDURE — 82947 ASSAY GLUCOSE BLOOD QUANT: CPT

## 2021-08-25 PROCEDURE — 88305 TISSUE EXAM BY PATHOLOGIST: CPT | Mod: TC | Performed by: SURGERY

## 2021-08-25 PROCEDURE — 82962 GLUCOSE BLOOD TEST: CPT

## 2021-08-25 PROCEDURE — 45380 COLONOSCOPY AND BIOPSY: CPT | Mod: 59 | Performed by: SURGERY

## 2021-08-25 PROCEDURE — 250N000009 HC RX 250: Performed by: NURSE ANESTHETIST, CERTIFIED REGISTERED

## 2021-08-25 PROCEDURE — 45380 COLONOSCOPY AND BIOPSY: CPT | Mod: PT,XU | Performed by: SURGERY

## 2021-08-25 PROCEDURE — 250N000009 HC RX 250: Performed by: SURGERY

## 2021-08-25 PROCEDURE — 370N000017 HC ANESTHESIA TECHNICAL FEE, PER MIN: Performed by: SURGERY

## 2021-08-25 PROCEDURE — 45385 COLONOSCOPY W/LESION REMOVAL: CPT | Mod: PT

## 2021-08-25 PROCEDURE — 250N000011 HC RX IP 250 OP 636: Performed by: NURSE ANESTHETIST, CERTIFIED REGISTERED

## 2021-08-25 PROCEDURE — 258N000003 HC RX IP 258 OP 636: Performed by: SURGERY

## 2021-08-25 RX ORDER — FLUMAZENIL 0.1 MG/ML
0.2 INJECTION, SOLUTION INTRAVENOUS
Status: DISCONTINUED | OUTPATIENT
Start: 2021-08-25 | End: 2021-08-25 | Stop reason: HOSPADM

## 2021-08-25 RX ORDER — NALOXONE HYDROCHLORIDE 0.4 MG/ML
0.2 INJECTION, SOLUTION INTRAMUSCULAR; INTRAVENOUS; SUBCUTANEOUS
Status: DISCONTINUED | OUTPATIENT
Start: 2021-08-25 | End: 2021-08-25 | Stop reason: HOSPADM

## 2021-08-25 RX ORDER — ONDANSETRON 2 MG/ML
4 INJECTION INTRAMUSCULAR; INTRAVENOUS
Status: DISCONTINUED | OUTPATIENT
Start: 2021-08-25 | End: 2021-08-25 | Stop reason: HOSPADM

## 2021-08-25 RX ORDER — SODIUM CHLORIDE, SODIUM LACTATE, POTASSIUM CHLORIDE, CALCIUM CHLORIDE 600; 310; 30; 20 MG/100ML; MG/100ML; MG/100ML; MG/100ML
INJECTION, SOLUTION INTRAVENOUS CONTINUOUS
Status: DISCONTINUED | OUTPATIENT
Start: 2021-08-25 | End: 2021-08-25 | Stop reason: HOSPADM

## 2021-08-25 RX ORDER — LIDOCAINE 40 MG/G
CREAM TOPICAL
Status: DISCONTINUED | OUTPATIENT
Start: 2021-08-25 | End: 2021-08-25 | Stop reason: HOSPADM

## 2021-08-25 RX ORDER — PROPOFOL 10 MG/ML
INJECTION, EMULSION INTRAVENOUS PRN
Status: DISCONTINUED | OUTPATIENT
Start: 2021-08-25 | End: 2021-08-25

## 2021-08-25 RX ORDER — NALOXONE HYDROCHLORIDE 0.4 MG/ML
0.4 INJECTION, SOLUTION INTRAMUSCULAR; INTRAVENOUS; SUBCUTANEOUS
Status: DISCONTINUED | OUTPATIENT
Start: 2021-08-25 | End: 2021-08-25 | Stop reason: HOSPADM

## 2021-08-25 RX ORDER — LIDOCAINE HYDROCHLORIDE 10 MG/ML
INJECTION, SOLUTION EPIDURAL; INFILTRATION; INTRACAUDAL; PERINEURAL PRN
Status: DISCONTINUED | OUTPATIENT
Start: 2021-08-25 | End: 2021-08-25

## 2021-08-25 RX ORDER — PROPOFOL 10 MG/ML
INJECTION, EMULSION INTRAVENOUS CONTINUOUS PRN
Status: DISCONTINUED | OUTPATIENT
Start: 2021-08-25 | End: 2021-08-25

## 2021-08-25 RX ADMIN — PROPOFOL 100 MG: 10 INJECTION, EMULSION INTRAVENOUS at 09:31

## 2021-08-25 RX ADMIN — PROPOFOL 200 MCG/KG/MIN: 10 INJECTION, EMULSION INTRAVENOUS at 09:31

## 2021-08-25 RX ADMIN — LIDOCAINE HYDROCHLORIDE 5 ML: 10 INJECTION, SOLUTION EPIDURAL; INFILTRATION; INTRACAUDAL; PERINEURAL at 09:31

## 2021-08-25 RX ADMIN — LIDOCAINE HYDROCHLORIDE 0.1 ML: 10 INJECTION, SOLUTION EPIDURAL; INFILTRATION; INTRACAUDAL; PERINEURAL at 09:03

## 2021-08-25 RX ADMIN — SODIUM CHLORIDE, POTASSIUM CHLORIDE, SODIUM LACTATE AND CALCIUM CHLORIDE: 600; 310; 30; 20 INJECTION, SOLUTION INTRAVENOUS at 09:03

## 2021-08-25 ASSESSMENT — MIFFLIN-ST. JEOR: SCORE: 1873.23

## 2021-08-25 NOTE — ANESTHESIA CARE TRANSFER NOTE
Patient: Arvin Ta    Procedure(s):  COLONOSCOPY, WITH POLYPECTOMY AND BIOPSY    Diagnosis: Special screening for malignant neoplasm of colon [Z12.11]  Diagnosis Additional Information: No value filed.    Anesthesia Type:   General     Note:    Oropharynx: spontaneously breathing  Level of Consciousness: drowsy  Oxygen Supplementation: nasal cannula  Level of Supplemental Oxygen (L/min / FiO2): 3  Independent Airway: airway patency satisfactory and stable  Dentition: dentition unchanged  Vital Signs Stable: post-procedure vital signs reviewed and stable    Patient transferred to: Phase II    Handoff Report: Identifed the Patient, Identified the Reponsible Provider, Reviewed the pertinent medical history, Discussed the surgical course, Reviewed Intra-OP anesthesia mangement and issues during anesthesia, Set expectations for post-procedure period and Allowed opportunity for questions and acknowledgement of understanding      Vitals:  Vitals Value Taken Time   BP     Temp     Pulse     Resp     SpO2         Electronically Signed By: SHY Dunn CRNA  August 25, 2021  9:55 AM

## 2021-08-25 NOTE — H&P
51 year old year old male here for colonoscopy for screening.  This is patient's first colonoscopy.  Patient denies blood in stool or change in stool caliber.  He does note looser stools as of late.  No other changes.  No changes in meds. There is no known family history of colon cancer or polyps.    Patient Active Problem List   Diagnosis     Hypersomnia with sleep apnea     Other psoriasis     Other tear of cartilage or meniscus of knee, current     Hyperlipidemia LDL goal <100     Type 2 diabetes mellitus with diabetic nephropathy (H)     History of arthroplasty of left knee     CKD (chronic kidney disease) stage 1, GFR 90 ml/min or greater     Essential hypertension with goal blood pressure less than 140/90       Past Medical History:   Diagnosis Date     Hypertension      Other psoriasis      Sleep apnea      Type 2 diabetes mellitus without complications (H)     Diabetes mellitus       Past Surgical History:   Procedure Laterality Date     ARTHROPLASTY KNEE UNICOMPARTMENT Left 12/29/2015    Procedure: ARTHROPLASTY KNEE UNICOMPARTMENT;  Surgeon: Lg Meyers MD;  Location: [facility]     ORTHOPEDIC SURGERY  2008    Knee     SOFT TISSUE SURGERY  1995    Shoulder     SOFT TISSUE SURGERY  1986    Elbow     SURGICAL HISTORY OF -   1994    (R) Shoulder     SURGICAL HISTORY OF -   1987 approx    Pins in elbow left       Family History   Problem Relation Age of Onset     Diabetes Mother         type 2     Cancer Father 72        Hodgkins Lymphoma     Heart Disease Father         Stent at age 67     Prostate Cancer No family hx of      Colon Cancer No family hx of        No current outpatient medications on file.       No Known Allergies    Pt reports that he has never smoked. He has never used smokeless tobacco. He reports current alcohol use. He reports that he does not use drugs.    Exam:  /82 (BP Location: Left arm)   Pulse 78   Temp 97.9  F (36.6  C) (Oral)   Ht 1.829 m (6')   Wt 97.5 kg (215 lb)    SpO2 99%   BMI 29.16 kg/m      Awake, Alert OX3  Lungs - CTA bilaterally  CV - RRR, no murmurs, distal pulses intact  Abd - soft, non-distended, non-tender, +BS  Extr - No cyanosis or edema    A/P 51 year old year old male in need of colonoscopy for screening. Risks, benefits, alternatives, and complications were discussed including the possibility of perforation, bleeding, missed lesion and the patient agreed to proceed    Lefty Alvarez DO on 8/25/2021 at 9:02 AM

## 2021-08-27 LAB
PATH REPORT.COMMENTS IMP SPEC: NORMAL
PATH REPORT.COMMENTS IMP SPEC: NORMAL
PATH REPORT.FINAL DX SPEC: NORMAL
PATH REPORT.GROSS SPEC: NORMAL
PATH REPORT.MICROSCOPIC SPEC OTHER STN: NORMAL
PATH REPORT.RELEVANT HX SPEC: NORMAL
PHOTO IMAGE: NORMAL

## 2021-08-27 PROCEDURE — 88305 TISSUE EXAM BY PATHOLOGIST: CPT | Mod: 26 | Performed by: PATHOLOGY

## 2021-09-19 ENCOUNTER — HEALTH MAINTENANCE LETTER (OUTPATIENT)
Age: 51
End: 2021-09-19

## 2021-12-07 DIAGNOSIS — E11.21 TYPE 2 DIABETES MELLITUS WITH DIABETIC NEPHROPATHY, WITHOUT LONG-TERM CURRENT USE OF INSULIN (H): ICD-10-CM

## 2021-12-07 DIAGNOSIS — N18.1 CKD (CHRONIC KIDNEY DISEASE) STAGE 1, GFR 90 ML/MIN OR GREATER: ICD-10-CM

## 2021-12-07 RX ORDER — LISINOPRIL 10 MG/1
10 TABLET ORAL DAILY
Qty: 90 TABLET | Refills: 3 | OUTPATIENT
Start: 2021-12-07

## 2021-12-07 RX ORDER — GLIPIZIDE 10 MG/1
TABLET, FILM COATED, EXTENDED RELEASE ORAL
Qty: 90 TABLET | Refills: 3 | OUTPATIENT
Start: 2021-12-07

## 2021-12-07 NOTE — TELEPHONE ENCOUNTER
Pt calling for 2 wk supply of lisinopril and glipizide until mail order meds arrive.   Last home BP reading 135/89.  Aware due for DM F/U appt mid Jan 2022.   Lab Results   Component Value Date    A1C 6.5 07/15/2021    A1C 7.4 11/23/2020    A1C 7.6 06/12/2019    A1C 6.3 12/13/2017    A1C 6.7 02/24/2017    A1C 7.8 12/22/2015     .  Component      Latest Ref Rng & Units 11/23/2020   Sodium      133 - 144 mmol/L 135   Potassium      3.4 - 5.3 mmol/L 4.1   Chloride      94 - 109 mmol/L 102   Carbon Dioxide      20 - 32 mmol/L 28   Anion Gap      3 - 14 mmol/L 5   Glucose      70 - 99 mg/dL 135 (H)   Urea Nitrogen      7 - 30 mg/dL 13   Creatinine      0.66 - 1.25 mg/dL 0.79   GFR Estimate      >60 mL/min/1.73:m2 >90   GFR Estimate If Black      >60 mL/min/1.73:m2 >90   Calcium      8.5 - 10.1 mg/dL 9.6     Rx refills qty 14 each phoned to [facility] pharm to bridge until mail order supply arrives.  RICARDO Chacon RN

## 2022-01-09 ENCOUNTER — HEALTH MAINTENANCE LETTER (OUTPATIENT)
Age: 52
End: 2022-01-09

## 2022-03-06 ENCOUNTER — HEALTH MAINTENANCE LETTER (OUTPATIENT)
Age: 52
End: 2022-03-06

## 2022-04-04 DIAGNOSIS — L40.8 OTHER PSORIASIS: ICD-10-CM

## 2022-04-04 NOTE — TELEPHONE ENCOUNTER
[facility] Call Center    Phone Message    May a detailed message be left on voicemail: yes     Reason for Call: Medication Refill Request    Has the patient contacted the pharmacy for the refill? Yes   Name of medication being requested: clobetasol (TEMOVATE) 0.05 % external ointment  Provider who prescribed the medication: Sandrine HUNTER   Pharmacy: [facility] [address] [place] [address]  Date medication is needed: ASAP    Pt is leaving town tomorrow and would like to know if he is able to get Rx today. I reminded Pt it could take up to 72 hours. Pt schedule for 06/21 and would like a call back ASAP to discuss. Thanks      Action Taken: Message routed to:  [facility] Derm    Travel Screening: Not Applicable

## 2022-04-05 RX ORDER — CLOBETASOL PROPIONATE 0.5 MG/G
OINTMENT TOPICAL DAILY
Qty: 30 G | Refills: 0 | Status: SHIPPED | OUTPATIENT
Start: 2022-04-05 | End: 2022-06-21

## 2022-04-05 NOTE — TELEPHONE ENCOUNTER
Last seen 6-.  Was advised of need to be seen August 2021 and did not schedule Derm f/u appt.     Now scheduled 1 st available in June. 30 g refilled to cover until he can be seen. India Rodney RN

## 2022-06-21 ENCOUNTER — OFFICE VISIT (OUTPATIENT)
Dept: DERMATOLOGY | Facility: CLINIC | Age: 52
End: 2022-06-21
Payer: COMMERCIAL

## 2022-06-21 DIAGNOSIS — L40.8 OTHER PSORIASIS: ICD-10-CM

## 2022-06-21 DIAGNOSIS — L71.9 ACNE ROSACEA: ICD-10-CM

## 2022-06-21 DIAGNOSIS — E11.21 TYPE 2 DIABETES MELLITUS WITH DIABETIC NEPHROPATHY, WITHOUT LONG-TERM CURRENT USE OF INSULIN (H): ICD-10-CM

## 2022-06-21 DIAGNOSIS — D22.9 MULTIPLE BENIGN NEVI: Primary | ICD-10-CM

## 2022-06-21 PROCEDURE — 99213 OFFICE O/P EST LOW 20 MIN: CPT | Performed by: PHYSICIAN ASSISTANT

## 2022-06-21 RX ORDER — CLOBETASOL PROPIONATE 0.5 MG/G
OINTMENT TOPICAL DAILY
Qty: 120 G | Refills: 6 | Status: SHIPPED | OUTPATIENT
Start: 2022-06-21 | End: 2023-11-30

## 2022-06-21 RX ORDER — CALCIPOTRIENE 50 UG/G
OINTMENT TOPICAL
Qty: 120 G | Refills: 5 | Status: SHIPPED | OUTPATIENT
Start: 2022-06-21 | End: 2023-11-30

## 2022-06-21 ASSESSMENT — PAIN SCALES - GENERAL: PAINLEVEL: NO PAIN (0)

## 2022-06-21 NOTE — LETTER
6/21/2022         RE: Arvin Ta  [address] [id number]        Dear Colleague,    Thank you for referring your patient, Arvin Ta, to the [facility]. Please see a copy of my visit note below.    Arvin Ta is an extremely pleasant 52 year old year old male patient here today for recheck rosacea and psoriasis. He notes doing well when he uses topical. He needs refills. He is not interested in systemic medications. Patient has no other skin complaints today.  Remainder of the HPI, Meds, PMH, Allergies, FH, and SH was reviewed in chart.   Past Medical History:   Diagnosis Date     Hypertension      Other psoriasis      Sleep apnea      Type 2 diabetes mellitus without complications (H)     Diabetes mellitus       Past Surgical History:   Procedure Laterality Date     ARTHROPLASTY KNEE UNICOMPARTMENT Left 12/29/2015    Procedure: ARTHROPLASTY KNEE UNICOMPARTMENT;  Surgeon: Lg Meyers MD;  Location:  OR     COLONOSCOPY N/A 8/25/2021    Procedure: COLONOSCOPY, WITH POLYPECTOMY AND BIOPSY;  Surgeon: Lefty Alvarez DO;  Location: [facility]     ORTHOPEDIC SURGERY  2008    Knee     SOFT TISSUE SURGERY  1995    Shoulder     SOFT TISSUE SURGERY  1986    Elbow     SURGICAL HISTORY OF -   1994    (R) Shoulder     SURGICAL HISTORY OF -   1987 approx    Pins in elbow left        Family History   Problem Relation Age of Onset     Diabetes Mother         type 2     Cancer Father 72        Hodgkins Lymphoma     Heart Disease Father         Stent at age 67     Prostate Cancer No family hx of      Colon Cancer No family hx of        Social History     Socioeconomic History     Marital status:      Spouse name: Ramila     Number of children: Not on file     Years of education: 16     Highest education level: Not on file   Occupational History     Employer: [employer]   Tobacco Use     Smoking status: Never Smoker     Smokeless  tobacco: Never Used   Vaping Use     Vaping Use: Never used   Substance and Sexual Activity     Alcohol use: Yes     Comment: occl     Drug use: No     Sexual activity: Yes     Partners: Female   Other Topics Concern      Service No     Blood Transfusions No     Caffeine Concern No     Occupational Exposure No     Hobby Hazards No     Sleep Concern Yes     Comment: c pap- sleep apnea     Stress Concern No     Weight Concern No     Special Diet No     Back Care No     Exercise No     Bike Helmet No     Seat Belt Yes     Self-Exams No     Parent/sibling w/ CABG, MI or angioplasty before 65F 55M? Not Asked   Social History Narrative     Not on file     Social Determinants of Health     Financial Resource Strain: Not on file   Food Insecurity: Not on file   Transportation Needs: Not on file   Physical Activity: Not on file   Stress: Not on file   Social Connections: Not on file   Intimate Partner Violence: Not on file   Housing Stability: Not on file       Outpatient Encounter Medications as of 6/21/2022   Medication Sig Dispense Refill     ACE/ARB NOT PRESCRIBED, INTENTIONAL, 1 each once ACE & ARB not prescribed due to Other: No CKD or hypertension       atorvastatin (LIPITOR) 10 MG tablet Take 1 tablet (10 mg) by mouth daily 90 tablet 3     blood glucose (NO BRAND SPECIFIED) lancets standard Use to test blood sugar 1 times daily or as directed. 100 each 11     blood glucose monitoring (NO BRAND SPECIFIED) meter device kit Use to test blood sugar 1 times daily or as directed. 1 kit 0     blood glucose monitoring (NO BRAND SPECIFIED) test strip Use to test blood sugars 1 times daily or as directed 100 strip 11     calcipotriene (DOVONOX) 0.005 % external ointment Apply daily to affected areas on body in the evening. 120 g 5     cetirizine (ZYRTEC) 10 MG tablet Take 10 mg by mouth daily as needed for allergies (Summer Months (hayfever))  30 tablet 1     clobetasol (TEMOVATE) 0.05 % external ointment Apply  topically daily Apply to Elbows, Legs, and Knees for Psoriasis in the morning. 120 g 6     EPINEPHrine (ANY BX GENERIC EQUIV) 0.3 MG/0.3ML injection 2-pack Inject 0.3 mLs (0.3 mg) into the muscle once as needed for anaphylaxis 2 each 0     glipiZIDE (GLUCOTROL XL) 10 MG 24 hr tablet Take 1 tablet (10 mg) by mouth daily 90 tablet 3     lisinopril (ZESTRIL) 10 MG tablet Take 1 tablet (10 mg) by mouth daily 90 tablet 3     metFORMIN (GLUCOPHAGE-XR) 500 MG 24 hr tablet Take 2 tablets (1,000 mg) by mouth 2 times daily (with meals) 360 tablet 3     metroNIDAZOLE (METROCREAM) 0.75 % external cream Apply twice daily to face. 45 g 5     [DISCONTINUED] calcipotriene (DOVONOX) 0.005 % external ointment Apply daily to affected areas on body in the evening. 120 g 0     [DISCONTINUED] clobetasol (TEMOVATE) 0.05 % external ointment Apply topically daily Apply to Elbows, Legs, and Knees for Psoriasis in the morning. 30 g 0     [DISCONTINUED] metroNIDAZOLE (METROCREAM) 0.75 % external cream Apply twice daily to face. 45 g 0     No facility-administered encounter medications on file as of 6/21/2022.             O:   NAD, WDWN, Alert & Oriented, Mood & Affect wnl, Vitals stable   Here today alone   There were no vitals taken for this visit.   General appearance normal   Vitals stable   Alert, oriented and in no acute distress      Inflammatory papules, pustules on face   Thin psoriasiform plaques on arms, legs, small are on torso   Brown thin plaques with regular pigment network and borders x 2 on lower back     Eyes: Conjunctivae/lids:Normal     ENT: Lip: normal    MSK:Normal    Pulm: Breathing Normal    Neuro/Psych: Orientation:Alert and Orientedx3 ; Mood/Affect:normal   A/P:  1. Acne Rosacea  Restart metrocream twice daily to face.   2. Psoriasis   Alternate with clobetasol and dovonex.   Skin care regimen reviewed with patient: Eliminate harsh soaps, i.e. Dial, zest, irsih spring; Mild soaps such as Cetaphil or Dove sensitive  skin, avoid hot or cold showers, aggressive use of emollients including vanicream, cetaphil or cerave discussed with patient.    3. Benign appearing nevi on back  Discussed to watch for changes.     Return in one year or sooner if needed.       Again, thank you for allowing me to participate in the care of your patient.        Sincerely,        Margi Davies PA-C

## 2022-06-21 NOTE — PROGRESS NOTES
Arvin Ta is an extremely pleasant 52 year old year old male patient here today for recheck rosacea and psoriasis. He notes doing well when he uses topical. He needs refills. He is not interested in systemic medications. Patient has no other skin complaints today.  Remainder of the HPI, Meds, PMH, Allergies, FH, and SH was reviewed in chart.   Past Medical History:   Diagnosis Date     Hypertension      Other psoriasis      Sleep apnea      Type 2 diabetes mellitus without complications (H)     Diabetes mellitus       Past Surgical History:   Procedure Laterality Date     ARTHROPLASTY KNEE UNICOMPARTMENT Left 12/29/2015    Procedure: ARTHROPLASTY KNEE UNICOMPARTMENT;  Surgeon: Lg Meyers MD;  Location: [facility]     COLONOSCOPY N/A 8/25/2021    Procedure: COLONOSCOPY, WITH POLYPECTOMY AND BIOPSY;  Surgeon: Lefty Alvarez DO;  Location: [facility]     ORTHOPEDIC SURGERY  2008    Knee     SOFT TISSUE SURGERY  1995    Shoulder     SOFT TISSUE SURGERY  1986    Elbow     SURGICAL HISTORY OF -   1994    (R) Shoulder     SURGICAL HISTORY OF -   1987 approx    Pins in elbow left        Family History   Problem Relation Age of Onset     Diabetes Mother         type 2     Cancer Father 72        Hodgkins Lymphoma     Heart Disease Father         Stent at age 67     Prostate Cancer No family hx of      Colon Cancer No family hx of        Social History     Socioeconomic History     Marital status:      Spouse name: Ramila     Number of children: Not on file     Years of education: 16     Highest education level: Not on file   Occupational History     Employer: [employer]   Tobacco Use     Smoking status: Never Smoker     Smokeless tobacco: Never Used   Vaping Use     Vaping Use: Never used   Substance and Sexual Activity     Alcohol use: Yes     Comment: occl     Drug use: No     Sexual activity: Yes     Partners: Female   Other Topics Concern      Service No     Blood Transfusions No      Caffeine Concern No     Occupational Exposure No     Hobby Hazards No     Sleep Concern Yes     Comment: c pap- sleep apnea     Stress Concern No     Weight Concern No     Special Diet No     Back Care No     Exercise No     Bike Helmet No     Seat Belt Yes     Self-Exams No     Parent/sibling w/ CABG, MI or angioplasty before 65F 55M? Not Asked   Social History Narrative     Not on file     Social Determinants of Health     Financial Resource Strain: Not on file   Food Insecurity: Not on file   Transportation Needs: Not on file   Physical Activity: Not on file   Stress: Not on file   Social Connections: Not on file   Intimate Partner Violence: Not on file   Housing Stability: Not on file       Outpatient Encounter Medications as of 6/21/2022   Medication Sig Dispense Refill     ACE/ARB NOT PRESCRIBED, INTENTIONAL, 1 each once ACE & ARB not prescribed due to Other: No CKD or hypertension       atorvastatin (LIPITOR) 10 MG tablet Take 1 tablet (10 mg) by mouth daily 90 tablet 3     blood glucose (NO BRAND SPECIFIED) lancets standard Use to test blood sugar 1 times daily or as directed. 100 each 11     blood glucose monitoring (NO BRAND SPECIFIED) meter device kit Use to test blood sugar 1 times daily or as directed. 1 kit 0     blood glucose monitoring (NO BRAND SPECIFIED) test strip Use to test blood sugars 1 times daily or as directed 100 strip 11     calcipotriene (DOVONOX) 0.005 % external ointment Apply daily to affected areas on body in the evening. 120 g 5     cetirizine (ZYRTEC) 10 MG tablet Take 10 mg by mouth daily as needed for allergies (Summer Months (hayfever))  30 tablet 1     clobetasol (TEMOVATE) 0.05 % external ointment Apply topically daily Apply to Elbows, Legs, and Knees for Psoriasis in the morning. 120 g 6     EPINEPHrine (ANY BX GENERIC EQUIV) 0.3 MG/0.3ML injection 2-pack Inject 0.3 mLs (0.3 mg) into the muscle once as needed for anaphylaxis 2 each 0     glipiZIDE (GLUCOTROL XL) 10 MG 24  hr tablet Take 1 tablet (10 mg) by mouth daily 90 tablet 3     lisinopril (ZESTRIL) 10 MG tablet Take 1 tablet (10 mg) by mouth daily 90 tablet 3     metFORMIN (GLUCOPHAGE-XR) 500 MG 24 hr tablet Take 2 tablets (1,000 mg) by mouth 2 times daily (with meals) 360 tablet 3     metroNIDAZOLE (METROCREAM) 0.75 % external cream Apply twice daily to face. 45 g 5     [DISCONTINUED] calcipotriene (DOVONOX) 0.005 % external ointment Apply daily to affected areas on body in the evening. 120 g 0     [DISCONTINUED] clobetasol (TEMOVATE) 0.05 % external ointment Apply topically daily Apply to Elbows, Legs, and Knees for Psoriasis in the morning. 30 g 0     [DISCONTINUED] metroNIDAZOLE (METROCREAM) 0.75 % external cream Apply twice daily to face. 45 g 0     No facility-administered encounter medications on file as of 6/21/2022.             O:   NAD, WDWN, Alert & Oriented, Mood & Affect wnl, Vitals stable   Here today alone   There were no vitals taken for this visit.   General appearance normal   Vitals stable   Alert, oriented and in no acute distress      Inflammatory papules, pustules on face   Thin psoriasiform plaques on arms, legs, small are on torso   Brown thin plaques with regular pigment network and borders x 2 on lower back     Eyes: Conjunctivae/lids:Normal     ENT: Lip: normal    MSK:Normal    Pulm: Breathing Normal    Neuro/Psych: Orientation:Alert and Orientedx3 ; Mood/Affect:normal   A/P:  1. Acne Rosacea  Restart metrocream twice daily to face.   2. Psoriasis   Alternate with clobetasol and dovonex.   Skin care regimen reviewed with patient: Eliminate harsh soaps, i.e. Dial, zest, irsih spring; Mild soaps such as Cetaphil or Dove sensitive skin, avoid hot or cold showers, aggressive use of emollients including vanicream, cetaphil or cerave discussed with patient.    3. Benign appearing nevi on back  Discussed to watch for changes.     Return in one year or sooner if needed.

## 2022-06-22 RX ORDER — METFORMIN HCL 500 MG
TABLET, EXTENDED RELEASE 24 HR ORAL
Qty: 360 TABLET | Refills: 0 | Status: SHIPPED | OUTPATIENT
Start: 2022-06-22 | End: 2022-09-20

## 2022-06-22 NOTE — TELEPHONE ENCOUNTER
"Requested Prescriptions   Pending Prescriptions Disp Refills    metFORMIN (GLUCOPHAGE XR) 500 MG 24 hr tablet [Pharmacy Med Name: METFORMIN HCL ER TABS 500MG] 360 tablet 3     Sig: TAKE 2 TABLETS TWICE A DAY WITH MEALS        Biguanide Agents Failed - 6/21/2022 11:11 PM        Failed - Patient has documented A1c within the specified period of time.     If HgbA1C is 8 or greater, it needs to be on file within the past 3 months.  If less than 8, must be on file within the past 6 months.     Recent Labs   Lab Test 07/15/21  0938   A1C 6.5*             Failed - Patient's CR is NOT>1.4 OR Patient's EGFR is NOT<45 within past 12 mos.       Recent Labs   Lab Test 11/23/20  1045   GFRESTIMATED >90   GFRESTBLACK >90       Recent Labs   Lab Test 11/23/20  1045   CR 0.79             Failed - Recent (6 mo) or future (30 days) visit within the authorizing provider's specialty     Patient had office visit in the last 6 months or has a visit in the next 30 days with authorizing provider or within the authorizing provider's specialty.  See \"Patient Info\" tab in inbasket, or \"Choose Columns\" in Meds & Orders section of the refill encounter.            Passed - Patient is age 10 or older        Passed - Patient does NOT have a diagnosis of CHF.        Passed - Medication is active on med list              "

## 2022-08-24 DIAGNOSIS — E78.5 HYPERLIPIDEMIA LDL GOAL <100: ICD-10-CM

## 2022-08-25 RX ORDER — ATORVASTATIN CALCIUM 10 MG/1
TABLET, FILM COATED ORAL
Qty: 90 TABLET | Refills: 1 | Status: SHIPPED | OUTPATIENT
Start: 2022-08-25 | End: 2022-09-21

## 2022-08-25 NOTE — TELEPHONE ENCOUNTER
Routing refill request to provider for review/approval because:  Patient needs to be seen because it has been more than 1 year since last office visit.    Samuel Abad RN

## 2022-08-29 ENCOUNTER — APPOINTMENT (OUTPATIENT)
Dept: GENERAL RADIOLOGY | Facility: CLINIC | Age: 52
End: 2022-08-29
Attending: PHYSICIAN ASSISTANT
Payer: COMMERCIAL

## 2022-08-29 ENCOUNTER — HOSPITAL ENCOUNTER (EMERGENCY)
Facility: CLINIC | Age: 52
Discharge: HOME | End: 2022-08-29
Attending: PHYSICIAN ASSISTANT | Admitting: PHYSICIAN ASSISTANT
Payer: COMMERCIAL

## 2022-08-29 VITALS
WEIGHT: 214 LBS | HEART RATE: 65 BPM | DIASTOLIC BLOOD PRESSURE: 89 MMHG | HEIGHT: 72 IN | TEMPERATURE: 98.1 F | RESPIRATION RATE: 20 BRPM | SYSTOLIC BLOOD PRESSURE: 127 MMHG | OXYGEN SATURATION: 96 % | BODY MASS INDEX: 28.99 KG/M2

## 2022-08-29 DIAGNOSIS — M54.2 NECK PAIN: ICD-10-CM

## 2022-08-29 LAB
ALBUMIN SERPL-MCNC: 4.3 G/DL (ref 3.4–5)
ALP SERPL-CCNC: 88 U/L (ref 40–150)
ALT SERPL W P-5'-P-CCNC: 62 U/L (ref 0–70)
ANION GAP SERPL CALCULATED.3IONS-SCNC: 5 MMOL/L (ref 3–14)
AST SERPL W P-5'-P-CCNC: 27 U/L (ref 0–45)
BASOPHILS # BLD AUTO: 0 10E3/UL (ref 0–0.2)
BASOPHILS NFR BLD AUTO: 0 %
BILIRUB SERPL-MCNC: 0.6 MG/DL (ref 0.2–1.3)
BUN SERPL-MCNC: 10 MG/DL (ref 7–30)
CALCIUM SERPL-MCNC: 9.3 MG/DL (ref 8.5–10.1)
CHLORIDE BLD-SCNC: 106 MMOL/L (ref 94–109)
CO2 SERPL-SCNC: 28 MMOL/L (ref 20–32)
CREAT SERPL-MCNC: 0.66 MG/DL (ref 0.66–1.25)
EOSINOPHIL # BLD AUTO: 0.1 10E3/UL (ref 0–0.7)
EOSINOPHIL NFR BLD AUTO: 2 %
ERYTHROCYTE [DISTWIDTH] IN BLOOD BY AUTOMATED COUNT: 11.9 % (ref 10–15)
GFR SERPL CREATININE-BSD FRML MDRD: >90 ML/MIN/1.73M2
GLUCOSE BLD-MCNC: 145 MG/DL (ref 70–99)
HCT VFR BLD AUTO: 41.5 % (ref 40–53)
HGB BLD-MCNC: 14.5 G/DL (ref 13.3–17.7)
HOLD SPECIMEN: NORMAL
IMM GRANULOCYTES # BLD: 0 10E3/UL
IMM GRANULOCYTES NFR BLD: 0 %
LYMPHOCYTES # BLD AUTO: 1.6 10E3/UL (ref 0.8–5.3)
LYMPHOCYTES NFR BLD AUTO: 25 %
MCH RBC QN AUTO: 30.5 PG (ref 26.5–33)
MCHC RBC AUTO-ENTMCNC: 34.9 G/DL (ref 31.5–36.5)
MCV RBC AUTO: 87 FL (ref 78–100)
MONOCYTES # BLD AUTO: 0.6 10E3/UL (ref 0–1.3)
MONOCYTES NFR BLD AUTO: 9 %
NEUTROPHILS # BLD AUTO: 4 10E3/UL (ref 1.6–8.3)
NEUTROPHILS NFR BLD AUTO: 64 %
NRBC # BLD AUTO: 0 10E3/UL
NRBC BLD AUTO-RTO: 0 /100
PLATELET # BLD AUTO: 234 10E3/UL (ref 150–450)
POTASSIUM BLD-SCNC: 4 MMOL/L (ref 3.4–5.3)
PROT SERPL-MCNC: 7.5 G/DL (ref 6.8–8.8)
RBC # BLD AUTO: 4.76 10E6/UL (ref 4.4–5.9)
SODIUM SERPL-SCNC: 139 MMOL/L (ref 133–144)
TROPONIN I SERPL HS-MCNC: 4 NG/L
TSH SERPL DL<=0.005 MIU/L-ACNC: 1.36 MU/L (ref 0.4–4)
WBC # BLD AUTO: 6.2 10E3/UL (ref 4–11)

## 2022-08-29 PROCEDURE — 99285 EMERGENCY DEPT VISIT HI MDM: CPT | Mod: 25 | Performed by: PHYSICIAN ASSISTANT

## 2022-08-29 PROCEDURE — 93005 ELECTROCARDIOGRAM TRACING: CPT | Performed by: PHYSICIAN ASSISTANT

## 2022-08-29 PROCEDURE — 71046 X-RAY EXAM CHEST 2 VIEWS: CPT

## 2022-08-29 PROCEDURE — 99284 EMERGENCY DEPT VISIT MOD MDM: CPT | Performed by: PHYSICIAN ASSISTANT

## 2022-08-29 PROCEDURE — 36415 COLL VENOUS BLD VENIPUNCTURE: CPT | Performed by: PHYSICIAN ASSISTANT

## 2022-08-29 PROCEDURE — 84484 ASSAY OF TROPONIN QUANT: CPT | Performed by: PHYSICIAN ASSISTANT

## 2022-08-29 PROCEDURE — 72040 X-RAY EXAM NECK SPINE 2-3 VW: CPT

## 2022-08-29 PROCEDURE — 84443 ASSAY THYROID STIM HORMONE: CPT | Performed by: PHYSICIAN ASSISTANT

## 2022-08-29 PROCEDURE — 80053 COMPREHEN METABOLIC PANEL: CPT | Performed by: PHYSICIAN ASSISTANT

## 2022-08-29 PROCEDURE — 85025 COMPLETE CBC W/AUTO DIFF WBC: CPT | Performed by: PHYSICIAN ASSISTANT

## 2022-08-29 ASSESSMENT — ENCOUNTER SYMPTOMS
GASTROINTESTINAL NEGATIVE: 1
CONSTITUTIONAL NEGATIVE: 1
NEUROLOGICAL NEGATIVE: 1
RESPIRATORY NEGATIVE: 1
NECK PAIN: 1

## 2022-08-29 ASSESSMENT — ACTIVITIES OF DAILY LIVING (ADL): ADLS_ACUITY_SCORE: 35

## 2022-08-29 NOTE — DISCHARGE INSTRUCTIONS
Recommend applying heat to the affected areas and 15 to 20-minute intervals for symptomatic relief.  May also apply icy hot or Voltaren gel to the affected areas of the neck as needed for symptomatic relief of pain.  May also avoid Tylenol or ibuprofen over-the-counter.  Do not apply heat at the same time use IcyHot or Voltaren gel to avoid excess irritation.    Return to clinic for further evaluation if you develop fever of 102 degrees F or greater, worsening chest pain, difficulty breathing, shortness of breath, difficulty swallowing, severe headache, numbness or tingling or weakness in the arms or legs, dizziness or lightheadedness, acute vision changes, acutely worsening rash, or severe abdominal pain.

## 2022-08-29 NOTE — ED TRIAGE NOTES
Complains of 1 month of left shoulder/upper chest pain/discomfort. This comes and goes without specific issue. This pain is described as dull. There is also left foot pain that comes and goes. Denies injury. Lungs are clear bilateral. No pain now.

## 2022-08-29 NOTE — ED PROVIDER NOTES
History     Chief Complaint   Patient presents with     Neck Pain     Chest Pain     Left neck pain x 3 weeks. Today went down arm and to the chest.      HPI  Arvin Ta is a 52 year old male with a past medical history of type 2 diabetes, left knee arthroplasty, chronic kidney disease stage I, essential hypertension, psoriasis, and hypersomnia with sleep apnea who presents for evaluation of left-sided neck pain which began 3 weeks ago.  He is especially concerned because the pain radiated down into his left upper chest today.  Describes the pain as dull.  Also describes having an episode of pain felt in his left elbow which lasted only 30 seconds this morning, then resolved.  He describes having some numbness in his left lower extremity about 3 weeks ago which resolved after 2 minutes.  He is speaking normally, no loss of consciousness, no facial droop, no numbness or tingling or weakness currently in any of his extremities.  No headaches, no acute vision changes, no URI symptoms, no shortness of breath, no abdominal pain, no nausea or vomiting.  He has not taken any medications for symptomatic relief of his neck pain or chest discomfort which developed this morning.  Mainly wants to make sure that he does not have a heart issue currently.  States that he has been under considerable stress at work and has been doing some heavy lifting in a warehouse, typically lifts packs of shingles.  Feels that he may have strained his neck while lifting 3 weeks ago and the pain just has not gotten better and has in fact been exacerbated by repeated lifting.    Allergies:  No Known Allergies    Problem List:    Patient Active Problem List    Diagnosis Date Noted     Essential hypertension with goal blood pressure less than 140/90 07/03/2019     Priority: Medium     CKD (chronic kidney disease) stage 1, GFR 90 ml/min or greater 12/14/2017     Priority: Medium     History of arthroplasty of left knee 12/29/2015     Priority:  Medium     Type 2 diabetes mellitus with diabetic nephropathy (H) 06/22/2015     Priority: Medium     Diagnosis in June, 2013.        Hyperlipidemia LDL goal <100 10/31/2010     Priority: Medium     Hypersomnia with sleep apnea 08/29/2006     Priority: Medium     On CPAP January 2005.  6/24/2015:Uses CPAP every night.   11/23/2020:Uses CPAP nightly.          Other psoriasis 08/29/2006     Priority: Medium     Other tear of cartilage or meniscus of knee, current 08/29/2006     Priority: Medium     posterior medial on left          Past Medical History:    Past Medical History:   Diagnosis Date     Hypertension      Other psoriasis      Sleep apnea      Type 2 diabetes mellitus without complications (H)        Past Surgical History:    Past Surgical History:   Procedure Laterality Date     ARTHROPLASTY KNEE UNICOMPARTMENT Left 12/29/2015    Procedure: ARTHROPLASTY KNEE UNICOMPARTMENT;  Surgeon: Lg Meyers MD;  Location: [facility]     COLONOSCOPY N/A 8/25/2021    Procedure: COLONOSCOPY, WITH POLYPECTOMY AND BIOPSY;  Surgeon: Lefty Alvarez DO;  Location: [facility]     ORTHOPEDIC SURGERY  2008    Knee     SOFT TISSUE SURGERY  1995    Shoulder     SOFT TISSUE SURGERY  1986    Elbow     SURGICAL HISTORY OF -   1994    (R) Shoulder     SURGICAL HISTORY OF -   1987 approx    Pins in elbow left       Family History:    Family History   Problem Relation Age of Onset     Diabetes Mother         type 2     Cancer Father 72        Hodgkins Lymphoma     Heart Disease Father         Stent at age 67     Prostate Cancer No family hx of      Colon Cancer No family hx of        Social History:  Marital Status:   [2]  Social History     Tobacco Use     Smoking status: Never Smoker     Smokeless tobacco: Never Used   Vaping Use     Vaping Use: Never used   Substance Use Topics     Alcohol use: Yes     Comment: occl     Drug use: No        Medications:    ACE/ARB NOT PRESCRIBED, INTENTIONAL,  atorvastatin (LIPITOR) 10  MG tablet  blood glucose (NO BRAND SPECIFIED) lancets standard  blood glucose monitoring (NO BRAND SPECIFIED) meter device kit  blood glucose monitoring (NO BRAND SPECIFIED) test strip  calcipotriene (DOVONOX) 0.005 % external ointment  cetirizine (ZYRTEC) 10 MG tablet  clobetasol (TEMOVATE) 0.05 % external ointment  EPINEPHrine (ANY BX GENERIC EQUIV) 0.3 MG/0.3ML injection 2-pack  glipiZIDE (GLUCOTROL XL) 10 MG 24 hr tablet  lisinopril (ZESTRIL) 10 MG tablet  metFORMIN (GLUCOPHAGE XR) 500 MG 24 hr tablet  metroNIDAZOLE (METROCREAM) 0.75 % external cream          Review of Systems   Constitutional: Negative.    HENT: Negative.    Respiratory: Negative.    Cardiovascular: Positive for chest pain.   Gastrointestinal: Negative.    Genitourinary: Negative.    Musculoskeletal: Positive for neck pain.   Skin: Negative.    Neurological: Negative.        Physical Exam   BP: 116/70  Pulse: 70  Temp: 98.1  F (36.7  C)  Resp: 20  Height: 182.9 cm (6')  Weight: 97.1 kg (214 lb)  SpO2: 97 %      Physical Exam  Constitutional:       General: He is not in acute distress.     Appearance: Normal appearance. He is not ill-appearing, toxic-appearing or diaphoretic.   HENT:      Head: Normocephalic and atraumatic.      Right Ear: Tympanic membrane, ear canal and external ear normal. There is no impacted cerumen.      Left Ear: Tympanic membrane, ear canal and external ear normal. There is no impacted cerumen.      Nose: Nose normal. No congestion or rhinorrhea.      Mouth/Throat:      Mouth: Mucous membranes are moist.      Pharynx: Oropharynx is clear. No oropharyngeal exudate or posterior oropharyngeal erythema.   Neck:      Thyroid: No thyromegaly.     Cardiovascular:      Rate and Rhythm: Normal rate and regular rhythm.      Pulses:           Radial pulses are 2+ on the right side and 2+ on the left side.      Heart sounds: Normal heart sounds. No murmur heard.  Pulmonary:      Effort: Pulmonary effort is normal. No respiratory  distress.      Breath sounds: Normal breath sounds. No stridor. No decreased breath sounds, wheezing, rhonchi or rales.   Chest:      Chest wall: No tenderness.   Musculoskeletal:         General: Normal range of motion.      Cervical back: Normal range of motion and neck supple. No edema, erythema, signs of trauma, rigidity, torticollis or crepitus. No pain with movement, spinous process tenderness or muscular tenderness. Normal range of motion.      Right lower leg: No tenderness. No edema.      Left lower leg: No tenderness. No edema.   Lymphadenopathy:      Cervical: No cervical adenopathy.   Skin:     General: Skin is warm and dry.      Findings: Rash present.      Comments: Erythematous rash present on both lower extremities and the face, consistent with psoriasis   Neurological:      Mental Status: He is alert and oriented to person, place, and time.         ED Course                 Procedures                Results for orders placed or performed during the hospital encounter of 08/29/22 (from the past 24 hour(s))   CBC with platelets differential    Narrative    The following orders were created for panel order CBC with platelets differential.  Procedure                               Abnormality         Status                     ---------                               -----------         ------                     CBC with platelets and d...[[id number]]                      Final result                 Please view results for these tests on the individual orders.   Comprehensive metabolic panel   Result Value Ref Range    Sodium 139 133 - 144 mmol/L    Potassium 4.0 3.4 - 5.3 mmol/L    Chloride 106 94 - 109 mmol/L    Carbon Dioxide (CO2) 28 20 - 32 mmol/L    Anion Gap 5 3 - 14 mmol/L    Urea Nitrogen 10 7 - 30 mg/dL    Creatinine 0.66 0.66 - 1.25 mg/dL    Calcium 9.3 8.5 - 10.1 mg/dL    Glucose 145 (H) 70 - 99 mg/dL    Alkaline Phosphatase 88 40 - 150 U/L    AST 27 0 - 45 U/L    ALT 62 0 - 70 U/L    Protein  Total 7.5 6.8 - 8.8 g/dL    Albumin 4.3 3.4 - 5.0 g/dL    Bilirubin Total 0.6 0.2 - 1.3 mg/dL    GFR Estimate >90 >60 mL/min/1.73m2   Troponin I   Result Value Ref Range    Troponin I High Sensitivity 4 <79 ng/L   TSH with free T4 reflex   Result Value Ref Range    TSH 1.36 0.40 - 4.00 mU/L   CBC with platelets and differential   Result Value Ref Range    WBC Count 6.2 4.0 - 11.0 10e3/uL    RBC Count 4.76 4.40 - 5.90 10e6/uL    Hemoglobin 14.5 13.3 - 17.7 g/dL    Hematocrit 41.5 40.0 - 53.0 %    MCV 87 78 - 100 fL    MCH 30.5 26.5 - 33.0 pg    MCHC 34.9 31.5 - 36.5 g/dL    RDW 11.9 10.0 - 15.0 %    Platelet Count 234 150 - 450 10e3/uL    % Neutrophils 64 %    % Lymphocytes 25 %    % Monocytes 9 %    % Eosinophils 2 %    % Basophils 0 %    % Immature Granulocytes 0 %    NRBCs per 100 WBC 0 <1 /100    Absolute Neutrophils 4.0 1.6 - 8.3 10e3/uL    Absolute Lymphocytes 1.6 0.8 - 5.3 10e3/uL    Absolute Monocytes 0.6 0.0 - 1.3 10e3/uL    Absolute Eosinophils 0.1 0.0 - 0.7 10e3/uL    Absolute Basophils 0.0 0.0 - 0.2 10e3/uL    Absolute Immature Granulocytes 0.0 <=0.4 10e3/uL    Absolute NRBCs 0.0 10e3/uL   [facility] Draw    Narrative    The following orders were created for panel order [facility] Draw.  Procedure                               Abnormality         Status                     ---------                               -----------         ------                     Extra Blue Top Tube[[id number]]                              Final result                 Please view results for these tests on the individual orders.   Extra Blue Top Tube   Result Value Ref Range    Hold Specimen JIC    XR Chest 2 Views    Narrative    EXAM: XR CHEST 2 VIEWS  LOCATION: [facility]  DATE/TIME: 8/29/2022 4:33 PM    INDICATION: left sided upper chest discomfort  COMPARISON: None.      Impression    IMPRESSION: Negative chest.   Cervical spine XR, 2-3 views    Narrative    EXAM: XR CERVICAL SPINE 2/3  VIEWS  LOCATION: [facility]  DATE/TIME: 8/29/2022 4:48 PM    INDICATION: neck pain  COMPARISON: None.  TECHNIQUE: CR Cervical Spine.      Impression    IMPRESSION: The open-mouth view is suboptimal for evaluating the odontoid. Vertebral body height and alignment is normal. Mild loss of disc height at C4-5. No radiographic evidence for acute fracture or subluxation. Normal prevertebral soft tissues.         Medications - No data to display    Assessments & Plan (with Medical Decision Making)     The patient is a 52-year-old male presenting for evaluation of left-sided neck pain and upper chest discomfort.  Neck pain began 3 weeks ago, he began to experience chest discomfort on the upper left side today.  He is mainly concerned about ruling out ACS today.  No concerns identified on EKG or laboratory work-up including CBC, CMP, TSH, troponin.  Chest x-ray and x-ray of the cervical spine showed no acute concerns.    He has no symptoms consistent with stroke such as speech changes facial drooping, numbness or tingling or weakness in the extremities currently.  He appears well, no acute distress.    I feel the patient's presentation is consistent with a muscle strain involving the neck radiating to the upper chest, likely due to repetitive lifting at work.  Recommend applying heat to the affected areas and 15 to 20-minute intervals for symptomatic relief.  May also apply icy hot or Voltaren gel to the affected areas of the neck as needed for symptomatic relief of pain.  May also avoid Tylenol or ibuprofen over-the-counter.  Do not apply heat at the same time use IcyHot or Voltaren gel to avoid excess irritation.    Provided a referral to orthopedics for further evaluation and management considering the patient experienced symptoms similar to tennis elbow today.  I have concerns for bulging disc or nerve impingement in the cervical spine given his report of transient sensory change and pain in  the left elbow.    Return to clinic for further evaluation if you develop fever of 102 degrees F or greater, worsening chest pain, difficulty breathing, shortness of breath, difficulty swallowing, severe headache, numbness or tingling or weakness in the arms or legs, dizziness or lightheadedness, acute vision changes, acutely worsening rash, or severe abdominal pain.    The patient chose to leave without any further evaluation beyond an EKG, basic labs such as CBC, CMP, TSH, and troponin. Did not want to stay in clinic.     I have reviewed the nursing notes.    I have reviewed the findings, diagnosis, plan and need for follow up with the patient.    New Prescriptions    No medications on file       Final diagnoses:   Neck pain       8/29/2022   [facility] EMERGENCY DEPT     Randy Andrade PA-C  08/29/22 [id number]

## 2022-09-16 DIAGNOSIS — N18.1 CKD (CHRONIC KIDNEY DISEASE) STAGE 1, GFR 90 ML/MIN OR GREATER: ICD-10-CM

## 2022-09-16 DIAGNOSIS — E11.21 TYPE 2 DIABETES MELLITUS WITH DIABETIC NEPHROPATHY, WITHOUT LONG-TERM CURRENT USE OF INSULIN (H): ICD-10-CM

## 2022-09-16 RX ORDER — LISINOPRIL 10 MG/1
10 TABLET ORAL DAILY
Qty: 30 TABLET | Refills: 0 | Status: SHIPPED | OUTPATIENT
Start: 2022-09-16 | End: 2022-09-21

## 2022-09-16 RX ORDER — GLIPIZIDE 10 MG/1
10 TABLET, FILM COATED, EXTENDED RELEASE ORAL DAILY
Qty: 30 TABLET | Refills: 0 | Status: SHIPPED | OUTPATIENT
Start: 2022-09-16 | End: 2022-09-21

## 2022-09-16 NOTE — TELEPHONE ENCOUNTER
Medication Question or Refill    Contacts       Type Contact Phone/Fax    09/16/2022 01:35 PM CDT Phone (Incoming) Vic Ta (Self) [phone] (H)          What medication are you calling about (include dose and sig)?: Glipizide and Lisinopril    Controlled Substance Agreement on file:   CSA -- Patient Level:    CSA: None found at the patient level.       Who prescribed the medication?: Omar Parker    Do you need a refill? Yes:     When did you use the medication last? NA    Patient offered an appointment? Yes: Pt made appt on 9/21/22     Do you have any questions or concerns?  No    Preferred Pharmacy:   [place] [address]  Phone: [phone] Fax: [phone]          Could we send this information to you in [facility] or would you prefer to receive a phone call?:   Patient would prefer a phone call   Okay to leave a detailed message?: Yes at Home number on file [phone] (home)

## 2022-09-16 NOTE — TELEPHONE ENCOUNTER
Prescription approved per [facility] Refill Protocol for lisinopril.   A prescription for glipizide refilled for 1 month. Patient has upcoming appt 09/21/22.  Thuy RAMÍREZ RN

## 2022-09-19 DIAGNOSIS — E11.21 TYPE 2 DIABETES MELLITUS WITH DIABETIC NEPHROPATHY, WITHOUT LONG-TERM CURRENT USE OF INSULIN (H): ICD-10-CM

## 2022-09-20 RX ORDER — METFORMIN HCL 500 MG
TABLET, EXTENDED RELEASE 24 HR ORAL
Qty: 360 TABLET | Refills: 3 | Status: SHIPPED | OUTPATIENT
Start: 2022-09-20 | End: 2022-09-21

## 2022-09-21 ENCOUNTER — OFFICE VISIT (OUTPATIENT)
Dept: FAMILY MEDICINE | Facility: CLINIC | Age: 52
End: 2022-09-21
Payer: COMMERCIAL

## 2022-09-21 VITALS
BODY MASS INDEX: 28.99 KG/M2 | OXYGEN SATURATION: 99 % | WEIGHT: 214 LBS | HEART RATE: 58 BPM | DIASTOLIC BLOOD PRESSURE: 82 MMHG | TEMPERATURE: 97.9 F | SYSTOLIC BLOOD PRESSURE: 126 MMHG | RESPIRATION RATE: 16 BRPM | HEIGHT: 72 IN

## 2022-09-21 DIAGNOSIS — N18.1 CKD (CHRONIC KIDNEY DISEASE) STAGE 1, GFR 90 ML/MIN OR GREATER: ICD-10-CM

## 2022-09-21 DIAGNOSIS — E11.21 TYPE 2 DIABETES MELLITUS WITH DIABETIC NEPHROPATHY, WITHOUT LONG-TERM CURRENT USE OF INSULIN (H): Primary | ICD-10-CM

## 2022-09-21 DIAGNOSIS — E78.5 HYPERLIPIDEMIA LDL GOAL <100: ICD-10-CM

## 2022-09-21 DIAGNOSIS — R07.89 PRESSURE IN LEFT SIDE OF CHEST: ICD-10-CM

## 2022-09-21 LAB
ALBUMIN UR-MCNC: NEGATIVE MG/DL
APPEARANCE UR: CLEAR
BILIRUB UR QL STRIP: NEGATIVE
CHOLEST SERPL-MCNC: 119 MG/DL
COLOR UR AUTO: YELLOW
CREAT UR-MCNC: 60.7 MG/DL
GLUCOSE UR STRIP-MCNC: 100 MG/DL
HBA1C MFR BLD: 7.6 % (ref 0–5.6)
HDLC SERPL-MCNC: 40 MG/DL
HGB UR QL STRIP: NEGATIVE
KETONES UR STRIP-MCNC: NEGATIVE MG/DL
LDLC SERPL CALC-MCNC: 60 MG/DL
LEUKOCYTE ESTERASE UR QL STRIP: NEGATIVE
MICROALBUMIN UR-MCNC: <12 MG/L
MICROALBUMIN/CREAT UR: NORMAL MG/G{CREAT}
NITRATE UR QL: NEGATIVE
NONHDLC SERPL-MCNC: 79 MG/DL
PH UR STRIP: 6.5 [PH] (ref 5–7)
SP GR UR STRIP: 1.01 (ref 1–1.03)
TRIGL SERPL-MCNC: 96 MG/DL
UROBILINOGEN UR STRIP-ACNC: 0.2 E.U./DL

## 2022-09-21 PROCEDURE — 82043 UR ALBUMIN QUANTITATIVE: CPT | Performed by: NURSE PRACTITIONER

## 2022-09-21 PROCEDURE — 80061 LIPID PANEL: CPT | Performed by: NURSE PRACTITIONER

## 2022-09-21 PROCEDURE — 36415 COLL VENOUS BLD VENIPUNCTURE: CPT | Performed by: NURSE PRACTITIONER

## 2022-09-21 PROCEDURE — 99214 OFFICE O/P EST MOD 30 MIN: CPT | Performed by: NURSE PRACTITIONER

## 2022-09-21 PROCEDURE — 83036 HEMOGLOBIN GLYCOSYLATED A1C: CPT | Performed by: NURSE PRACTITIONER

## 2022-09-21 PROCEDURE — 81003 URINALYSIS AUTO W/O SCOPE: CPT | Performed by: NURSE PRACTITIONER

## 2022-09-21 PROCEDURE — 99207 PR FOOT EXAM NO CHARGE: CPT | Performed by: NURSE PRACTITIONER

## 2022-09-21 RX ORDER — ATORVASTATIN CALCIUM 10 MG/1
10 TABLET, FILM COATED ORAL DAILY
Qty: 90 TABLET | Refills: 3 | Status: SHIPPED | OUTPATIENT
Start: 2022-09-21 | End: 2023-02-21

## 2022-09-21 RX ORDER — METFORMIN HCL 500 MG
1000 TABLET, EXTENDED RELEASE 24 HR ORAL 2 TIMES DAILY WITH MEALS
Qty: 360 TABLET | Refills: 3 | Status: SHIPPED | OUTPATIENT
Start: 2022-09-21 | End: 2023-11-30

## 2022-09-21 RX ORDER — GLIPIZIDE 10 MG/1
10 TABLET, FILM COATED, EXTENDED RELEASE ORAL DAILY
Qty: 90 TABLET | Refills: 3 | Status: SHIPPED | OUTPATIENT
Start: 2022-09-21 | End: 2023-11-01

## 2022-09-21 RX ORDER — LISINOPRIL 10 MG/1
10 TABLET ORAL DAILY
Qty: 90 TABLET | Refills: 3 | Status: SHIPPED | OUTPATIENT
Start: 2022-09-21 | End: 2023-11-01

## 2022-09-21 ASSESSMENT — PAIN SCALES - GENERAL: PAINLEVEL: NO PAIN (0)

## 2022-09-21 NOTE — PROGRESS NOTES
Assessment & Plan     Type 2 diabetes mellitus with diabetic nephropathy, without long-term current use of insulin (H)  On schedule yearly eye exam  - Adult Eye  Referral  Dental exam every 6 months  Labs done today  Medications refilled  We will contact with results as they become available  - Albumin Random Urine Quantitative with Creat Ratio  - HEMOGLOBIN A1C  - glipiZIDE (GLUCOTROL XL) 10 MG 24 hr tablet  Dispense: 90 tablet; Refill: 3  - metFORMIN (GLUCOPHAGE XR) 500 MG 24 hr tablet  Dispense: 360 tablet; Refill: 3  - HEMOGLOBIN A1C  - Albumin Random Urine Quantitative with Creat Ratio    Pressure in left side of chest  Intermittent chest pain.  History of type 2 diabetes and hyperlipidemia  Stress test recommended  - NM Lexiscan stress test    CKD (chronic kidney disease) stage 1, GFR 90 ml/min or greater  Continue to monitor.  Continue ACE inhibitor.  Labs done today to monitor  - lisinopril (ZESTRIL) 10 MG tablet  Dispense: 90 tablet; Refill: 3    - UA Macro with Reflex to Micro and Culture - lab collect    Hyperlipidemia LDL goal <100  LDL Cholesterol Calculated   Date Value Ref Range Status   09/21/2022 60 <=100 mg/dL Final   11/23/2020 56 <100 mg/dL Final     Comment:     Desirable:       <100 mg/dl     Meeting goal.  Continue atorvastatin  - atorvastatin (LIPITOR) 10 MG tablet  Dispense: 90 tablet; Refill: 3    Call or return to the clinic with any worsening of symptoms or no resolution. Patient/Parent verbalized understanding and is in agreement. Medication side effects reviewed.   Current Outpatient Medications   Medication Sig Dispense Refill     ACE/ARB NOT PRESCRIBED, INTENTIONAL, 1 each once ACE & ARB not prescribed due to Other: No CKD or hypertension       atorvastatin (LIPITOR) 10 MG tablet Take 1 tablet (10 mg) by mouth daily 90 tablet 3     blood glucose (NO BRAND SPECIFIED) lancets standard Use to test blood sugar 1 times daily or as directed. 100 each 11     blood glucose  monitoring (NO BRAND SPECIFIED) meter device kit Use to test blood sugar 1 times daily or as directed. 1 kit 0     blood glucose monitoring (NO BRAND SPECIFIED) test strip Use to test blood sugars 1 times daily or as directed 100 strip 11     calcipotriene (DOVONOX) 0.005 % external ointment Apply daily to affected areas on body in the evening. 120 g 5     cetirizine (ZYRTEC) 10 MG tablet Take 10 mg by mouth daily as needed for allergies (Summer Months (hayfever))  30 tablet 1     clobetasol (TEMOVATE) 0.05 % external ointment Apply topically daily Apply to Elbows, Legs, and Knees for Psoriasis in the morning. 120 g 6     glipiZIDE (GLUCOTROL XL) 10 MG 24 hr tablet Take 1 tablet (10 mg) by mouth daily 90 tablet 3     lisinopril (ZESTRIL) 10 MG tablet Take 1 tablet (10 mg) by mouth daily 90 tablet 3     metFORMIN (GLUCOPHAGE XR) 500 MG 24 hr tablet Take 2 tablets (1,000 mg) by mouth 2 times daily (with meals) 360 tablet 3     metroNIDAZOLE (METROCREAM) 0.75 % external cream Apply twice daily to face. 45 g 5     EPINEPHrine (ANY BX GENERIC EQUIV) 0.3 MG/0.3ML injection 2-pack Inject 0.3 mLs (0.3 mg) into the muscle once as needed for anaphylaxis (Patient not taking: Reported on 9/21/2022) 2 each 0     Chart documentation with Dragon Voice recognition Software. Although reviewed after completion, some words and grammatical errors may remain.  Pneumococcal vaccine declined.  COVID-vaccine declined.  Flu shot declined    Return in about 6 months (around 3/21/2023).    SHY Bang CNP  [facility]    Roby Ho is a 52 year old, presenting for the following health issues:  Diabetes, Hypertension, and Lipids      History of Present Illness       Diabetes:   He presents for follow up of diabetes.  He is checking home blood glucose a few times a month. He checks blood glucose before meals.  Blood glucose is sometimes over 200 and never under 70. He is aware of hypoglycemia  symptoms including shakiness and weakness. He is concerned about other.  He is not experiencing numbness or burning in feet, excessive thirst, blurry vision, weight changes or redness, sores or blisters on feet. The patient has not had a diabetic eye exam in the last 12 months.         He eats 2-3 servings of fruits and vegetables daily.He consumes 0 sweetened beverage(s) daily.He exercises with enough effort to increase his heart rate 20 to 29 minutes per day.  He exercises with enough effort to increase his heart rate 3 or less days per week.   He is taking medications regularly.     180-230 some days 120 sometimes 220  Work is really stressful  Pulled in a lot of directions.  DIOGENES CPAP x 20 yrs   Daibetes x 7 yrs   Hypertension Follow-up      Do you check your blood pressure regularly outside of the clinic? Yes     Are you following a low salt diet? No    Are your blood pressures ever more than 140 on the top number (systolic) OR more   than 90 on the bottom number (diastolic), for example 140/90? Yes   BP Readings from Last 3 Encounters:   09/21/22 126/82   08/29/22 127/89   08/25/21 (!) 127/98             Review of Systems   Constitutional, HEENT, cardiovascular, pulmonary, GI, , musculoskeletal, neuro, skin, endocrine and psych systems are negative, except as otherwise noted.      Objective    /82   Pulse 58   Temp 97.9  F (36.6  C) (Tympanic)   Resp 16   Ht 1.829 m (6')   Wt 97.1 kg (214 lb)   SpO2 99%   BMI 29.02 kg/m    Body mass index is 29.02 kg/m .  Physical Exam   GENERAL: healthy, alert and no distress  EYES: Eyes grossly normal to inspection, PERRL and conjunctivae and sclerae normal  HENT: ear canals and TM's normal, nose and mouth without ulcers or lesions  NECK: no adenopathy, no asymmetry, masses, or scars and thyroid normal to palpation  RESP: lungs clear to auscultation - no rales, rhonchi or wheezes  CV: regular rate and rhythm, normal S1 S2, no S3 or S4, no murmur, click or rub,  no peripheral edema and peripheral pulses strong  ABDOMEN: soft, nontender, no hepatosplenomegaly, no masses and bowel sounds normal  MS: no gross musculoskeletal defects noted, no edema  SKIN: no suspicious lesions or rashes  NEURO: Normal strength and tone, mentation intact and speech normal  PSYCH: mentation appears normal, affect normal/bright  Diabetic foot exam: normal DP and PT pulses, no trophic changes or ulcerative lesions and normal sensory exam    Admission on 08/29/2022, Discharged on 08/29/2022   Component Date Value Ref Range Status     Sodium 08/29/2022 139  133 - 144 mmol/L Final     Potassium 08/29/2022 4.0  3.4 - 5.3 mmol/L Final     Chloride 08/29/2022 106  94 - 109 mmol/L Final     Carbon Dioxide (CO2) 08/29/2022 28  20 - 32 mmol/L Final     Anion Gap 08/29/2022 5  3 - 14 mmol/L Final     Urea Nitrogen 08/29/2022 10  7 - 30 mg/dL Final     Creatinine 08/29/2022 0.66  0.66 - 1.25 mg/dL Final     Calcium 08/29/2022 9.3  8.5 - 10.1 mg/dL Final     Glucose 08/29/2022 145 (A) 70 - 99 mg/dL Final     Alkaline Phosphatase 08/29/2022 88  40 - 150 U/L Final     AST 08/29/2022 27  0 - 45 U/L Final     ALT 08/29/2022 62  0 - 70 U/L Final     Protein Total 08/29/2022 7.5  6.8 - 8.8 g/dL Final     Albumin 08/29/2022 4.3  3.4 - 5.0 g/dL Final     Bilirubin Total 08/29/2022 0.6  0.2 - 1.3 mg/dL Final     GFR Estimate 08/29/2022 >90  >60 mL/min/1.73m2 Final    Effective December 21, 2021 eGFRcr in adults is calculated using the 2021 CKD-EPI creatinine equation which includes age and gender (Rey et al., NEJM, DOI: 10.1056/DXMHfj9612112)     Troponin I High Sensitivity 08/29/2022 4  <79 ng/L Final    This Troponin-I result was obtained using a Siemens [employer] [place] High Sensitivity Troponin-I assay (TNIH). Effective 11/23/21, nine labs/sites in the [facility] switched from a Siemens [place] Troponin I assay (CTNI) to a Siemens [place] High-Sensitivity Troponin I assay (TNIH).     TSH  08/29/2022 1.36  0.40 - 4.00 mU/L Final     WBC Count 08/29/2022 6.2  4.0 - 11.0 10e3/uL Final     RBC Count 08/29/2022 4.76  4.40 - 5.90 10e6/uL Final     Hemoglobin 08/29/2022 14.5  13.3 - 17.7 g/dL Final     Hematocrit 08/29/2022 41.5  40.0 - 53.0 % Final     MCV 08/29/2022 87  78 - 100 fL Final     MCH 08/29/2022 30.5  26.5 - 33.0 pg Final     MCHC 08/29/2022 34.9  31.5 - 36.5 g/dL Final     RDW 08/29/2022 11.9  10.0 - 15.0 % Final     Platelet Count 08/29/2022 234  150 - 450 10e3/uL Final     % Neutrophils 08/29/2022 64  % Final     % Lymphocytes 08/29/2022 25  % Final     % Monocytes 08/29/2022 9  % Final     % Eosinophils 08/29/2022 2  % Final     % Basophils 08/29/2022 0  % Final     % Immature Granulocytes 08/29/2022 0  % Final     NRBCs per 100 WBC 08/29/2022 0  <1 /100 Final     Absolute Neutrophils 08/29/2022 4.0  1.6 - 8.3 10e3/uL Final     Absolute Lymphocytes 08/29/2022 1.6  0.8 - 5.3 10e3/uL Final     Absolute Monocytes 08/29/2022 0.6  0.0 - 1.3 10e3/uL Final     Absolute Eosinophils 08/29/2022 0.1  0.0 - 0.7 10e3/uL Final     Absolute Basophils 08/29/2022 0.0  0.0 - 0.2 10e3/uL Final     Absolute Immature Granulocytes 08/29/2022 0.0  <=0.4 10e3/uL Final     Absolute NRBCs 08/29/2022 0.0  10e3/uL Final     Hold Specimen 08/29/2022 [facility]   Final

## 2022-11-21 ENCOUNTER — HEALTH MAINTENANCE LETTER (OUTPATIENT)
Age: 52
End: 2022-11-21

## 2023-02-20 DIAGNOSIS — E78.5 HYPERLIPIDEMIA LDL GOAL <100: ICD-10-CM

## 2023-02-21 RX ORDER — ATORVASTATIN CALCIUM 10 MG/1
TABLET, FILM COATED ORAL
Qty: 90 TABLET | Refills: 1 | Status: SHIPPED | OUTPATIENT
Start: 2023-02-21 | End: 2023-11-30

## 2023-04-16 ENCOUNTER — HEALTH MAINTENANCE LETTER (OUTPATIENT)
Age: 53
End: 2023-04-16

## 2023-04-26 DIAGNOSIS — E11.21 TYPE 2 DIABETES MELLITUS WITH DIABETIC NEPHROPATHY, WITHOUT LONG-TERM CURRENT USE OF INSULIN (H): ICD-10-CM

## 2023-04-26 RX ORDER — GLIPIZIDE 10 MG/1
TABLET, FILM COATED, EXTENDED RELEASE ORAL
Qty: 90 TABLET | Refills: 3 | OUTPATIENT
Start: 2023-04-26

## 2023-10-27 DIAGNOSIS — N18.1 CKD (CHRONIC KIDNEY DISEASE) STAGE 1, GFR 90 ML/MIN OR GREATER: ICD-10-CM

## 2023-10-27 DIAGNOSIS — E11.21 TYPE 2 DIABETES MELLITUS WITH DIABETIC NEPHROPATHY, WITHOUT LONG-TERM CURRENT USE OF INSULIN (H): ICD-10-CM

## 2023-11-01 RX ORDER — GLIPIZIDE 10 MG/1
10 TABLET, FILM COATED, EXTENDED RELEASE ORAL DAILY
Qty: 90 TABLET | Refills: 0 | Status: SHIPPED | OUTPATIENT
Start: 2023-11-01 | End: 2023-11-30

## 2023-11-01 RX ORDER — LISINOPRIL 10 MG/1
10 TABLET ORAL DAILY
Qty: 90 TABLET | Refills: 0 | Status: SHIPPED | OUTPATIENT
Start: 2023-11-01

## 2023-11-01 NOTE — TELEPHONE ENCOUNTER
Needs to be seen for further refills.  Please send letter. Due for fasting labs and diabetic follow up   Thanks Jenny Cabrales [facility]

## 2023-11-26 ENCOUNTER — HEALTH MAINTENANCE LETTER (OUTPATIENT)
Age: 53
End: 2023-11-26

## 2023-11-30 ENCOUNTER — OFFICE VISIT (OUTPATIENT)
Dept: FAMILY MEDICINE | Facility: CLINIC | Age: 53
End: 2023-11-30
Payer: COMMERCIAL

## 2023-11-30 VITALS
RESPIRATION RATE: 14 BRPM | HEIGHT: 73 IN | OXYGEN SATURATION: 100 % | HEART RATE: 88 BPM | BODY MASS INDEX: 28.89 KG/M2 | DIASTOLIC BLOOD PRESSURE: 87 MMHG | WEIGHT: 218 LBS | TEMPERATURE: 97.3 F | SYSTOLIC BLOOD PRESSURE: 138 MMHG

## 2023-11-30 DIAGNOSIS — E78.5 HYPERLIPIDEMIA LDL GOAL <100: ICD-10-CM

## 2023-11-30 DIAGNOSIS — E11.21 TYPE 2 DIABETES MELLITUS WITH DIABETIC NEPHROPATHY, WITHOUT LONG-TERM CURRENT USE OF INSULIN (H): Primary | ICD-10-CM

## 2023-11-30 DIAGNOSIS — I10 PRIMARY HYPERTENSION: ICD-10-CM

## 2023-11-30 DIAGNOSIS — L40.8 OTHER PSORIASIS: ICD-10-CM

## 2023-11-30 DIAGNOSIS — N18.1 CKD (CHRONIC KIDNEY DISEASE) STAGE 1, GFR 90 ML/MIN OR GREATER: ICD-10-CM

## 2023-11-30 LAB
ALBUMIN SERPL BCG-MCNC: 4.8 G/DL (ref 3.5–5.2)
ALP SERPL-CCNC: 86 U/L (ref 40–150)
ALT SERPL W P-5'-P-CCNC: 84 U/L (ref 0–70)
ANION GAP SERPL CALCULATED.3IONS-SCNC: 14 MMOL/L (ref 7–15)
AST SERPL W P-5'-P-CCNC: 40 U/L (ref 0–45)
BASOPHILS # BLD AUTO: 0 10E3/UL (ref 0–0.2)
BASOPHILS NFR BLD AUTO: 1 %
BILIRUB SERPL-MCNC: 1.2 MG/DL
BUN SERPL-MCNC: 11.7 MG/DL (ref 6–20)
CALCIUM SERPL-MCNC: 9.8 MG/DL (ref 8.6–10)
CHLORIDE SERPL-SCNC: 96 MMOL/L (ref 98–107)
CHOLEST SERPL-MCNC: 133 MG/DL
CREAT SERPL-MCNC: 0.87 MG/DL (ref 0.67–1.17)
CREAT UR-MCNC: 107.2 MG/DL
DEPRECATED HCO3 PLAS-SCNC: 23 MMOL/L (ref 22–29)
EGFRCR SERPLBLD CKD-EPI 2021: >90 ML/MIN/1.73M2
EOSINOPHIL # BLD AUTO: 0.1 10E3/UL (ref 0–0.7)
EOSINOPHIL NFR BLD AUTO: 3 %
ERYTHROCYTE [DISTWIDTH] IN BLOOD BY AUTOMATED COUNT: 11.9 % (ref 10–15)
GLUCOSE SERPL-MCNC: 275 MG/DL (ref 70–99)
HBA1C MFR BLD: 9.1 % (ref 0–5.6)
HCT VFR BLD AUTO: 45.5 % (ref 40–53)
HDLC SERPL-MCNC: 36 MG/DL
HGB BLD-MCNC: 16.1 G/DL (ref 13.3–17.7)
HOLD SPECIMEN: NORMAL
IMM GRANULOCYTES # BLD: 0 10E3/UL
IMM GRANULOCYTES NFR BLD: 0 %
LDLC SERPL CALC-MCNC: 70 MG/DL
LYMPHOCYTES # BLD AUTO: 1 10E3/UL (ref 0.8–5.3)
LYMPHOCYTES NFR BLD AUTO: 25 %
MCH RBC QN AUTO: 30.9 PG (ref 26.5–33)
MCHC RBC AUTO-ENTMCNC: 35.4 G/DL (ref 31.5–36.5)
MCV RBC AUTO: 87 FL (ref 78–100)
MICROALBUMIN UR-MCNC: <12 MG/L
MICROALBUMIN/CREAT UR: NORMAL MG/G{CREAT}
MONOCYTES # BLD AUTO: 0.4 10E3/UL (ref 0–1.3)
MONOCYTES NFR BLD AUTO: 10 %
NEUTROPHILS # BLD AUTO: 2.6 10E3/UL (ref 1.6–8.3)
NEUTROPHILS NFR BLD AUTO: 62 %
NONHDLC SERPL-MCNC: 97 MG/DL
PLATELET # BLD AUTO: 182 10E3/UL (ref 150–450)
POTASSIUM SERPL-SCNC: 5 MMOL/L (ref 3.4–5.3)
PROT SERPL-MCNC: 7.2 G/DL (ref 6.4–8.3)
RBC # BLD AUTO: 5.21 10E6/UL (ref 4.4–5.9)
SODIUM SERPL-SCNC: 133 MMOL/L (ref 135–145)
TRIGL SERPL-MCNC: 133 MG/DL
WBC # BLD AUTO: 4.1 10E3/UL (ref 4–11)

## 2023-11-30 PROCEDURE — 85025 COMPLETE CBC W/AUTO DIFF WBC: CPT | Performed by: NURSE PRACTITIONER

## 2023-11-30 PROCEDURE — 82043 UR ALBUMIN QUANTITATIVE: CPT | Performed by: NURSE PRACTITIONER

## 2023-11-30 PROCEDURE — 80053 COMPREHEN METABOLIC PANEL: CPT | Performed by: NURSE PRACTITIONER

## 2023-11-30 PROCEDURE — 80061 LIPID PANEL: CPT | Performed by: NURSE PRACTITIONER

## 2023-11-30 PROCEDURE — 83036 HEMOGLOBIN GLYCOSYLATED A1C: CPT | Performed by: NURSE PRACTITIONER

## 2023-11-30 PROCEDURE — 99214 OFFICE O/P EST MOD 30 MIN: CPT | Performed by: NURSE PRACTITIONER

## 2023-11-30 PROCEDURE — 36415 COLL VENOUS BLD VENIPUNCTURE: CPT | Performed by: NURSE PRACTITIONER

## 2023-11-30 PROCEDURE — 82570 ASSAY OF URINE CREATININE: CPT | Performed by: NURSE PRACTITIONER

## 2023-11-30 RX ORDER — LISINOPRIL 20 MG/1
20 TABLET ORAL DAILY
Qty: 90 TABLET | Refills: 1 | Status: SHIPPED | OUTPATIENT
Start: 2023-11-30 | End: 2024-04-16

## 2023-11-30 RX ORDER — GLIPIZIDE 10 MG/1
10 TABLET, FILM COATED, EXTENDED RELEASE ORAL DAILY
Qty: 90 TABLET | Refills: 3 | Status: SHIPPED | OUTPATIENT
Start: 2023-11-30

## 2023-11-30 RX ORDER — METFORMIN HCL 500 MG
1000 TABLET, EXTENDED RELEASE 24 HR ORAL 2 TIMES DAILY WITH MEALS
Qty: 360 TABLET | Refills: 3 | Status: SHIPPED | OUTPATIENT
Start: 2023-11-30

## 2023-11-30 RX ORDER — FLASH GLUCOSE SENSOR
KIT MISCELLANEOUS
Qty: 2 EACH | Refills: 5 | Status: SHIPPED | OUTPATIENT
Start: 2023-11-30

## 2023-11-30 RX ORDER — LISINOPRIL 10 MG/1
10 TABLET ORAL DAILY
Qty: 90 TABLET | Refills: 0 | Status: CANCELLED | OUTPATIENT
Start: 2023-11-30

## 2023-11-30 RX ORDER — ATORVASTATIN CALCIUM 10 MG/1
10 TABLET, FILM COATED ORAL DAILY
Qty: 90 TABLET | Refills: 3 | Status: SHIPPED | OUTPATIENT
Start: 2023-11-30

## 2023-11-30 RX ORDER — CALCIPOTRIENE 50 UG/G
OINTMENT TOPICAL
Qty: 120 G | Refills: 5 | Status: SHIPPED | OUTPATIENT
Start: 2023-11-30

## 2023-11-30 RX ORDER — CLOBETASOL PROPIONATE 0.5 MG/G
OINTMENT TOPICAL DAILY
Qty: 120 G | Refills: 6 | Status: SHIPPED | OUTPATIENT
Start: 2023-11-30

## 2023-11-30 ASSESSMENT — PAIN SCALES - GENERAL: PAINLEVEL: NO PAIN (0)

## 2023-11-30 NOTE — PROGRESS NOTES
Assessment & Plan     Type 2 diabetes mellitus with diabetic nephropathy, without long-term current use of insulin (H)  Poor control will work on lifestyle changes. Declined further medication at this time  Will try to get a CGM for him  Increase activity to 30-60 minutes 4 days a week to break a sweat   Needs eye exam yearly  Goals reviewed   < 100 fasting < 140 2 hours post prandial   - Adult Eye  Referral  - metFORMIN (GLUCOPHAGE XR) 500 MG 24 hr tablet  Dispense: 360 tablet; Refill: 3  - glipiZIDE (GLUCOTROL XL) 10 MG 24 hr tablet  Dispense: 90 tablet; Refill: 3  - CBC with platelets and differential  - Continuous Blood Gluc Sensor (FREESTYLE BONNIE 2 SENSOR) MISC  Dispense: 2 each; Refill: 5  - Continuous Blood Gluc  (FREESTYLE BONNIE 2 READER) SATISH  Dispense: 1 each; Refill: 0  - Continuous Blood Gluc Sensor (FREESTYLE BONNIE 14 DAY SENSOR) MISC  Dispense: 2 each; Refill: 5  - CBC with platelets and differential  - **Hemoglobin A1c FUTURE 3mo recheck in 3 months.     CKD (chronic kidney disease) stage 1, GFR 90 ml/min or greater    - Albumin Random Urine Quantitative with Creat Ratio  - CBC with platelets and differential    Other psoriasis    - clobetasol (TEMOVATE) 0.05 % external ointment  Dispense: 120 g; Refill: 6  - calcipotriene (DOVONOX) 0.005 % external ointment  Dispense: 120 g; Refill: 5    Hyperlipidemia LDL goal <100  LDL Cholesterol Calculated   Date Value Ref Range Status   09/21/2022 60 <=100 mg/dL Final   11/23/2020 56 <100 mg/dL Final     Comment:     Desirable:       <100 mg/dl       - atorvastatin (LIPITOR) 10 MG tablet  Dispense: 90 tablet; Refill: 3    Primary hypertension  BP elevated   Increase dose   - lisinopril (ZESTRIL) 20 MG tablet  Dispense: 90 tablet; Refill: 1      Call or return to the clinic with any worsening of symptoms or no resolution. Patient/Parent verbalized understanding and is in agreement. Medication side effects reviewed.   Current Outpatient  Medications   Medication Sig Dispense Refill    ACE/ARB NOT PRESCRIBED, INTENTIONAL, 1 each once ACE & ARB not prescribed due to Other: No CKD or hypertension      atorvastatin (LIPITOR) 10 MG tablet Take 1 tablet (10 mg) by mouth daily 90 tablet 3    blood glucose (NO BRAND SPECIFIED) lancets standard Use to test blood sugar 1 times daily or as directed. 100 each 11    blood glucose monitoring (NO BRAND SPECIFIED) meter device kit Use to test blood sugar 1 times daily or as directed. 1 kit 0    blood glucose monitoring (NO BRAND SPECIFIED) test strip Use to test blood sugars 1 times daily or as directed 100 strip 11    calcipotriene (DOVONOX) 0.005 % external ointment Apply daily to affected areas on body in the evening. 120 g 5    cetirizine (ZYRTEC) 10 MG tablet Take 10 mg by mouth daily as needed for allergies (Summer Months (hayfever))  30 tablet 1    clobetasol (TEMOVATE) 0.05 % external ointment Apply topically daily Apply to Elbows, Legs, and Knees for Psoriasis in the morning. 120 g 6    Continuous Blood Gluc  (FREESTYLE BONNIE 2 READER) SATISH Use to read blood sugars as per 's instructions. 1 each 0    Continuous Blood Gluc Sensor (FREESTYLE BONNIE 14 DAY SENSOR) MISC Change every 14 days. 2 each 5    Continuous Blood Gluc Sensor (FREESTYLE BONNIE 2 SENSOR) MISC Change every 14 days. 2 each 5    EPINEPHrine (ANY BX GENERIC EQUIV) 0.3 MG/0.3ML injection 2-pack Inject 0.3 mLs (0.3 mg) into the muscle once as needed for anaphylaxis 2 each 0    glipiZIDE (GLUCOTROL XL) 10 MG 24 hr tablet Take 1 tablet (10 mg) by mouth daily 90 tablet 3    lisinopril (ZESTRIL) 10 MG tablet TAKE 1 TABLET (10 MG) BY MOUTH DAILY 90 tablet 0    lisinopril (ZESTRIL) 20 MG tablet Take 1 tablet (20 mg) by mouth daily 90 tablet 1    metFORMIN (GLUCOPHAGE XR) 500 MG 24 hr tablet Take 2 tablets (1,000 mg) by mouth 2 times daily (with meals) 360 tablet 3    metroNIDAZOLE (METROCREAM) 0.75 % external cream Apply twice daily  to face. 45 g 5     Chart documentation with Dragon Voice recognition Software. Although reviewed after completion, some words and grammatical errors may remain.  Follow up 3 months.    SHY Bang CNP  M [facility]    Roby Ho is a 53 year old, presenting for the following health issues:  Diabetes and Hypertension        11/30/2023     7:56 AM   Additional Questions   Roomed by Anaya   Accompanied by self       History of Present Illness       Diabetes:   He presents for follow up of diabetes.  He is checking home blood glucose one time daily.   He checks blood glucose before meals, after meals and at bedtime.  Blood glucose is sometimes over 200 and never under 70. He is aware of hypoglycemia symptoms including shakiness.   He is concerned about blood sugar frequently over 200.    He is not experiencing numbness or burning in feet, excessive thirst, blurry vision, weight changes or redness, sores or blisters on feet. The patient has not had a diabetic eye exam in the last 12 months.          He eats 0-1 servings of fruits and vegetables daily.He consumes 0 sweetened beverage(s) daily.He exercises with enough effort to increase his heart rate 10 to 19 minutes per day.  He exercises with enough effort to increase his heart rate 3 or less days per week.   He is taking medications regularly.         Hypertension Follow-up    Do you check your blood pressure regularly outside of the clinic? Yes   Are you following a low salt diet? No  Are your blood pressures ever more than 140 on the top number (systolic) OR more   than 90 on the bottom number (diastolic), for example 140/90? Yes            Review of Systems   Constitutional, HEENT, cardiovascular, pulmonary, GI, , musculoskeletal, neuro, skin, endocrine and psych systems are negative, except as otherwise noted.      Objective    BP (!) 144/87   Pulse 88   Temp 97.3  F (36.3  C) (Tympanic)   Resp 14   Ht 1.854 m  "(6' 1\")   Wt 98.9 kg (218 lb)   SpO2 100%   BMI 28.76 kg/m    Body mass index is 28.76 kg/m .  Physical Exam   GENERAL: healthy, alert and no distress  EYES: Eyes grossly normal to inspection, PERRL and conjunctivae and sclerae normal  HENT: ear canals and TM's normal, nose and mouth without ulcers or lesions  NECK: no adenopathy, no asymmetry, masses, or scars and thyroid normal to palpation  RESP: lungs clear to auscultation - no rales, rhonchi or wheezes  CV: regular rate and rhythm, normal S1 S2, no S3 or S4, no murmur, click or rub, no peripheral edema and peripheral pulses strong  ABDOMEN: soft, nontender, no hepatosplenomegaly, no masses and bowel sounds normal  MS: no gross musculoskeletal defects noted, no edema  SKIN:psoriasis and left lateral heel plantar warts  NEURO: Normal strength and tone, mentation intact and speech normal  PSYCH: mentation appears normal, affect normal/bright    Results for orders placed or performed in visit on 11/30/23 (from the past 24 hour(s))   HEMOGLOBIN A1C   Result Value Ref Range    Hemoglobin A1C 9.1 (H) 0.0 - 5.6 %   CBC with platelets and differential    Narrative    The following orders were created for panel order CBC with platelets and differential.  Procedure                               Abnormality         Status                     ---------                               -----------         ------                     CBC with platelets and d...[[id number]]                      Final result                 Please view results for these tests on the individual orders.   CBC with platelets and differential   Result Value Ref Range    WBC Count 4.1 4.0 - 11.0 10e3/uL    RBC Count 5.21 4.40 - 5.90 10e6/uL    Hemoglobin 16.1 13.3 - 17.7 g/dL    Hematocrit 45.5 40.0 - 53.0 %    MCV 87 78 - 100 fL    MCH 30.9 26.5 - 33.0 pg    MCHC 35.4 31.5 - 36.5 g/dL    RDW 11.9 10.0 - 15.0 %    Platelet Count 182 150 - 450 10e3/uL    % Neutrophils 62 %    % Lymphocytes 25 %    % " Monocytes 10 %    % Eosinophils 3 %    % Basophils 1 %    % Immature Granulocytes 0 %    Absolute Neutrophils 2.6 1.6 - 8.3 10e3/uL    Absolute Lymphocytes 1.0 0.8 - 5.3 10e3/uL    Absolute Monocytes 0.4 0.0 - 1.3 10e3/uL    Absolute Eosinophils 0.1 0.0 - 0.7 10e3/uL    Absolute Basophils 0.0 0.0 - 0.2 10e3/uL    Absolute Immature Granulocytes 0.0 <=0.4 10e3/uL   Extra Tube    Narrative    The following orders were created for panel order Extra Tube.  Procedure                               Abnormality         Status                     ---------                               -----------         ------                     Extra Blue Top Tube[[id number]]                                                         Extra Red Top Tube[[id number]]                               In process                 Extra Green Top (Lithium...[[id number]]                      In process                 Extra Purple Top Tube[[id number]]                            In process                   Please view results for these tests on the individual orders.

## 2024-01-23 ENCOUNTER — TELEPHONE (OUTPATIENT)
Dept: FAMILY MEDICINE | Facility: CLINIC | Age: 54
End: 2024-01-23
Payer: COMMERCIAL

## 2024-01-23 NOTE — TELEPHONE ENCOUNTER
Patient Quality Outreach    Patient is due for the following:   Diabetes -  Eye Exam    Next Steps:       Type of outreach:    Sent [employer] message.      Questions for provider review:    None           Anaya Allen, CMA

## 2024-04-16 DIAGNOSIS — I10 PRIMARY HYPERTENSION: ICD-10-CM

## 2024-04-16 RX ORDER — LISINOPRIL 20 MG/1
20 TABLET ORAL DAILY
Qty: 90 TABLET | Refills: 1 | Status: SHIPPED | OUTPATIENT
Start: 2024-04-16

## 2024-04-20 DIAGNOSIS — N18.1 CKD (CHRONIC KIDNEY DISEASE) STAGE 1, GFR 90 ML/MIN OR GREATER: ICD-10-CM

## 2024-04-20 RX ORDER — LISINOPRIL 10 MG/1
10 TABLET ORAL DAILY
Qty: 90 TABLET | Refills: 0 | Status: CANCELLED | OUTPATIENT
Start: 2024-04-20

## 2024-06-23 ENCOUNTER — HEALTH MAINTENANCE LETTER (OUTPATIENT)
Age: 54
End: 2024-06-23

## 2024-06-25 NOTE — NURSING NOTE
----- Message from Sary Perez RN sent at 6/25/2024  8:31 AM CDT -----  Hi-    Pt is needing to be scheduled to see anesthesia prior to her surgery on 7/2.  Can someone please help me get this pt scheduled?    Thanks for your help.    -Sary   Chief Complaint   Patient presents with     Psoriasis     Follow up        There were no vitals filed for this visit.  Wt Readings from Last 1 Encounters:   08/25/21 97.5 kg (215 lb)       Milagros Fernandez LPN .................6/21/2022

## 2024-10-08 DIAGNOSIS — I10 PRIMARY HYPERTENSION: ICD-10-CM

## 2024-10-09 RX ORDER — LISINOPRIL 20 MG/1
20 TABLET ORAL DAILY
Qty: 90 TABLET | Refills: 0 | Status: SHIPPED | OUTPATIENT
Start: 2024-10-09

## 2024-10-09 RX ORDER — LISINOPRIL 20 MG/1
20 TABLET ORAL DAILY
Qty: 90 TABLET | Refills: 3 | OUTPATIENT
Start: 2024-10-09

## 2024-10-09 NOTE — TELEPHONE ENCOUNTER
Overdue for needed care. Please call to schedule in person clinic visit for diabetic follow up. Once appt is scheduled, route back to the pool.    Julie Behrendt RN

## 2024-10-09 NOTE — TELEPHONE ENCOUNTER
Called and spoke with pt. Appt scheduled with Jenny for 11/13. Pt stated he will be out before appt, wondering if bridge refill can be sent.    Theresa Jimenez Patient

## 2024-10-09 NOTE — TELEPHONE ENCOUNTER
Daya refill given x 1 patient has upcoming appointment 11/13/24 with Jenny Cabrales NP.   Prescription approved per [facility] Refill Protocol.  Julie Behrendt RN

## 2024-11-05 DIAGNOSIS — E11.21 TYPE 2 DIABETES MELLITUS WITH DIABETIC NEPHROPATHY, WITHOUT LONG-TERM CURRENT USE OF INSULIN (H): ICD-10-CM

## 2024-11-07 RX ORDER — METFORMIN HYDROCHLORIDE 500 MG/1
1000 TABLET, EXTENDED RELEASE ORAL 2 TIMES DAILY WITH MEALS
Qty: 120 TABLET | Refills: 0 | Status: SHIPPED | OUTPATIENT
Start: 2024-11-07

## 2024-11-24 DIAGNOSIS — E78.5 HYPERLIPIDEMIA LDL GOAL <100: ICD-10-CM

## 2024-11-25 RX ORDER — ATORVASTATIN CALCIUM 10 MG/1
10 TABLET, FILM COATED ORAL DAILY
Qty: 90 TABLET | Refills: 3 | Status: SHIPPED | OUTPATIENT
Start: 2024-11-25

## 2025-01-04 ENCOUNTER — HEALTH MAINTENANCE LETTER (OUTPATIENT)
Age: 55
End: 2025-01-04

## 2025-01-06 DIAGNOSIS — I10 PRIMARY HYPERTENSION: ICD-10-CM

## 2025-01-07 RX ORDER — LISINOPRIL 20 MG/1
TABLET ORAL
Qty: 90 TABLET | Refills: 3 | OUTPATIENT
Start: 2025-01-07

## 2025-01-07 NOTE — TELEPHONE ENCOUNTER
Apt scheduled for 01/22/2025. Pt should have enough medication until then, if not will call for a refill.    June Mann MA on 1/7/2025 at 1:15 PM

## 2025-01-29 ENCOUNTER — OFFICE VISIT (OUTPATIENT)
Dept: FAMILY MEDICINE | Facility: CLINIC | Age: 55
End: 2025-01-29
Payer: COMMERCIAL

## 2025-01-29 VITALS
HEIGHT: 73 IN | WEIGHT: 217.2 LBS | HEART RATE: 71 BPM | OXYGEN SATURATION: 97 % | DIASTOLIC BLOOD PRESSURE: 78 MMHG | SYSTOLIC BLOOD PRESSURE: 132 MMHG | TEMPERATURE: 97.6 F | RESPIRATION RATE: 16 BRPM | BODY MASS INDEX: 28.79 KG/M2

## 2025-01-29 DIAGNOSIS — I10 PRIMARY HYPERTENSION: ICD-10-CM

## 2025-01-29 DIAGNOSIS — E78.5 HYPERLIPIDEMIA LDL GOAL <70: ICD-10-CM

## 2025-01-29 DIAGNOSIS — L40.8 OTHER PSORIASIS: ICD-10-CM

## 2025-01-29 DIAGNOSIS — T63.441D BEE STING REACTION, ACCIDENTAL OR UNINTENTIONAL, SUBSEQUENT ENCOUNTER: ICD-10-CM

## 2025-01-29 DIAGNOSIS — E11.21 TYPE 2 DIABETES MELLITUS WITH DIABETIC NEPHROPATHY, WITHOUT LONG-TERM CURRENT USE OF INSULIN (H): Primary | ICD-10-CM

## 2025-01-29 LAB
ALBUMIN SERPL BCG-MCNC: 4.6 G/DL (ref 3.5–5.2)
ALP SERPL-CCNC: 88 U/L (ref 40–150)
ALT SERPL W P-5'-P-CCNC: 61 U/L (ref 0–70)
ANION GAP SERPL CALCULATED.3IONS-SCNC: 10 MMOL/L (ref 7–15)
AST SERPL W P-5'-P-CCNC: 33 U/L (ref 0–45)
BILIRUB SERPL-MCNC: 0.8 MG/DL
BUN SERPL-MCNC: 13.5 MG/DL (ref 6–20)
CALCIUM SERPL-MCNC: 10 MG/DL (ref 8.8–10.4)
CHLORIDE SERPL-SCNC: 99 MMOL/L (ref 98–107)
CHOLEST SERPL-MCNC: 132 MG/DL
CREAT SERPL-MCNC: 0.78 MG/DL (ref 0.67–1.17)
CREAT UR-MCNC: 84.2 MG/DL
EGFRCR SERPLBLD CKD-EPI 2021: >90 ML/MIN/1.73M2
EST. AVERAGE GLUCOSE BLD GHB EST-MCNC: 212 MG/DL
FASTING STATUS PATIENT QL REPORTED: YES
FASTING STATUS PATIENT QL REPORTED: YES
GLUCOSE SERPL-MCNC: 261 MG/DL (ref 70–99)
HBA1C MFR BLD: 9 % (ref 0–5.6)
HCO3 SERPL-SCNC: 28 MMOL/L (ref 22–29)
HDLC SERPL-MCNC: 39 MG/DL
LDLC SERPL CALC-MCNC: 70 MG/DL
MICROALBUMIN UR-MCNC: <12 MG/L
MICROALBUMIN/CREAT UR: NORMAL MG/G{CREAT}
NONHDLC SERPL-MCNC: 93 MG/DL
POTASSIUM SERPL-SCNC: 4.6 MMOL/L (ref 3.4–5.3)
PROT SERPL-MCNC: 7.1 G/DL (ref 6.4–8.3)
SODIUM SERPL-SCNC: 137 MMOL/L (ref 135–145)
TRIGL SERPL-MCNC: 113 MG/DL

## 2025-01-29 PROCEDURE — 80053 COMPREHEN METABOLIC PANEL: CPT | Performed by: NURSE PRACTITIONER

## 2025-01-29 PROCEDURE — 83036 HEMOGLOBIN GLYCOSYLATED A1C: CPT | Performed by: NURSE PRACTITIONER

## 2025-01-29 PROCEDURE — 36415 COLL VENOUS BLD VENIPUNCTURE: CPT | Performed by: NURSE PRACTITIONER

## 2025-01-29 PROCEDURE — 99214 OFFICE O/P EST MOD 30 MIN: CPT | Performed by: NURSE PRACTITIONER

## 2025-01-29 PROCEDURE — 82607 VITAMIN B-12: CPT | Performed by: NURSE PRACTITIONER

## 2025-01-29 PROCEDURE — 82570 ASSAY OF URINE CREATININE: CPT | Performed by: NURSE PRACTITIONER

## 2025-01-29 PROCEDURE — 80061 LIPID PANEL: CPT | Performed by: NURSE PRACTITIONER

## 2025-01-29 PROCEDURE — G2211 COMPLEX E/M VISIT ADD ON: HCPCS | Performed by: NURSE PRACTITIONER

## 2025-01-29 PROCEDURE — 82043 UR ALBUMIN QUANTITATIVE: CPT | Performed by: NURSE PRACTITIONER

## 2025-01-29 RX ORDER — EPINEPHRINE 0.3 MG/.3ML
0.3 INJECTION SUBCUTANEOUS
Qty: 2 EACH | Refills: 0 | Status: SHIPPED | OUTPATIENT
Start: 2025-01-29

## 2025-01-29 RX ORDER — LISINOPRIL 20 MG/1
20 TABLET ORAL DAILY
Qty: 90 TABLET | Refills: 3 | Status: SHIPPED | OUTPATIENT
Start: 2025-01-29

## 2025-01-29 RX ORDER — METFORMIN HYDROCHLORIDE 500 MG/1
1000 TABLET, EXTENDED RELEASE ORAL 2 TIMES DAILY WITH MEALS
Qty: 360 TABLET | Refills: 1 | Status: SHIPPED | OUTPATIENT
Start: 2025-01-29

## 2025-01-29 RX ORDER — CALCIPOTRIENE 50 UG/G
OINTMENT TOPICAL
Qty: 120 G | Refills: 5 | Status: SHIPPED | OUTPATIENT
Start: 2025-01-29

## 2025-01-29 RX ORDER — GLIPIZIDE 10 MG/1
10 TABLET, FILM COATED, EXTENDED RELEASE ORAL DAILY
Qty: 90 TABLET | Refills: 1 | Status: SHIPPED | OUTPATIENT
Start: 2025-01-29

## 2025-01-29 RX ORDER — CLOBETASOL PROPIONATE 0.5 MG/G
OINTMENT TOPICAL DAILY
Qty: 120 G | Refills: 6 | Status: SHIPPED | OUTPATIENT
Start: 2025-01-29

## 2025-01-29 ASSESSMENT — PAIN SCALES - GENERAL: PAINLEVEL_OUTOF10: NO PAIN (0)

## 2025-01-29 NOTE — NURSING NOTE
"No chief complaint on file.      Initial There were no vitals taken for this visit. Estimated body mass index is 28.76 kg/m  as calculated from the following:    Height as of 11/30/23: 1.854 m (6' 1\").    Weight as of 11/30/23: 98.9 kg (218 lb).    Patient presents to the clinic using No DME    Is there anyone who you would like to be able to receive your results? No  If yes have patient fill out MARINO      "

## 2025-01-29 NOTE — PROGRESS NOTES
Assessment & Plan     Type 2 diabetes mellitus with diabetic nephropathy, without long-term current use of insulin (H)  Uncontrolled. Patient monitors blood glucose at home and the readings are frequently in the 200s. Plans to use CGM patient has at home. Hgb A1C 9.0. Metformin and Glipizide refills sent to the pharmacy.  Recommend adding either Jardiance or Januvia, written information provided on both medications and Vic will do some research and let me know what he decides.  Discussed lifestyle changes such as diet and exercise.  - Hemoglobin A1c; Future  - Albumin Random Urine Quantitative with Creat Ratio; Future  - Comprehensive metabolic panel; Future  - metFORMIN (GLUCOPHAGE XR) 500 MG 24 hr tablet; Take 2 tablets (1,000 mg) by mouth 2 times daily (with meals).  - Hemoglobin A1c  - Albumin Random Urine Quantitative with Creat Ratio  - Comprehensive metabolic panel    Other psoriasis  Controlled with current medications. Refill calcipotriene and clobetasol sent to the pharmacy.  - calcipotriene (DOVONOX) 0.005 % external ointment; Apply daily to affected areas on body in the evening.  - clobetasol (TEMOVATE) 0.05 % external ointment; Apply topically daily. Apply to Elbows, Legs, and Knees for Psoriasis in the morning.    Primary hypertension  Controlled. Discussed lifestyle changes such as diet and exercise. Refill of lisinopril sent to the pharmacy.   - Comprehensive metabolic panel; Future  - lisinopril (ZESTRIL) 20 MG tablet; Take 1 tablet (20 mg) by mouth daily.  - Comprehensive metabolic panel    Hyperlipidemia LDL goal <70  Discussed lifestyle changes such as diet and exercise. Labs pending.   - Lipid panel reflex to direct LDL Fasting; Future  - Comprehensive metabolic panel; Future  - Lipid panel reflex to direct LDL Fasting  - Comprehensive metabolic panel    Bee sting reaction, accidental or unintentional, subsequent encounter  Previous bee sting on the eyelid with facial swelling, EPI sent to the  "pharmacy as needed for allergic reaction  - EPINEPHrine (ANY BX GENERIC EQUIV) 0.3 MG/0.3ML injection 2-pack; Inject 0.3 mLs (0.3 mg) into the muscle once as needed for anaphylaxis.      BMI  Estimated body mass index is 28.79 kg/m  as calculated from the following:    Height as of this encounter: 1.85 m (6' 0.84\").    Weight as of this encounter: 98.5 kg (217 lb 3.2 oz).   Weight management plan: Discussed healthy diet and exercise guidelines    Work on weight loss  Regular exercise    Subjective   Vic is a 54 year old, presenting for the following health issues:  Diabetes, Hypertension, Chronic Disease Management, and Allergies        1/29/2025     9:32 AM   Additional Questions   Roomed by Sue BOWEN([facility])     History of Present Illness       Diabetes:   He presents for follow up of diabetes.  He is checking home blood glucose three times daily.   He checks blood glucose before and after meals.  Blood glucose is sometimes over 200 and never under 70. He is aware of hypoglycemia symptoms including shakiness and weakness.   He is concerned about blood sugar frequently over 200.    He is not experiencing numbness or burning in feet, excessive thirst, blurry vision, weight changes or redness, sores or blisters on feet. The patient has not had a diabetic eye exam in the last 12 months.          He eats 2-3 servings of fruits and vegetables daily.He consumes 0 sweetened beverage(s) daily.He exercises with enough effort to increase his heart rate 9 or less minutes per day.  He exercises with enough effort to increase his heart rate 3 or less days per week. He is missing 1 dose(s) of medications per week.  He is not taking prescribed medications regularly due to remembering to take.     Allergy - Epinephrine refill   Vaccines: Declined today     Hemoglobin A1C   Date Value Ref Range Status   11/30/2023 9.1 (H) 0.0 - 5.6 % Final     Comment:     Normal <5.7%   Prediabetes 5.7-6.4%    Diabetes 6.5% or higher     Note: Adopted " "from ADA consensus guidelines.   11/23/2020 7.4 (H) 0 - 5.6 % Final     Comment:     Normal <5.7% Prediabetes 5.7-6.4%  Diabetes 6.5% or higher - adopted from ADA   consensus guidelines.            Hypertension Follow-up    Do you check your blood pressure regularly outside of the clinic? Yes   Are you following a low salt diet? Yes  Are your blood pressures ever more than 140 on the top number (systolic) OR more   than 90 on the bottom number (diastolic), for example 140/90? No    BP Readings from Last 6 Encounters:   01/29/25 132/78   11/30/23 138/87   09/21/22 126/82   08/29/22 127/89   08/25/21 (!) 127/98   07/15/21 120/70       Chronic Kidney Disease Follow-up    Do you take any over the counter pain medicine?: No      Review of Systems  CONSTITUTIONAL: NEGATIVE for fever, chills, change in weight  INTEGUMENTARY/SKIN: NEGATIVE for worrisome rashes, moles or lesions  EYES: NEGATIVE for vision changes or irritation  ENT/MOUTH: NEGATIVE for ear, mouth and throat problems  RESP: NEGATIVE for significant cough or SOB  CV: NEGATIVE for chest pain, palpitations or peripheral edema  GI: NEGATIVE for nausea, abdominal pain, heartburn, or change in bowel habits  : NEGATIVE for frequency, dysuria, or hematuria  MUSCULOSKELETAL: NEGATIVE for significant arthralgias or myalgia  NEURO: NEGATIVE for weakness, dizziness or paresthesias  ENDOCRINE: NEGATIVE for temperature intolerance, skin/hair changes  PSYCHIATRIC: NEGATIVE for changes in mood or affect      Objective    /78   Pulse 71   Temp 97.6  F (36.4  C) (Tympanic)   Resp 16   Ht 1.85 m (6' 0.84\")   Wt 98.5 kg (217 lb 3.2 oz)   SpO2 97%   BMI 28.79 kg/m    Body mass index is 28.79 kg/m .  Physical Exam   GENERAL: alert and no distress  NECK: no adenopathy, no asymmetry, masses, or scars  RESP: lungs clear to auscultation - no rales, rhonchi or wheezes  CV: regular rate and rhythm, normal S1 S2, no S3 or S4, no murmur, click or rub, no peripheral " edema  ABDOMEN: soft, nontender, no hepatosplenomegaly, no masses and bowel sounds normal  MS: no gross musculoskeletal defects noted, no edema  Diabetic foot exam: normal DP and PT pulses, no trophic changes or ulcerative lesions, normal sensory exam, and normal monofilament exam    Physician Attestation   I, SHY Santa CNP, was present with the medical/MYRNA student who participated in the service and in the documentation of the note.  I have verified the history and personally performed the physical exam and medical decision making.  I agree with the assessment and plan of care as documented in the note.        SHY Santa CNP, ADRYAN student    Signed Electronically by: SHY Santa CNP

## 2025-01-30 LAB — VIT B12 SERPL-MCNC: 450 PG/ML (ref 232–1245)

## 2025-03-13 ENCOUNTER — TRANSFERRED RECORDS (OUTPATIENT)
Dept: MULTI SPECIALTY CLINIC | Facility: CLINIC | Age: 55
End: 2025-03-13

## 2025-03-13 LAB — RETINOPATHY: NORMAL

## 2025-04-04 ENCOUNTER — HOSPITAL ENCOUNTER (EMERGENCY)
Facility: CLINIC | Age: 55
Discharge: HOME | End: 2025-04-04
Attending: NURSE PRACTITIONER | Admitting: NURSE PRACTITIONER

## 2025-04-04 ENCOUNTER — APPOINTMENT (OUTPATIENT)
Dept: MRI IMAGING | Facility: CLINIC | Age: 55
End: 2025-04-04
Attending: NURSE PRACTITIONER

## 2025-04-04 ENCOUNTER — APPOINTMENT (OUTPATIENT)
Dept: GENERAL RADIOLOGY | Facility: CLINIC | Age: 55
End: 2025-04-04
Attending: NURSE PRACTITIONER

## 2025-04-04 VITALS
SYSTOLIC BLOOD PRESSURE: 146 MMHG | DIASTOLIC BLOOD PRESSURE: 86 MMHG | TEMPERATURE: 98.2 F | BODY MASS INDEX: 27.83 KG/M2 | WEIGHT: 210 LBS | OXYGEN SATURATION: 94 %

## 2025-04-04 DIAGNOSIS — S81.811A LACERATION OF RIGHT LOWER LEG, INITIAL ENCOUNTER: ICD-10-CM

## 2025-04-04 PROCEDURE — 73590 X-RAY EXAM OF LOWER LEG: CPT | Mod: RT

## 2025-04-04 PROCEDURE — 99283 EMERGENCY DEPT VISIT LOW MDM: CPT | Mod: 25 | Performed by: NURSE PRACTITIONER

## 2025-04-04 PROCEDURE — 90715 TDAP VACCINE 7 YRS/> IM: CPT | Performed by: NURSE PRACTITIONER

## 2025-04-04 PROCEDURE — 73718 MRI LOWER EXTREMITY W/O DYE: CPT | Mod: RT

## 2025-04-04 PROCEDURE — 250N000011 HC RX IP 250 OP 636: Performed by: NURSE PRACTITIONER

## 2025-04-04 PROCEDURE — 90471 IMMUNIZATION ADMIN: CPT | Performed by: NURSE PRACTITIONER

## 2025-04-04 PROCEDURE — 99284 EMERGENCY DEPT VISIT MOD MDM: CPT | Mod: 25 | Performed by: NURSE PRACTITIONER

## 2025-04-04 PROCEDURE — 12004 RPR S/N/AX/GEN/TRK7.6-12.5CM: CPT | Performed by: NURSE PRACTITIONER

## 2025-04-04 RX ADMIN — CLOSTRIDIUM TETANI TOXOID ANTIGEN (FORMALDEHYDE INACTIVATED), CORYNEBACTERIUM DIPHTHERIAE TOXOID ANTIGEN (FORMALDEHYDE INACTIVATED), BORDETELLA PERTUSSIS TOXOID ANTIGEN (GLUTARALDEHYDE INACTIVATED), BORDETELLA PERTUSSIS FILAMENTOUS HEMAGGLUTININ ANTIGEN (FORMALDEHYDE INACTIVATED), BORDETELLA PERTUSSIS PERTACTIN ANTIGEN, AND BORDETELLA PERTUSSIS FIMBRIAE 2/3 ANTIGEN 0.5 ML: 5; 2; 2.5; 5; 3; 5 INJECTION, SUSPENSION INTRAMUSCULAR at 13:17

## 2025-04-04 ASSESSMENT — ACTIVITIES OF DAILY LIVING (ADL)
ADLS_ACUITY_SCORE: 41

## 2025-04-04 NOTE — DISCHARGE INSTRUCTIONS
Your laceration was repaired with both sutures and Steri-Strips.  With the use of Steri-Strips the total length of the suture that was repaired with sutures was 7 cm and Steri-Strips were also applied to the remainder of the laceration.  I recommend leave this dressing on until Saturday evening then you can shower and remove the dressing then leave it open to air thereafter.  Watch for signs or symptoms of infection such as increasing pain, redness, swelling, purulent drainage.  Otherwise follow-up with your primary care office for a wound check and suture removal in approximately 10 days.  Your tetanus was updated today and does not need to be repeated for another 5 to 10 years.  An X-ray obtained to evaluate for possible fracture.

## 2025-04-04 NOTE — ED PROVIDER NOTES
History     Chief Complaint   Patient presents with    Laceration     HPI  Arvin Ta is a 54 year old male who presents emergency room with a right lower leg injury that occurred while patient was at work.  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  Patient denies loss of sensation distal to the injury.  He reports he is having moderate pain when you touch the injury but when it is at rest there is minimal to no pain.  He denies any other concerns today.     Allergies:  No Known Allergies    Problem List:    Patient Active Problem List    Diagnosis Date Noted    Essential hypertension with goal blood pressure less than 140/90 07/03/2019     Priority: Medium    CKD (chronic kidney disease) stage 1, GFR 90 ml/min or greater 12/14/2017     Priority: Medium    History of arthroplasty of left knee 12/29/2015     Priority: Medium    Type 2 diabetes mellitus with diabetic nephropathy (H) 06/22/2015     Priority: Medium     Diagnosis in June, 2013.       Hyperlipidemia LDL goal <100 10/31/2010     Priority: Medium    Hypersomnia with sleep apnea 08/29/2006     Priority: Medium     On CPAP January 2005.  6/24/2015:Uses CPAP every night.   11/23/2020:Uses CPAP nightly.         Other psoriasis 08/29/2006     Priority: Medium    Other tear of cartilage or meniscus of knee, current 08/29/2006     Priority: Medium     posterior medial on left          Past Medical History:    Past Medical History:   Diagnosis Date    Hypertension     Other psoriasis     Sleep apnea     Type 2 diabetes mellitus without complications (H)        Past Surgical History:    Past Surgical History:   Procedure Laterality Date    ARTHROPLASTY KNEE UNICOMPARTMENT Left 12/29/2015    Procedure: ARTHROPLASTY KNEE  UNICOMPARTMENT;  Surgeon: Lg Meyers MD;  Location: [facility]    COLONOSCOPY N/A 8/25/2021    Procedure: COLONOSCOPY, WITH POLYPECTOMY AND BIOPSY;  Surgeon: Lefty Alvarez DO;  Location: [facility]    ORTHOPEDIC SURGERY  2008    Knee    SOFT TISSUE SURGERY  1995    Shoulder    SOFT TISSUE SURGERY  1986    Elbow    SURGICAL HISTORY OF -   1994    (R) Shoulder    SURGICAL HISTORY OF -   1987 approx    Pins in elbow left       Family History:    Family History   Problem Relation Age of Onset    Diabetes Mother         type 2    Cancer Father 72        Hodgkins Lymphoma    Heart Disease Father         Stent at age 67    Prostate Cancer No family hx of     Colon Cancer No family hx of        Social History:  Marital Status:   [2]  Social History     Tobacco Use    Smoking status: Never    Smokeless tobacco: Never   Vaping Use    Vaping status: Never Used   Substance Use Topics    Alcohol use: Yes     Comment: occl    Drug use: No        Medications:    atorvastatin (LIPITOR) 10 MG tablet  blood glucose (NO BRAND SPECIFIED) lancets standard  blood glucose monitoring (NO BRAND SPECIFIED) meter device kit  blood glucose monitoring (NO BRAND SPECIFIED) test strip  calcipotriene (DOVONOX) 0.005 % external ointment  cetirizine (ZYRTEC) 10 MG tablet  clobetasol (TEMOVATE) 0.05 % external ointment  Continuous Blood Gluc  (FREESTYLE BONNIE 2 READER) SATISH  Continuous Blood Gluc Sensor (FREESTYLE BONNIE 14 DAY SENSOR) MISC  Continuous Blood Gluc Sensor (FREESTYLE BONNIE 2 SENSOR) MISC  EPINEPHrine (ANY BX GENERIC EQUIV) 0.3 MG/0.3ML injection 2-pack  glipiZIDE (GLUCOTROL XL) 10 MG 24 hr tablet  lisinopril (ZESTRIL) 20 MG tablet  metFORMIN (GLUCOPHAGE XR) 500 MG 24 hr tablet  metroNIDAZOLE (METROCREAM) 0.75 % external cream          Review of Systems  As mentioned above in the history present illness. All other systems were reviewed and are negative.    Physical Exam   BP: (!) 146/86  Temp: 98.2  F (36.8   C)  Weight: 95.3 kg (210 lb)  SpO2: 95 %      Physical Exam  BP (!) 146/86   Temp 98.2  F (36.8  C) (Oral)   Wt 95.3 kg (210 lb)   SpO2 94%   BMI 27.83 kg/m    General: alert and in no acute distress  Head: atraumatic, normocephalic  Abd: Soft, nontender, nondistended, no peritoneal signs  Musculoskel/Extremities: normal extremities, no edema, erythema, tenderness and full AROM of major joints without tenderness  Skin: Pt has 12 cm by 5 mm vertical laceration with partial skin (approximately 6 cm) without obvious muscle involvement.  See photo.  Pt has moderate pain with soft touch - concern for possible bone involvement.  Neuro: Patient awake, alert, oriented, speech is fluent, gait is normal  Psychiatric: affect/mood normal, cooperative, normal judgement/insight and memory intact    ED Course     ED Course as of 04/04/25 1735   Fri Apr 04, 2025   1708 MR Tibia Fibula Lower Leg Right wo Contrast  IMPRESSION:  1.  No fracture or stress reaction.  2.  Soft tissue edema and probable small laceration/soft tissue defect anteromedial distal leg. No fluid collection.       [facility]    -Laceration Repair    Date/Time: 4/4/2025 12:00 PM    Performed by: Kimberley Huizar APRN CNP  Authorized by: Kimberley Huizar APRN CNP    Emergent condition/consent implied      ANESTHESIA (see MAR for exact dosages):     Anesthesia method:  Local infiltration    Local anesthetic:  Bupivacaine 0.25% WITH epi  LACERATION DETAILS     Location:  Leg    Leg location:  R lower leg    Length (cm):  12    Depth (mm):  5    REPAIR TYPE:     Repair type:  Simple    EXPLORATION:     Wound exploration: wound explored through full range of motion and entire depth of wound probed and visualized      Wound extent: fascia not violated, no foreign body, no signs of injury and no tendon damage      Contaminated: no      TREATMENT:     Area cleansed with:  Hibiclens    Amount of cleaning:  Standard    Irrigation  solution:  Tap water    Irrigation method:  Tap    Visualized foreign bodies/material removed: no      SKIN REPAIR     Repair method:  Steri-Strips and sutures    Suture size:  3-0    Suture material:  Nylon    Suture technique:  Simple interrupted    Number of sutures:  7    Number of Steri-Strips:  6    APPROXIMATION     Approximation:  Loose    POST-PROCEDURE DETAILS     Dressing:  Antibiotic ointment, non-adherent dressing and adhesive bandage      PROCEDURE    Patient Tolerance:  Patient tolerated the procedure well with no immediate complications    Results for orders placed or performed during the hospital encounter of 04/04/25 (from the past 24 hours)   XR Tibia and Fibula Right 2 Views    Narrative    EXAM: XR TIBIA AND FIBULA RIGHT 2 VIEWS  LOCATION: [facility]  DATE: 4/4/2025    INDICATION: Trip fall over box   hit tib  fib with subsequent laceration, acute pain right anterior mid shin  COMPARISON: None.      Impression    IMPRESSION: There is some nonspecific cortical thickening along the posterior and medial tibial shaft. While this may be posttraumatic, the finding is indeterminate. Recommend MRI for further evaluation. There is some soft tissue swelling. No evidence of   a radiopaque foreign body or displaced fracture.    NOTE: ABNORMAL REPORT    THE DICTATION ABOVE DESCRIBES AN ABNORMALITY FOR WHICH FOLLOW-UP IS NEEDED.              MR Tibia Fibula Lower Leg Right wo Contrast    Narrative    EXAM: MR TIBIA FIBULA LOWER LEG RIGHT W/O CONTRAST  LOCATION: [facility]  DATE: 4/4/2025    INDICATION: right tib fib injury  concern for fracture  COMPARISON: Radiographs 04/04/2025  TECHNIQUE: Unenhanced.    FINDINGS:     BONES:   -No fracture, bone contusion, or stress reaction. The previously described cortical thickening correlates with benign cortical thickening along the insertion of the flexor digitorum longus.  -No concerning marrow replacing  lesion. Mild degenerative arthritis right knee. Left medial unicompartmental knee arthroplasty. Degenerative arthritis lateral compartment of the left knee with subchondral cystic change at the lateral tibial plateau.   Degenerative osteochondral cyst right medial talar dome.     SOFT TISSUES:    -Reticulated subcutaneous edema about the anteromedial aspect of the distal leg with probable small laceration/soft tissue defect with overlying dressing material. No fluid collection.    MUSCLES:  -No abnormality in the visualized musculature.      Impression    IMPRESSION:  1.  No fracture or stress reaction.  2.  Soft tissue edema and probable small laceration/soft tissue defect anteromedial distal leg. No fluid collection.         Medications   Tdap (tetanus-diphtheria-acell pertussis) (ADACEL) injection 0.5 mL (0.5 mLs Intramuscular $Given 4/4/25 1317)       Assessments & Plan (with Medical Decision Making)     I have reviewed the nursing notes.    I have reviewed the findings, diagnosis, plan and need for follow up with the patient.  54-year-old male presents emergency department for a right lower leg laceration and shin injury that occurred while patient was at work.  Laceration included skin tear as well.  Repair required sutures and Steri-Strips.  Also due to the location and the amount of bony type pain x-ray was obtained to evaluate for possible fracture.  X-ray was nondiagnostic and recommended an MRI.  MRI completed and reveals no acute bony involvement.  Reviewed this with patient.  Discussed wound care and recommend follow-up for wound check in approximately 8 days.  Discussed worrisome reasons to return.  Tetanus was updated today.  Patient was discharged in stable condition.  Was discharged prior to MRI radiology read due to his need for discharge.  He was phoned  with the MRI result.    Discharge Medication List as of 4/4/2025  4:17 PM          Final diagnoses:   Laceration of right lower leg, initial  encounter       4/4/2025   [facility] EMERGENCY DEPT       Kimberley Huizar, SHY CNP  04/04/25 3605

## 2025-04-09 ENCOUNTER — TELEPHONE (OUTPATIENT)
Dept: FAMILY MEDICINE | Facility: CLINIC | Age: 55
End: 2025-04-09

## 2025-04-09 ENCOUNTER — OFFICE VISIT (OUTPATIENT)
Dept: FAMILY MEDICINE | Facility: CLINIC | Age: 55
End: 2025-04-09

## 2025-04-09 VITALS
OXYGEN SATURATION: 97 % | RESPIRATION RATE: 18 BRPM | BODY MASS INDEX: 28.49 KG/M2 | HEIGHT: 73 IN | WEIGHT: 215 LBS | DIASTOLIC BLOOD PRESSURE: 79 MMHG | TEMPERATURE: 97.6 F | SYSTOLIC BLOOD PRESSURE: 128 MMHG | HEART RATE: 71 BPM

## 2025-04-09 DIAGNOSIS — L03.115 CELLULITIS OF RIGHT LOWER EXTREMITY: Primary | ICD-10-CM

## 2025-04-09 DIAGNOSIS — S81.811D LACERATION OF RIGHT LOWER LEG, SUBSEQUENT ENCOUNTER: ICD-10-CM

## 2025-04-09 PROCEDURE — 1125F AMNT PAIN NOTED PAIN PRSNT: CPT | Performed by: FAMILY MEDICINE

## 2025-04-09 PROCEDURE — 3074F SYST BP LT 130 MM HG: CPT | Performed by: FAMILY MEDICINE

## 2025-04-09 PROCEDURE — 99213 OFFICE O/P EST LOW 20 MIN: CPT | Performed by: FAMILY MEDICINE

## 2025-04-09 PROCEDURE — 3078F DIAST BP <80 MM HG: CPT | Performed by: FAMILY MEDICINE

## 2025-04-09 RX ORDER — CEFADROXIL 500 MG/1
500 CAPSULE ORAL 2 TIMES DAILY
Qty: 20 CAPSULE | Refills: 0 | Status: SHIPPED | OUTPATIENT
Start: 2025-04-09 | End: 2025-04-19

## 2025-04-09 ASSESSMENT — PAIN SCALES - GENERAL: PAINLEVEL_OUTOF10: MODERATE PAIN (4)

## 2025-04-09 NOTE — PROGRESS NOTES
"  Assessment & Plan     Laceration of right lower leg, subsequent encounter  Cellulitis of right lower extremity  Large laceration repaired on 4/4. Sutures and steri strips in place. More pain, mild erythema today. No purulent drainage. No evidence of abscess.   -- plan to treat for cellulitis with duricef 500 mg twice daily x 10 days.  Monitor for worsening symptoms, if so add coverage for MRSA. Advised keeping area clean. All questions answered.   - cefadroxil (DURICEF) 500 MG capsule; Take 1 capsule (500 mg) by mouth 2 times daily for 10 days.      The risks, benefits and treatment options of prescribed medications or other treatments have been discussed with the patient. The patient verbalized their understanding and should call or follow up if no improvement or if they develop further problems.      Roby Ho is a 54 year old, presenting for the following health issues:  Laceration (Evaulation- was seen in ED 4/4)        4/9/2025     2:22 PM   Additional Questions   Roomed by Michelle Garcia   Accompanied by self         4/9/2025     2:22 PM   Patient Reported Additional Medications   Patient reports taking the following new medications none     History of Present Illness       Reason for visit:  Check leg for infection  Symptom onset:  Today   He is taking medications regularly.    Patient was working today and noticed some pain, upon looking at it he noticed it was yellow and it had previously not been yellow, worried about an infection.    Leg injury on 4/4/2025  Large laceration.   Repaired with 7 sutures.   Having more pain  More swelling  No purulent drainage.   Slightly warm.   No fevers.   Has been active with work.   Chronic psoriasis         Objective    /79 (BP Location: Right arm, Patient Position: Sitting, Cuff Size: Adult Regular)   Pulse 71   Temp 97.6  F (36.4  C) (Oral)   Resp 18   Ht 1.854 m (6' 1\")   Wt 97.5 kg (215 lb)   SpO2 97%   BMI 28.37 kg/m    Body mass index is 28.37 " kg/m .  Physical Exam   General: alert, cooperative, no acute distress   Leg right: laceration with sutures and steri strips in place. Erythema surrounding, mild tenderness with palpation around the laceration. No purulent drainage. No evidence of abscess. Underlying psoriatic plaque present.           Signed Electronically by: Curt Sanderson DO

## 2025-04-09 NOTE — TELEPHONE ENCOUNTER
"Patient calling regarding right leg laceration that was repaired 4/4  States the leg is \"yellow\" in comparison to his other leg  The wound has become a little more painful recently  Does not think he has a fever  Denies pus like drainage    Scheduled for appointment today    Thuy Hernandez RN on 4/9/2025 at 12:01 PM    "

## 2025-04-19 ENCOUNTER — APPOINTMENT (OUTPATIENT)
Dept: ULTRASOUND IMAGING | Facility: CLINIC | Age: 55
End: 2025-04-19
Attending: STUDENT IN AN ORGANIZED HEALTH CARE EDUCATION/TRAINING PROGRAM
Payer: COMMERCIAL

## 2025-04-19 ENCOUNTER — HOSPITAL ENCOUNTER (EMERGENCY)
Facility: CLINIC | Age: 55
Discharge: HOME | End: 2025-04-19
Attending: STUDENT IN AN ORGANIZED HEALTH CARE EDUCATION/TRAINING PROGRAM | Admitting: STUDENT IN AN ORGANIZED HEALTH CARE EDUCATION/TRAINING PROGRAM
Payer: COMMERCIAL

## 2025-04-19 ENCOUNTER — TELEPHONE (OUTPATIENT)
Dept: NURSING | Facility: CLINIC | Age: 55
End: 2025-04-19
Payer: COMMERCIAL

## 2025-04-19 VITALS
DIASTOLIC BLOOD PRESSURE: 94 MMHG | HEART RATE: 66 BPM | TEMPERATURE: 97.9 F | BODY MASS INDEX: 28.99 KG/M2 | OXYGEN SATURATION: 98 % | WEIGHT: 214 LBS | SYSTOLIC BLOOD PRESSURE: 149 MMHG | HEIGHT: 72 IN | RESPIRATION RATE: 20 BRPM

## 2025-04-19 DIAGNOSIS — M79.89 RIGHT LEG SWELLING: ICD-10-CM

## 2025-04-19 DIAGNOSIS — Z51.89 VISIT FOR WOUND CHECK: ICD-10-CM

## 2025-04-19 PROCEDURE — 99283 EMERGENCY DEPT VISIT LOW MDM: CPT | Performed by: STUDENT IN AN ORGANIZED HEALTH CARE EDUCATION/TRAINING PROGRAM

## 2025-04-19 PROCEDURE — 93971 EXTREMITY STUDY: CPT | Mod: RT

## 2025-04-19 PROCEDURE — 99284 EMERGENCY DEPT VISIT MOD MDM: CPT | Mod: 25 | Performed by: STUDENT IN AN ORGANIZED HEALTH CARE EDUCATION/TRAINING PROGRAM

## 2025-04-19 ASSESSMENT — ACTIVITIES OF DAILY LIVING (ADL)
ADLS_ACUITY_SCORE: 41
ADLS_ACUITY_SCORE: 41

## 2025-04-19 ASSESSMENT — COLUMBIA-SUICIDE SEVERITY RATING SCALE - C-SSRS
1. IN THE PAST MONTH, HAVE YOU WISHED YOU WERE DEAD OR WISHED YOU COULD GO TO SLEEP AND NOT WAKE UP?: NO
6. HAVE YOU EVER DONE ANYTHING, STARTED TO DO ANYTHING, OR PREPARED TO DO ANYTHING TO END YOUR LIFE?: NO
2. HAVE YOU ACTUALLY HAD ANY THOUGHTS OF KILLING YOURSELF IN THE PAST MONTH?: NO

## 2025-04-19 NOTE — ED PROVIDER NOTES
History     Chief Complaint   Patient presents with    Wound Check     HPI  Arvin Ta is a 54 year old male who has hyperlipidemia, type 2 diabetes, chronic kidney disease, psoriasis, hypertension, who presents to the ER for evaluation of a right lower extremity wound.  Chart review shows that patient was seen in this ER on 4/4 after sustaining an injury to his right lower extremity.  He ended up getting stitches.  Review shows that has been following in the primary care clinic and those visits were reviewed.  His wound dehisced and there was concern for cellulitis and he was prescribed cefadroxil and Bactrim.  Patient was referred to wound clinic.  He has a follow-up with wound care scheduled on Monday.  Patient states that the wound seems to be doing well, but he noticed an enlarged vein on the medial aspect of his leg anyone to get checked out because he is never noticed this before.  He is concerned about a blood clot.  He is never had a blood clot before.  He is been compliant with his antibiotics.  He denies any increased redness or drainage.  No fever or chills.  No chest pain or trouble breathing.  No new falls or trauma    Allergies:  No Known Allergies    Problem List:    Patient Active Problem List    Diagnosis Date Noted    Essential hypertension with goal blood pressure less than 140/90 07/03/2019     Priority: Medium    CKD (chronic kidney disease) stage 1, GFR 90 ml/min or greater 12/14/2017     Priority: Medium    History of arthroplasty of left knee 12/29/2015     Priority: Medium    Type 2 diabetes mellitus with diabetic nephropathy (H) 06/22/2015     Priority: Medium     Diagnosis in June, 2013.       Hyperlipidemia LDL goal <100 10/31/2010     Priority: Medium    Hypersomnia with sleep apnea 08/29/2006     Priority: Medium     On CPAP January 2005.  6/24/2015:Uses CPAP every night.   11/23/2020:Uses CPAP nightly.         Other psoriasis 08/29/2006     Priority: Medium    Other tear of  cartilage or meniscus of knee, current 08/29/2006     Priority: Medium     posterior medial on left          Past Medical History:    Past Medical History:   Diagnosis Date    Hypertension     Other psoriasis     Sleep apnea     Type 2 diabetes mellitus without complications (H)        Past Surgical History:    Past Surgical History:   Procedure Laterality Date    ARTHROPLASTY KNEE UNICOMPARTMENT Left 12/29/2015    Procedure: ARTHROPLASTY KNEE UNICOMPARTMENT;  Surgeon: Lg Meyers MD;  Location: [facility]    COLONOSCOPY N/A 8/25/2021    Procedure: COLONOSCOPY, WITH POLYPECTOMY AND BIOPSY;  Surgeon: Lefty Alvarez DO;  Location: [facility]    ORTHOPEDIC SURGERY  2008    Knee    SOFT TISSUE SURGERY  1995    Shoulder    SOFT TISSUE SURGERY  1986    Elbow    SURGICAL HISTORY OF -   1994    (R) Shoulder    SURGICAL HISTORY OF -   1987 approx    Pins in elbow left       Family History:    Family History   Problem Relation Age of Onset    Diabetes Mother         type 2    Cancer Father 72        Hodgkins Lymphoma    Heart Disease Father         Stent at age 67    Prostate Cancer No family hx of     Colon Cancer No family hx of        Social History:  Marital Status:   [2]  Social History     Tobacco Use    Smoking status: Never    Smokeless tobacco: Never   Vaping Use    Vaping status: Never Used   Substance Use Topics    Alcohol use: Yes     Comment: occl    Drug use: No        Medications:    atorvastatin (LIPITOR) 10 MG tablet  blood glucose (NO BRAND SPECIFIED) lancets standard  blood glucose monitoring (NO BRAND SPECIFIED) meter device kit  blood glucose monitoring (NO BRAND SPECIFIED) test strip  calcipotriene (DOVONOX) 0.005 % external ointment  cefadroxil (DURICEF) 500 MG capsule  cetirizine (ZYRTEC) 10 MG tablet  clobetasol (TEMOVATE) 0.05 % external ointment  Continuous Blood Gluc  (FREESTYLE BONNIE 2 READER) SATISH  Continuous Blood Gluc Sensor ([employer] USA[employer]YLE BONNIE 14 DAY SENSOR)  MISC  Continuous Blood Gluc Sensor (FREESTYLE BONNIE 2 SENSOR) MISC  EPINEPHrine (ANY BX GENERIC EQUIV) 0.3 MG/0.3ML injection 2-pack  glipiZIDE (GLUCOTROL XL) 10 MG 24 hr tablet  lisinopril (ZESTRIL) 20 MG tablet  metFORMIN (GLUCOPHAGE XR) 500 MG 24 hr tablet  metroNIDAZOLE (METROCREAM) 0.75 % external cream  sulfamethoxazole-trimethoprim (BACTRIM DS) 800-160 MG tablet          Review of Systems  See HPI  Physical Exam   BP: (!) 149/94  Pulse: 66  Temp: 97.9  F (36.6  C)  Resp: 20  Height: 182.9 cm (6')  Weight: 97.1 kg (214 lb)  SpO2: 98 %      Physical Exam  BP (!) 149/94   Pulse 66   Temp 97.9  F (36.6  C) (Oral)   Resp 20   Ht 1.829 m (6')   Wt 97.1 kg (214 lb)   SpO2 98%   BMI 29.02 kg/m    General: alert, interactive, in no apparent distress  Head: atraumatic  Nose: no rhinorrhea or epistaxis  Ears: no external auditory canal discharge or bleeding.    Eyes: Sclera nonicteric. Conjunctiva noninjected.   Lungs: No increased work of breathing.  Clear to auscultation bilaterally.  CV: RRR, peripheral pulses palpable and symmetric  Extremities: Warm and well-perfused.  No right calf tenderness, erythema, warmth or swelling.  There is an engorged vein traversing up the medial aspect of the right calf and distal thigh  Skin: See picture below.  Wound does not have any purulent drainage.  There is no warmth.  Mild erythema that does not extend past the marked edge  Neuro: CN II-XII grossly intact, following commands, eval extremity    ED Course        Procedures             Critical Care time:  none             Results for orders placed or performed during the hospital encounter of 04/19/25 (from the past 24 hours)   US Lower Extremity Venous Duplex Right    Narrative    EXAM: US LOWER EXTREMITY VENOUS DUPLEX RIGHT  LOCATION: [facility]  DATE: 4/19/2025    INDICATION: Leg swelling  COMPARISON: None.  TECHNIQUE: Venous Duplex ultrasound of the right lower extremity with and without  compression, augmentation and duplex. Color flow and spectral Doppler with waveform analysis performed.    FINDINGS: Exam includes the common femoral, femoral, popliteal, and contralateral common femoral veins as well as segmentally visualized deep calf veins and greater saphenous vein.     RIGHT: No evidence of deep vein thrombosis. No superficial thrombophlebitis.       Impression    IMPRESSION:  1.  No evidence of right lower extremity deep venous thrombosis.       Medications - No data to display    Assessments & Plan (with Medical Decision Making)     I have reviewed the nursing notes.    I have reviewed the findings, diagnosis, plan and need for follow up with the patient.          Medical Decision Making  Arvin Ta is a 54 year old male who has hyperlipidemia, type 2 diabetes, chronic kidney disease, psoriasis, hypertension, who presents to the ER for evaluation of a right lower extremity wound.  Vital signs reviewed and reassuring.  Patient's wound actually appears to be improving.  There is no evidence of worsening infection.  He has an engorged vein on the medial aspect of his calf and distal thigh that he is concerned could be a blood clot.  He is not having any associated tenderness or swelling.  I think DVT is unlikely, but certainly possible, so ultrasound was obtained.  This is negative for DVT.  I reassured the patient of this.  I am not exactly sure what is causing the engorged vein, likely due to the healing leg.  I do not think any change in management is needed at this time.  Recommended he follow-up with wound care and his regular doctor as scheduled next week.  Instructed continue antibiotics.  Additional reassurance and anticipatory guidance discussed.  Return precaution discussed as well.        Discharge Medication List as of 4/19/2025  2:57 AM          Final diagnoses:   Right leg swelling   Visit for wound check       4/19/2025   [facility] EMERGENCY DEPT       Mouna  MD Kwame  04/19/25 0327

## 2025-04-19 NOTE — DISCHARGE INSTRUCTIONS
I think that your leg is slowly healing.  Your ultrasound did not show any blood clots.  I think that the vein you are saying is part of the healing process with your leg.  Continue all of your medications and instructions as provided by your primary doctor.  Make sure you go to your appointments next week.  Return if you develop any concerning or severe symptoms

## 2025-04-19 NOTE — TELEPHONE ENCOUNTER
"Nurse Triage SBAR    Is this a 2nd Level Triage? NO    Situation: Pt calling stating he is already in parking lot of ED [place] and is going into ED  Right chin 13 centimeter gash that has been stitched up  Has now opened up  Pt is concerned that he can see a large vein in the wound at the time of this call.    Background:   Seen in office on 4/18/25 for cellutitis of lower right leg  Has wound clinic appt on 4/21/25    Assessment:   Denies;  Fever (although has not taken temp)    Protocol Recommended Disposition:   Go to ED now  Pt verbalized understanding and states,.\"I am already in parking lot of ED    Does the patient meet one of the following criteria for ADS visit consideration? 16+ years old, with an [facility] PCP     Leonarda Li RN, Nurse Advisor 1:15 AM 4/19/2025  "

## 2025-04-19 NOTE — ED TRIAGE NOTES
Patient here for wound check and surrounding veins.  Patient had accident and ultimately had stitches to right shin on 4/04, patient had stitches removed on 4/16 and since wound has been opening up.  Wound bed reddened and wound edges NOT approximated.  Patient has appt with wound care nurse on Monday     Triage Assessment (Adult)       Row Name 04/19/25 0123          Triage Assessment    Airway WDL WDL        Respiratory WDL    Respiratory WDL WDL        Skin Circulation/Temperature WDL    Skin Circulation/Temperature WDL X  wound dehicense right lower extremity        Cardiac WDL    Cardiac WDL WDL        Peripheral/Neurovascular WDL    Peripheral Neurovascular WDL WDL        Cognitive/Neuro/Behavioral WDL    Cognitive/Neuro/Behavioral WDL WDL

## 2025-04-21 ENCOUNTER — OFFICE VISIT (OUTPATIENT)
Dept: WOUND CARE | Facility: CLINIC | Age: 55
End: 2025-04-21
Attending: FAMILY MEDICINE

## 2025-04-21 VITALS
RESPIRATION RATE: 18 BRPM | SYSTOLIC BLOOD PRESSURE: 109 MMHG | HEART RATE: 80 BPM | DIASTOLIC BLOOD PRESSURE: 72 MMHG | OXYGEN SATURATION: 97 %

## 2025-04-21 DIAGNOSIS — L03.115 CELLULITIS OF RIGHT LOWER EXTREMITY: ICD-10-CM

## 2025-04-21 DIAGNOSIS — T81.30XA WOUND DEHISCENCE: ICD-10-CM

## 2025-04-21 DIAGNOSIS — T81.33XA TRAUMATIC WOUND DEHISCENCE, INITIAL ENCOUNTER: Primary | ICD-10-CM

## 2025-04-21 PROCEDURE — 99215 OFFICE O/P EST HI 40 MIN: CPT

## 2025-04-21 PROCEDURE — 99204 OFFICE O/P NEW MOD 45 MIN: CPT | Mod: 25

## 2025-04-21 PROCEDURE — 3074F SYST BP LT 130 MM HG: CPT

## 2025-04-21 PROCEDURE — 3078F DIAST BP <80 MM HG: CPT

## 2025-04-21 PROCEDURE — 11045 DBRDMT SUBQ TISS EACH ADDL: CPT

## 2025-04-21 PROCEDURE — 11042 DBRDMT SUBQ TIS 1ST 20SQCM/<: CPT

## 2025-04-21 RX ORDER — LIDOCAINE 50 MG/G
OINTMENT TOPICAL DAILY PRN
OUTPATIENT
Start: 2025-04-21

## 2025-04-21 NOTE — PROGRESS NOTES
Wound Clinic Note          Visit date: 04/21/2025       Cheif Complaint:     Arvin Ta is a 54 year old  male had concerns including Wound Check.        Treatment Plan:    1. Treatment: RLE wound treatment will include irrigation and dressings to promote autolytic debridement which will include: Vashe, hydrofera blue, ABD and rolled gauze  changed by pt every other days    If for some reason the patient is not able to get your dressing(s) changed as outlined above (due to illness, lack of supplies, lack of help) please do the following: remove old, soiled dressings; wash the ulcers with saline; pat dry; apply ABD pad or other absorbant pad and secure with rolled gauze; avoid tape directly on your skin; Patient instructed to call the clinic as soon as possible to let us know what the current issues are in receiving ulcer care.    2. Edema. Elevate your legs above your heart for 20-30 minutes twice daily.     3. Compression- tubi    Apply compression stockings in the morning right before getting out of bed.    Remove them at night.    If has tinglining and numbness on lower leg after compression application remove the wraps.     If a 2 layer or 4 layer compression wrap is being used; these are safe to have on for ONLY 7 days. If for some reason the patient is not able to get the wrap(s) changed (due to illness; lack of supplies, lack of help, lack of transportation) please do the following: unwrap the old 2 or 4 layer compression wrap; avoid using scissors as you could cut your skin and cause ulcers; use tubular compression when available. Call to reschedule your home care or clinic visit appointment as soon as possible.    4. Offloading: no direct pressure over the wound.    5. Nutrition: Focus on Protein diet unless on renal diet. Consider protein supplements. Monitor blood glucose and diabetic diet.    6. Imaging: ordered Venous competency test and Arterial duplex BL    7. Referral- Continue to follow  with primary for BG management    8. Wound Etiology: traumatic    9.  Education: importance BG management, keeping the wound covered   Separate from the wound care instructions we then discussed management strategies for lower extremity edema.  I explained the keys for managing lower extremity edema are compression and elevation.  I explained to the patient today that controlling the edema is probably the most important thing we can do to help heal the wound.  I have specifically recommended that they lay down with their legs above the level of the heart for 30 minutes at least twice a day.     I have explained to the patient the importance of protein intake to wound healing.  I have explained that increasing protein intake will speed wound healing.  We discussed several types of food that are high in protein and the wound care nurse gave the patient a handout that summarizes this information.  In addition to further speed wound healing I have encouraged the patient to take a protein supplement.       Patient to return to clinic in 1 week(s) for re-evaluation. They were instructed to call the clinic sooner with any further questions or concerns. Answered all questions.       HISTORY OF PRESENT ILLNESS:    Arvin is being seen at [facility] today regarding RLE. They arrive to the clinic today alone. The patient reports that the ulcer has been present since 4/4 and was sutured then dehisced, sutures were removed on 4/16/25.  The wound began due to the area being bumped.  At work  Was currently/previously using telfa. There has been Moderate drainage from the wound. Reports pain of 1/10;  and has remained about the same recently. Currently using advil for pain. Has used nothing as compression in the past, is currently using nothing for compression. Denies any fevers, chills, or generalized ill feeling. Denies history of cancer. Sleeps in a bed with legs elevated. Denies history of DVT, Cellulitis, and  Vein Procedures. Positive history of Joint Replacement. L) knee replacement ~ 9 yrs. I personally reviewed outside imaging, lab work, and progress noted through Care Everywhere and outside records.    Today the patient reports maintaining a regular diet without special attention to protein.        The patient denies a history of smoking or chronic steroid use.  The patient confirms having diabetes and reports the blood sugars are above 200 at least once a day.  The patient reports taking their diabetes medications as ordered.  The patient reports not taking the diabetes medications as ordered.         The patient has recently had some symptoms of wound infection and is currently taking antibiotics which they have been tolerating well with no symptoms of an adverse reaction.       Previous imagings: 4/19- NO DVT    Problem List:   Past Medical History:   Diagnosis Date    Hypertension     Other psoriasis     Sleep apnea     Type 2 diabetes mellitus without complications (H)     Diabetes mellitus              Family Hx: family history includes Cancer (age of onset: 72) in his father; Diabetes in his mother; Heart Disease in his father.       Surgical Hx:   Past Surgical History:   Procedure Laterality Date    ARTHROPLASTY KNEE UNICOMPARTMENT Left 12/29/2015    Procedure: ARTHROPLASTY KNEE UNICOMPARTMENT;  Surgeon: Lg Meyers MD;  Location:  OR    COLONOSCOPY N/A 8/25/2021    Procedure: COLONOSCOPY, WITH POLYPECTOMY AND BIOPSY;  Surgeon: Lefty Alvarez DO;  Location: [facility]    ORTHOPEDIC SURGERY  2008    Knee    SOFT TISSUE SURGERY  1995    Shoulder    SOFT TISSUE SURGERY  1986    Elbow    SURGICAL HISTORY OF -   1994    (R) Shoulder    SURGICAL HISTORY OF -   1987 approx    Pins in elbow left          Allergies:  No Known Allergies           Medication History:    Current Outpatient Medications   Medication Sig Dispense Refill    atorvastatin (LIPITOR) 10 MG tablet TAKE 1 TABLET DAILY 90 tablet 3     cefadroxil (DURICEF) 500 MG capsule Take 1 capsule (500 mg) by mouth 2 times daily for 7 days. 14 capsule 0    cetirizine (ZYRTEC) 10 MG tablet Take 10 mg by mouth daily as needed for allergies (Summer Months (hayfever))  30 tablet 1    Continuous Blood Gluc Sensor (FREESTYLE BONNIE 2 SENSOR) MISC Change every 14 days. 2 each 5    glipiZIDE (GLUCOTROL XL) 10 MG 24 hr tablet Take 1 tablet (10 mg) by mouth daily. 90 tablet 1    lisinopril (ZESTRIL) 20 MG tablet Take 1 tablet (20 mg) by mouth daily. 90 tablet 3    metFORMIN (GLUCOPHAGE XR) 500 MG 24 hr tablet Take 2 tablets (1,000 mg) by mouth 2 times daily (with meals). 360 tablet 1    sulfamethoxazole-trimethoprim (BACTRIM DS) 800-160 MG tablet Take 1 tablet by mouth 2 times daily for 7 days. 14 tablet 0    blood glucose (NO BRAND SPECIFIED) lancets standard Use to test blood sugar 1 times daily or as directed. (Patient not taking: Reported on 4/21/2025) 100 each 11    blood glucose monitoring (NO BRAND SPECIFIED) meter device kit Use to test blood sugar 1 times daily or as directed. (Patient not taking: Reported on 4/21/2025) 1 kit 0    blood glucose monitoring (NO BRAND SPECIFIED) test strip Use to test blood sugars 1 times daily or as directed (Patient not taking: Reported on 4/21/2025) 100 strip 11    calcipotriene (DOVONOX) 0.005 % external ointment Apply daily to affected areas on body in the evening. (Patient not taking: Reported on 4/16/2025) 120 g 5    clobetasol (TEMOVATE) 0.05 % external ointment Apply topically daily. Apply to Elbows, Legs, and Knees for Psoriasis in the morning. (Patient not taking: Reported on 4/16/2025) 120 g 6    Continuous Blood Gluc  (FREESTYLE BONNIE 2 READER) SATISH Use to read blood sugars as per 's instructions. (Patient not taking: Reported on 4/16/2025) 1 each 0    Continuous Blood Gluc Sensor (FREESTYLE BONNIE 14 DAY SENSOR) MISC Change every 14 days. (Patient not taking: Reported on 4/16/2025) 2 each 5     EPINEPHrine (ANY BX GENERIC EQUIV) 0.3 MG/0.3ML injection 2-pack Inject 0.3 mLs (0.3 mg) into the muscle once as needed for anaphylaxis. (Patient not taking: Reported on 4/16/2025) 2 each 0    metroNIDAZOLE (METROCREAM) 0.75 % external cream Apply twice daily to face. (Patient not taking: Reported on 1/29/2025) 45 g 5     Current Facility-Administered Medications   Medication Dose Route Frequency Provider Last Rate Last Admin    lidocaine (XYLOCAINE) 5 % ointment   Topical Daily PRN Yolande Chavez, [facility] CNP             Tobacco History:  reports that he has never smoked. He has never used smokeless tobacco.       REVIEW OF SYMPTOMS:   The review of systems was negative except as noted in the HPI.           PHYSICAL EXAMINATION:     /72   Pulse 80   Resp 18   SpO2 97%            GENERAL: The patient overall appears well and is no acute distress.   HEAD: normocephalic   EYES: Sclera and conjunctiva clear   NECK: no obvious masses   LUNGS: breathing is unlabored.   EXTREMITIES: No clubbing, cyanosis, edema   SKIN: No rashes or other abnormalities except as noted under the Wound section below.   NEUROLOGICAL: normal motor and sensory function       Peripheral Vascular: weak dorsalis pedis, non palpable posterior tibial pulses to RLE , using a handheld doppler these were biphasic in nature.  Good capillary refill. No unusual venous distention. Negative for spider veins, telangiectasias, hemosiderin deposition or hyperpigmentation, and fibrosis or scarring  Positive for  psoriasis patches        Also see below for wound details:     Circumferential volume measures:            4/21/2025    12:00 PM   Circumferential Measures   Right just above MTP 23.6   Right Ankle 25   Right Widest Calf 36.4     Impression:    Encounter Diagnoses   Name Primary?    Cellulitis of right lower extremity     Wound dehiscence          Wound location: RLE        Last photo: 4/21  Wound due to: Trauma  Wound history/plan of  care: Trauma at work, evolving.  Wound base: 90/10 % slough and old blood and red non granular tissue, Full thickness,      Palpation of the wound bed: firm and fluctuance      Drainage: moderate     Description of drainage: serosanguinous     Measurements (length x width x depth, in cm): 12  x 3  x  0.8 cm      Tunneling: N/A     Undermining: N/A  Periwound skin: Intact, Edematous, and Erythema- blanchable      Color: pale and pink      Temperature: normal   Odor: none  Pain: moderate, aching with debridement  Pain interventions prior to dressing change: patient tolerated well, topical lidocaine, and slow and gentle cares   STATUS: initial assessment and evolving      Ulceration(s)/Wound(s):   Please see the media tab under the chart review for pictures of the wounds.  Nursing staff removed dressings and cleansed wound.    VASC Wound right leg (Active)   Pre Size Length 12 04/21/25 1200   Pre Size Width 4 04/21/25 1200   Pre Total Sq cm 48 04/21/25 1200             Recent Labs   Lab Test 01/29/25  1007 11/30/23  0816 09/21/22  1050   A1C 9.0* 9.1* 7.6*          Recent Labs   Lab Test 01/29/25  1007 11/30/23  0816 08/29/22  1557   ALBUMIN 4.6 4.8 4.3         WBC   Date Value Ref Range Status   12/22/2015 6.2 4.0 - 11.0 10e9/L Final     WBC Count   Date Value Ref Range Status   11/30/2023 4.1 4.0 - 11.0 10e3/uL Final     Albumin   Date Value Ref Range Status   01/29/2025 4.6 3.5 - 5.2 g/dL Final   08/29/2022 4.3 3.4 - 5.0 g/dL Final   12/22/2015 4.2 3.4 - 5.0 g/dL Final                 ASSESSMENT:   This is a 54 year old  male with traumatic wound on RLE, evolving.         Procedure:         1. Debridement: After discussion of risk factors and verbal consent was obtained 2% Lidocaine HCL jelly was applied, under clean conditions, the RLE ulceration(s) were debrided using currette. Devitalized and nonviable tissue, along with any fibrin and slough, was removed to improve granulation tissue formation, stimulate wound  healing, decrease overall bacteria load, disrupt biofilm formation and decrease edge senescence.  Total excisional debridement was 36 sq cm from the epidermis/dermis area and into the subcutaneous tissue with a depth of 0.3 cm.   Ulcers were improved afterwards and .  Measures were as noted on the flow sheet and unchanged after debridement.            SHY Brink, FNP-C, CWOCN  04/21/2025   1:23 PM   [facility]  [phone]

## 2025-04-21 NOTE — LETTER
[facility]  [address] [place] [address] [address]  [place], [place] [address]  [phone]      Fax [phone]    [facility] HOME MEDICAL SUPPLY            FAX: [phone]              Customer Service: [phone]    Wound Dressing Rx and Order Form  Order Status: New  Verbal: ANTHONY Delgadillo   Date: 2025     Arvin Ta  Gender: male  : 1970  [id number] [facility] [id number] [phone] (home) [phone] (work)    Medical Record: [id number]  Primary Care Provider: [facility]      ICD-10-CM    1. Traumatic wound dehiscence, initial encounter  T81.33XA DEBRIDE SKIN/SUBQ TISSUE     VA DEBRIDEMENT,SUB-Q TISSUE, EA ADDL 20 SQ CM      2. Cellulitis of right lower extremity  L03.115 Wound Care Referral     DEBRIDE SKIN/SUBQ TISSUE     VA DEBRIDEMENT,SUB-Q TISSUE, EA ADDL 20 SQ CM      3. Wound dehiscence  T81.30XA Wound Care Referral     lidocaine (XYLOCAINE) 5 % ointment     DEBRIDE SKIN/SUBQ TISSUE     VA DEBRIDEMENT,SUB-Q TISSUE, EA ADDL 20 SQ CM            Insurance Info:  INSURER: No coverage found.    Policy ID#:  [id number]  SECONDARY INSURANCE:    Secondary Policy ID#:  N/A        Physician Info:   Name:  LETY BOSTON     Dept Address/Phones:   [address] [place] [address], [address]  [facility] [id number] [phone]  Fax: [phone]    Lymphedema circumferential measurements (in cm):      2025    12:00 PM   Circumferential Measures   Right just above MTP 23.6   Right Ankle 25   Right Widest Calf 36.4         Wound info:  VASC Wound right leg (Active)   Pre Size Length 12 25 1200   Pre Size Width 4 25 1200   Pre Total Sq cm 48 25 1200   Number of days: 0        Drainage: moderate  Thickness:  full  Duration of Need: 30 DAYS  Days Supply: 30 DAYS  Start Date: 2025  Starter Kit, Ancillary Kit (saline, gloves, gauze): Yes   Qualifying wound/Debridement: Yes     DISPENSE AS WRITTEN.   Call  "[phone]. Please call patient for out-of-pocket costs and options.      Dressing Type Brand Size Frequency of change  Quantity   Primary Hydrofera blue ready transfer    4\" x 4\" EVERY OTHER DAY and as needed 5 sheets   Secondary ABD pads      5\" x 9\" EVERY OTHER DAY and as needed     Sof Form Roll guaze     4\" x 75\" EVERY OTHER DAY and as needed    tape Paper tape  1\"   2 rolls         DISPENSE AS WRITTEN. Call [phone] with questions.       Wound Care Instructions    EVERY OTHER DAY and as needed, Cleanse your right leg wound(s) with vashe.    Pat Dry with non-sterile gauze    Apply Lotion to the intact skin surrounding your wound and other dry skin locations. If using an adhesive dressing avoid using lotion near the adhesive.   Some good lotions include: Remedy Skin Repair Cream, Sarna, Vanicream or Cetaphil    Primary Dressing: Apply hydrofera blue into/onto the wounds (If sticking to the wound when you are removing use saline to loosen)    Secondary dressing: Cover with ABD     Secure with non-sterile roll gauze (4\" x 75\" roll) and tape (1\" roll tape) as needed; avoid adhesive directly on the skin      OK to forward to covered supplier.    Electronically Signed Physician:  LETY BOSTON             Date: April 21, 2025    "

## 2025-04-21 NOTE — PROGRESS NOTES
Clinic Administered Medication Documentation    Patient was given Lidocaine 5% prior to wound debridement. Prior to medication administration, verified patient's identity using patient's name and date of birth.    Leona Chapman RN      Compression Applied to Right  Tubigrip Size F

## 2025-04-21 NOTE — PATIENT INSTRUCTIONS
"Wound care supplies were ordered today through [facility] and if you are not receiving your supplies or have a question on your bill please contact [facility] customer service at [phone]. Please allow 2-5 business days for delivery of supplies. You may get a call from a 1-800 # if there are additional information [facility] needs. It is important to  or return their call.         Wound Care Instructions    EVERY OTHER DAY and as needed, Cleanse your right leg wound(s) with vashe.    Pat Dry with non-sterile gauze    Apply Lotion to the intact skin surrounding your wound and other dry skin locations. If using an adhesive dressing avoid using lotion near the adhesive.   Some good lotions include: Remedy Skin Repair Cream, Sarna, Vanicream or Cetaphil    Primary Dressing: Apply hydrofera blue into/onto the wounds (If sticking to the wound when you are removing use saline to loosen)    Secondary dressing: Cover with ABD     Secure with non-sterile roll gauze (4\" x 75\" roll) and tape (1\" roll tape) as needed; avoid adhesive directly on the skin    Compression: tubular compression     It is not ok to get your wound wet in the bath or shower    SEEK MEDICAL CARE IF:  You have an increase in swelling, pain, or redness around the wound.  You have an increase in the amount of pus coming from the wound.  There is a bad smell coming from the wound.  The wound appears to be worsening/enlarging  You have a fever greater than 101.5 F      It is ok to continue current wound care treatment/products for the next 2-3 days until new wound care supplies are ordered and arrive. If longer than this please contact our office at [phone].    If for some reason you are not able to get your dressing(s) changed as outlined above (due to illness, lack of supplies, lack of help) please do the following: remove old, soiled dressings; wash the wounds with saline; pat dry; apply ABD pad or other absorbant pad and secure with rolled gauze; avoid " tape directly on your skin; Call the clinic as soon as possible to let us know what the current issues are in receiving wound care [phone].    If you have a 2 layer or 4 layer compression wrap on these are safe to have on for ONLY 7 days. If for some reason you are not able to get the wrap(s) changed (due to illness; lack of supplies, lack of help, lack of transportation) please do the following: unwrap the old 2 or 4 layer compression wrap; avoid using scissors as you could cut your skin and cause wounds; use tubular compression when available. Call to reschedule your home care or clinic visit appointment as soon as possible.    It is recommended that you elevate your legs throughout the day: approx 2-3 times each day  Elevate them above the level of your heart for 30 min.   Ways to do this:   Lay on the couch or your bed and prop your legs up on pillows   Recline back as far as you can go in your recliner and prop your legs on pillows.     Doing these things will help reduce the edema in your legs.      High Protein Foods  When you have an open ulcer, your bodies protein needs are much higher, so it is recommended eat good sources of protein or take a protein supplement!    Protein Supplements  -Premier Protein  -Ensure  -Boost  -Glucerna, if diabetic    Chicken  -Chicken breast, 3.5oz.-30 grams protein  -Chicken meat, cooked, 4 oz.    Beef  -Hamburger abby, 4 oz-28 grams protein  -Steak, 6 oz-42 grams  -Most cuts of beef- 7 grams of protein per ounce    Fish  -Most fish fillets or steaks are about 22 grams of protein for 3 1/2 oz(100 grams) of cooked fish, or 6 grams per ounce  -Tuna, 6 oz can-40 grams of protein    Pork  -Pork chop, average-22 grams protein  -Pork loin or tenderloin, 4 oz.-29 grams  -Ham, 3oz serving- 19 grams  -Wagner, 1 slice-3 grams    Eggs and Dairy  -Egg, large-7 grams  -Milk, 1 cup-8 grams  -Cottage cheese, 1/2 cup-15 grams  -Greek yogurt, 1 cup-usually 8-12 grams, check  label    Beans  -Soy milk, 1 cup-6-10 grams  -Most beans(black, wright, lentils, etc.) about 7-10 grams protein per half cup of cooked beans    Nuts and Seeds  -Peanut butter, 2 Tablespoons- 8 grams protein  -Almonds, 1/4 cup- 8 grams  -Peanuts, 1/4 cup-9 grams  -Sunflower seeds, 1/4 cup- 6 grams      Please NOTE: if you are 15 minutes late to your clinic appointment you will have to be rescheduled. Please call our clinic as soon as possible if you know you will not be able to get to your appointment at [phone].  If you fail to show up to 3 scheduled clinic appointments you will be dismissed from our clinic      We want to hear from you!  In the next few weeks, you should receive a call or email to complete a survey about your visit at [facility]. Please help us improve your appointment experience by letting us know how we did today. We strive to make your experience good and value any ways in which we could do better.      We value your input and suggestions.    Thank you for choosing the [facility]!

## 2025-04-28 ENCOUNTER — OFFICE VISIT (OUTPATIENT)
Dept: WOUND CARE | Facility: CLINIC | Age: 55
End: 2025-04-28

## 2025-04-28 VITALS
HEART RATE: 80 BPM | DIASTOLIC BLOOD PRESSURE: 80 MMHG | RESPIRATION RATE: 20 BRPM | OXYGEN SATURATION: 97 % | SYSTOLIC BLOOD PRESSURE: 119 MMHG

## 2025-04-28 DIAGNOSIS — T81.33XD TRAUMATIC WOUND DEHISCENCE, SUBSEQUENT ENCOUNTER: ICD-10-CM

## 2025-04-28 DIAGNOSIS — T81.30XA WOUND DEHISCENCE: Primary | ICD-10-CM

## 2025-04-28 PROCEDURE — 1126F AMNT PAIN NOTED NONE PRSNT: CPT

## 2025-04-28 PROCEDURE — 11042 DBRDMT SUBQ TIS 1ST 20SQCM/<: CPT

## 2025-04-28 PROCEDURE — 3079F DIAST BP 80-89 MM HG: CPT

## 2025-04-28 PROCEDURE — 3074F SYST BP LT 130 MM HG: CPT

## 2025-04-28 RX ADMIN — LIDOCAINE: 50 OINTMENT TOPICAL at 08:20

## 2025-04-28 ASSESSMENT — PAIN SCALES - GENERAL: PAINLEVEL_OUTOF10: NO PAIN (0)

## 2025-04-28 NOTE — PATIENT INSTRUCTIONS
"Wound care supplies were not ordered or needed today.      Wound Care Instructions    EVERY OTHER DAY and as needed, Cleanse your right leg wound(s) with vashe.    Pat Dry with non-sterile gauze    Apply Lotion to the intact skin surrounding your wound and other dry skin locations. If using an adhesive dressing avoid using lotion near the adhesive.   Some good lotions include: Remedy Skin Repair Cream, Sarna, Vanicream or Cetaphil    Primary Dressing: Apply hydrofera blue into/onto the wounds (If sticking to the wound when you are removing use saline to loosen). Gently pack where there is undermining.    Secondary dressing: Cover with ABD     Secure with non-sterile roll gauze (4\" x 75\" roll) and tape (1\" roll tape) as needed; avoid adhesive directly on the skin    Compression: tubular compression     It is not ok to get your wound wet in the bath or shower    SEEK MEDICAL CARE IF:  You have an increase in swelling, pain, or redness around the wound.  You have an increase in the amount of pus coming from the wound.  There is a bad smell coming from the wound.  The wound appears to be worsening/enlarging  You have a fever greater than 101.5 F      It is ok to continue current wound care treatment/products for the next 2-3 days until new wound care supplies are ordered and arrive. If longer than this please contact our office at [phone].    If for some reason you are not able to get your dressing(s) changed as outlined above (due to illness, lack of supplies, lack of help) please do the following: remove old, soiled dressings; wash the wounds with saline; pat dry; apply ABD pad or other absorbant pad and secure with rolled gauze; avoid tape directly on your skin; Call the clinic as soon as possible to let us know what the current issues are in receiving wound care [phone].    If you have a 2 layer or 4 layer compression wrap on these are safe to have on for ONLY 7 days. If for some reason you are not able to " get the wrap(s) changed (due to illness; lack of supplies, lack of help, lack of transportation) please do the following: unwrap the old 2 or 4 layer compression wrap; avoid using scissors as you could cut your skin and cause wounds; use tubular compression when available. Call to reschedule your home care or clinic visit appointment as soon as possible.    It is recommended that you elevate your legs throughout the day: approx 2-3 times each day  Elevate them above the level of your heart for 30 min.   Ways to do this:   Lay on the couch or your bed and prop your legs up on pillows   Recline back as far as you can go in your recliner and prop your legs on pillows.     Doing these things will help reduce the edema in your legs.      High Protein Foods  When you have an open ulcer, your bodies protein needs are much higher, so it is recommended eat good sources of protein or take a protein supplement!    Protein Supplements  -Premier Protein  -Ensure  -Boost  -Glucerna, if diabetic    Chicken  -Chicken breast, 3.5oz.-30 grams protein  -Chicken meat, cooked, 4 oz.    Beef  -Hamburger abby, 4 oz-28 grams protein  -Steak, 6 oz-42 grams  -Most cuts of beef- 7 grams of protein per ounce    Fish  -Most fish fillets or steaks are about 22 grams of protein for 3 1/2 oz(100 grams) of cooked fish, or 6 grams per ounce  -Tuna, 6 oz can-40 grams of protein    Pork  -Pork chop, average-22 grams protein  -Pork loin or tenderloin, 4 oz.-29 grams  -Ham, 3oz serving- 19 grams  -Wagner, 1 slice-3 grams    Eggs and Dairy  -Egg, large-7 grams  -Milk, 1 cup-8 grams  -Cottage cheese, 1/2 cup-15 grams  -Greek yogurt, 1 cup-usually 8-12 grams, check label    Beans  -Soy milk, 1 cup-6-10 grams  -Most beans(black, wright, lentils, etc.) about 7-10 grams protein per half cup of cooked beans    Nuts and Seeds  -Peanut butter, 2 Tablespoons- 8 grams protein  -Almonds, 1/4 cup- 8 grams  -Peanuts, 1/4 cup-9 grams  -Sunflower seeds, 1/4 cup- 6  grams      Please NOTE: if you are 15 minutes late to your clinic appointment you will have to be rescheduled. Please call our clinic as soon as possible if you know you will not be able to get to your appointment at [phone].  If you fail to show up to 3 scheduled clinic appointments you will be dismissed from our clinic      We want to hear from you!  In the next few weeks, you should receive a call or email to complete a survey about your visit at [facility]. Please help us improve your appointment experience by letting us know how we did today. We strive to make your experience good and value any ways in which we could do better.      We value your input and suggestions.    Thank you for choosing the [facility]!

## 2025-04-28 NOTE — PROGRESS NOTES
Wound Clinic Note          Visit date: 04/28/2025       Cheif Complaint:     Arvin Ta is a 54 year old  male had concerns including Wound Check.        Treatment Plan:    1. Treatment: RLE wound treatment will include irrigation and dressings to promote autolytic debridement which will include: Vashe, hydrofera blue, ABD and rolled gauze  changed by pt every other days    If for some reason the patient is not able to get your dressing(s) changed as outlined above (due to illness, lack of supplies, lack of help) please do the following: remove old, soiled dressings; wash the ulcers with saline; pat dry; apply ABD pad or other absorbant pad and secure with rolled gauze; avoid tape directly on your skin; Patient instructed to call the clinic as soon as possible to let us know what the current issues are in receiving ulcer care.    2. Edema. Elevate your legs above your heart for 20-30 minutes twice daily.     3. Compression- tubigrip    Apply compression stockings in the morning right before getting out of bed.    Remove them at night.    If has tinglining and numbness on lower leg after compression application remove the wraps.     If a 2 layer or 4 layer compression wrap is being used; these are safe to have on for ONLY 7 days. If for some reason the patient is not able to get the wrap(s) changed (due to illness; lack of supplies, lack of help, lack of transportation) please do the following: unwrap the old 2 or 4 layer compression wrap; avoid using scissors as you could cut your skin and cause ulcers; use tubular compression when available. Call to reschedule your home care or clinic visit appointment as soon as possible.    4. Offloading: no direct pressure over the wound.    5. Nutrition: Focus on Protein diet unless on renal diet. Consider protein supplements. Monitor blood glucose and diabetic diet.    7. Referral- Continue to follow with primary for BG management    8. Wound Etiology:  traumatic    9.  Education: importance BG management, keeping the wound covered   Separate from the wound care instructions we then discussed management strategies for lower extremity edema.  I explained the keys for managing lower extremity edema are compression and elevation.  I have again explained to the patient today that controlling the edema is probably the most important thing we can do to help heal the wound.  I have specifically recommended that they lay down with their legs above the level of the heart for 30 minutes at least twice a day.  I emphasized that if we can not control the edema we will likely not be able to get this wound to heal.     I have explained to the patient the importance of protein intake to wound healing.  I have explained that increasing protein intake will speed wound healing.  We discussed several types of food that are high in protein and the wound care nurse gave the patient a handout that summarizes this information.  In addition to further speed wound healing I have encouraged the patient to take a protein supplement.       Patient to return to clinic in 2 week(s) for re-evaluation. They were instructed to call the clinic sooner with any further questions or concerns. Answered all questions.       HISTORY OF PRESENT ILLNESS:      Arvin returns at [facility] today regarding RLE. They arrive to the clinic today alone. The patient reports that the ulcer has been present since 4/4 and was sutured then dehisced, sutures were removed on 4/16/25.  The wound began due to the area being bumped. at work  Is currently using hydrofera blue. There has been Moderate drainage from the wound. Reports no pain;  and has decreased recently. Currently using advil for pain as needed. Is currently using tubi for compression. Denies any fevers, chills, or generalized ill feeling. Positive history of Joint Replacement. L) knee replacement ~ 9 yrs.     Today the patient reports  maintaining a regular diet without special attention to protein.        The patient denies a history of smoking or chronic steroid use.  The patient confirms having diabetes and reports the blood sugars are above 200 at least once a day.  The patient reports taking their diabetes medications as ordered.  The patient reports not taking the diabetes medications as ordered.         The patient has recently had some symptoms of wound infection and is currently taking antibiotics which they have been tolerating well with no symptoms of an adverse reaction.       Previous imagings: 4/19- NO DVT    Problem List:   Past Medical History:   Diagnosis Date    Hypertension     Other psoriasis     Sleep apnea     Type 2 diabetes mellitus without complications (H)     Diabetes mellitus              Family Hx: family history includes Cancer (age of onset: 72) in his father; Diabetes in his mother; Heart Disease in his father.       Surgical Hx:   Past Surgical History:   Procedure Laterality Date    ARTHROPLASTY KNEE UNICOMPARTMENT Left 12/29/2015    Procedure: ARTHROPLASTY KNEE UNICOMPARTMENT;  Surgeon: Lg Meyers MD;  Location:  OR    COLONOSCOPY N/A 8/25/2021    Procedure: COLONOSCOPY, WITH POLYPECTOMY AND BIOPSY;  Surgeon: Lefty Alvarez DO;  Location: [facility] [facility]    ORTHOPEDIC SURGERY  2008    Knee    SOFT TISSUE SURGERY  1995    Shoulder    SOFT TISSUE SURGERY  1986    Elbow    SURGICAL HISTORY OF -   1994    (R) Shoulder    SURGICAL HISTORY OF -   1987 approx    Pins in elbow left          Allergies:  No Known Allergies           Medication History:    Current Outpatient Medications   Medication Sig Dispense Refill    atorvastatin (LIPITOR) 10 MG tablet TAKE 1 TABLET DAILY 90 tablet 3    blood glucose (NO BRAND SPECIFIED) lancets standard Use to test blood sugar 1 times daily or as directed. (Patient not taking: Reported on 4/21/2025) 100 each 11    blood glucose monitoring (NO BRAND SPECIFIED) meter device  kit Use to test blood sugar 1 times daily or as directed. (Patient not taking: Reported on 4/21/2025) 1 kit 0    blood glucose monitoring (NO BRAND SPECIFIED) test strip Use to test blood sugars 1 times daily or as directed (Patient not taking: Reported on 4/21/2025) 100 strip 11    calcipotriene (DOVONOX) 0.005 % external ointment Apply daily to affected areas on body in the evening. (Patient not taking: Reported on 4/16/2025) 120 g 5    cetirizine (ZYRTEC) 10 MG tablet Take 10 mg by mouth daily as needed for allergies (Summer Months (hayfever))  30 tablet 1    clobetasol (TEMOVATE) 0.05 % external ointment Apply topically daily. Apply to Elbows, Legs, and Knees for Psoriasis in the morning. (Patient not taking: Reported on 4/16/2025) 120 g 6    Continuous Blood Gluc  (FREESTYLE BONNIE 2 READER) SATISH Use to read blood sugars as per 's instructions. (Patient not taking: Reported on 4/16/2025) 1 each 0    Continuous Blood Gluc Sensor (FREESTYLE BONNIE 14 DAY SENSOR) MISC Change every 14 days. (Patient not taking: Reported on 4/16/2025) 2 each 5    Continuous Blood Gluc Sensor (FREESTYLE BONNIE 2 SENSOR) MISC Change every 14 days. 2 each 5    EPINEPHrine (ANY BX GENERIC EQUIV) 0.3 MG/0.3ML injection 2-pack Inject 0.3 mLs (0.3 mg) into the muscle once as needed for anaphylaxis. (Patient not taking: Reported on 4/16/2025) 2 each 0    glipiZIDE (GLUCOTROL XL) 10 MG 24 hr tablet Take 1 tablet (10 mg) by mouth daily. 90 tablet 1    lisinopril (ZESTRIL) 20 MG tablet Take 1 tablet (20 mg) by mouth daily. 90 tablet 3    metFORMIN (GLUCOPHAGE XR) 500 MG 24 hr tablet Take 2 tablets (1,000 mg) by mouth 2 times daily (with meals). 360 tablet 1    metroNIDAZOLE (METROCREAM) 0.75 % external cream Apply twice daily to face. (Patient not taking: Reported on 1/29/2025) 45 g 5     Current Facility-Administered Medications   Medication Dose Route Frequency Provider Last Rate Last Admin    lidocaine (XYLOCAINE) 5 %  ointment   Topical Daily PRN Yolande Chavez APRN CNP   Given at 04/28/25 0820         Tobacco History:  reports that he has never smoked. He has never used smokeless tobacco.       REVIEW OF SYMPTOMS:   The review of systems was negative except as noted in the HPI.           PHYSICAL EXAMINATION:     /80   Pulse 80   Resp 20   SpO2 97%            GENERAL: The patient overall appears well and is no acute distress.   HEAD: normocephalic   EYES: Sclera and conjunctiva clear   NECK: no obvious masses   LUNGS: breathing is unlabored.   EXTREMITIES: No clubbing, cyanosis, edema   SKIN: No rashes or other abnormalities except as noted under the Wound section below.   NEUROLOGICAL: normal motor and sensory function          Also see below for wound details:     Circumferential volume measures:            4/21/2025    12:00 PM   Circumferential Measures   Right just above MTP 23.6   Right Ankle 25   Right Widest Calf 36.4     Impression:    Encounter Diagnoses   Name Primary?    Wound dehiscence Yes    Traumatic wound dehiscence, subsequent encounter          Wound location: RLE      4/21 4/28                                                               post debridement    Last photo: 4/28  Wound due to: Trauma  Wound history/plan of care: Trauma at work, evolving.  Wound base: 90/10 % slough and old blood and red non granular tissue, Full thickness,      Palpation of the wound bed: firm and fluctuance, accumulated blood clots under the wound     Drainage: moderate     Description of drainage: serosanguinous     Measurements (length x width x depth, in cm): 9  x 1.5  x  1 cm      Tunneling: N/A     Undermining: 3.4 in the center wound   Periwound skin: Intact, Edematous, and Erythema- blanchable      Color: pale and pink      Temperature: normal   Odor: none  Pain: mild, aching with debridement  Pain interventions prior to  dressing change: patient tolerated well, topical lidocaine, and slow and gentle cares   STATUS: evolving      Ulceration(s)/Wound(s):   Please see the media tab under the chart review for pictures of the wounds.  Nursing staff removed dressings and cleansed wound.    VASC Wound right leg (Active)   Pre Size Length 9 04/28/25 0800   Pre Size Width 1.5 04/28/25 0800   Pre Size Depth 1 04/28/25 0800   Pre Total Sq cm 16.5 04/28/25 0800   Undermined 12-3 oclcock. 3.4 04/28/25 0800            Recent Labs   Lab Test 01/29/25  1007 11/30/23  0816 09/21/22  1050   A1C 9.0* 9.1* 7.6*          Recent Labs   Lab Test 01/29/25  1007 11/30/23  0816 08/29/22  1557   ALBUMIN 4.6 4.8 4.3         WBC   Date Value Ref Range Status   12/22/2015 6.2 4.0 - 11.0 10e9/L Final     WBC Count   Date Value Ref Range Status   11/30/2023 4.1 4.0 - 11.0 10e3/uL Final     Albumin   Date Value Ref Range Status   01/29/2025 4.6 3.5 - 5.2 g/dL Final   08/29/2022 4.3 3.4 - 5.0 g/dL Final   12/22/2015 4.2 3.4 - 5.0 g/dL Final                 ASSESSMENT:   This is a 54 year old  male with traumatic wound on RLE, evolving.         Procedure:         1. Debridement: After discussion of risk factors and verbal consent was obtained 2% Lidocaine HCL jelly was applied, under clean conditions, the RLE ulceration(s) were debrided using currette. Devitalized and nonviable tissue, along with any fibrin and slough, was removed to improve granulation tissue formation, stimulate wound healing, decrease overall bacteria load, disrupt biofilm formation and decrease edge senescence.  Total excisional debridement was 16.5 sq cm from the epidermis/dermis area and into the subcutaneous tissue with a depth of 0.8 cm.   Ulcers were improved afterwards and .  Measures were as noted on the flow sheet and unchanged after debridement.            Yolande March, SHY, FNP-C, CWOCN  04/28/2025   9:28 AM   [facility]  [phone]

## 2025-04-28 NOTE — PROGRESS NOTES
Clinic Administered Medication Documentation    Patient was given lidocaine 5%. Prior to medication administration, verified patient's identity using patient's name and date of birth.    Ansley Hernandez LPN

## 2025-05-08 ENCOUNTER — TELEPHONE (OUTPATIENT)
Dept: FAMILY MEDICINE | Facility: CLINIC | Age: 55
End: 2025-05-08
Payer: COMMERCIAL

## 2025-05-08 NOTE — TELEPHONE ENCOUNTER
Patient Quality Outreach    Patient is due for the following:   Diabetes -  Eye Exam    Action(s) Taken:   No follow up needed at this time.    Type of outreach:    Sent to abstract, was seen on 04/16/25, [facility]     Questions for provider review:    None         Evelyn Suarez, [facility]  Chart routed to None.

## 2025-05-14 ENCOUNTER — TELEPHONE (OUTPATIENT)
Dept: VASCULAR SURGERY | Facility: CLINIC | Age: 55
End: 2025-05-14
Payer: COMMERCIAL

## 2025-05-14 NOTE — TELEPHONE ENCOUNTER
Caller: Arvin    Provider: Yolande March NP    Detailed reason for call: Patient called to cancel his appointment for today and has it rescheduled to Monday. He would like a call back from nursing to advise if this is okay or if he should take the opening in [place] instead. He's rather wait and be seen in [place] though.     Best phone number to contact: [phone]    Best time to contact: any    Ok to leave a detailed message: Yes    Ok to speak to authorized person if needed: No      (Noted to patient if reason is related to wound or incision, to please send a photo via email or [employer].)

## 2025-05-14 NOTE — TELEPHONE ENCOUNTER
Patient informed that it is okay to wait until Monday to be seen in [place]. He should continue his wound care as directed. Educated on signs/symptoms of infection. Sounds like he has developed some fibrinous tissue that the hydrofera blue has been debriding off the wound. He was encouraged to call with any concerns/questions.

## 2025-05-19 ENCOUNTER — OFFICE VISIT (OUTPATIENT)
Dept: WOUND CARE | Facility: CLINIC | Age: 55
End: 2025-05-19
Payer: COMMERCIAL

## 2025-05-19 VITALS
HEART RATE: 70 BPM | RESPIRATION RATE: 18 BRPM | DIASTOLIC BLOOD PRESSURE: 82 MMHG | OXYGEN SATURATION: 97 % | SYSTOLIC BLOOD PRESSURE: 134 MMHG

## 2025-05-19 DIAGNOSIS — T81.33XD TRAUMATIC WOUND DEHISCENCE, SUBSEQUENT ENCOUNTER: ICD-10-CM

## 2025-05-19 DIAGNOSIS — T81.30XA WOUND DEHISCENCE: Primary | ICD-10-CM

## 2025-05-19 PROCEDURE — 3075F SYST BP GE 130 - 139MM HG: CPT

## 2025-05-19 PROCEDURE — 3079F DIAST BP 80-89 MM HG: CPT

## 2025-05-19 PROCEDURE — 1126F AMNT PAIN NOTED NONE PRSNT: CPT

## 2025-05-19 PROCEDURE — 99207 PR DROP WITH A PROCEDURE: CPT | Mod: 25

## 2025-05-19 PROCEDURE — 11042 DBRDMT SUBQ TIS 1ST 20SQCM/<: CPT

## 2025-05-19 RX ADMIN — LIDOCAINE: 50 OINTMENT TOPICAL at 10:22

## 2025-05-19 ASSESSMENT — PAIN SCALES - GENERAL: PAINLEVEL_OUTOF10: NO PAIN (0)

## 2025-05-19 NOTE — PROGRESS NOTES
Compression Applied to Right  Tubigrip Size F     Patient orders their own supplies off [employer]

## 2025-05-19 NOTE — PROGRESS NOTES
Wound Clinic Note          Visit date: 05/19/2025       Cheif Complaint:     Arvin Ta is a 54 year old  male had concerns including Wound Check.        Treatment Plan:    1. Treatment: RLE wound treatment will include irrigation and dressings to promote autolytic debridement which will include: Vashe, hydrofera blue, and foam dressing  changed by pt 3 times a week    If for some reason the patient is not able to get your dressing(s) changed as outlined above (due to illness, lack of supplies, lack of help) please do the following: remove old, soiled dressings; wash the ulcers with saline; pat dry; apply ABD pad or other absorbant pad and secure with rolled gauze; avoid tape directly on your skin; Patient instructed to call the clinic as soon as possible to let us know what the current issues are in receiving ulcer care.    2. Edema. Elevate your legs above your heart for 20-30 minutes twice daily.     3. Compression- tubigrip    Apply compression stockings in the morning right before getting out of bed.    Remove them at night.    If has tinglining and numbness on lower leg after compression application remove the wraps.     If a 2 layer or 4 layer compression wrap is being used; these are safe to have on for ONLY 7 days. If for some reason the patient is not able to get the wrap(s) changed (due to illness; lack of supplies, lack of help, lack of transportation) please do the following: unwrap the old 2 or 4 layer compression wrap; avoid using scissors as you could cut your skin and cause ulcers; use tubular compression when available. Call to reschedule your home care or clinic visit appointment as soon as possible.    4. Offloading: no direct pressure over the wound.    5. Nutrition: Focus on Protein diet unless on renal diet. Consider protein supplements. Monitor blood glucose and diabetic diet.    7. Referral- Continue to follow with primary for BG management, average been 140 mg/dl    8. Wound  Etiology: traumatic    9.  Education: importance BG management, keeping the wound covered   Separate from the wound care instructions we then discussed management strategies for lower extremity edema.  I explained the keys for managing lower extremity edema are compression and elevation.  I have again explained to the patient today that controlling the edema is probably the most important thing we can do to help heal the wound.  I have specifically recommended that they lay down with their legs above the level of the heart for 30 minutes at least twice a day.  I emphasized that if we can not control the edema we will likely not be able to get this wound to heal.     I have explained to the patient the importance of protein intake to wound healing.  I have explained that increasing protein intake will speed wound healing.  We discussed several types of food that are high in protein and the wound care nurse gave the patient a handout that summarizes this information.  In addition to further speed wound healing I have encouraged the patient to take a protein supplement.       Patient to return to clinic in 3 week(s) for re-evaluation. They were instructed to call the clinic sooner with any further questions or concerns. Answered all questions.       HISTORY OF PRESENT ILLNESS:      Arvin returns at [facility] today regarding RLE. They arrive to the clinic today alone. The patient reports that the ulcer has been present since 4/4 and was sutured then dehisced, sutures were removed on 4/16/25.  The wound began due to the area being bumped. at work  Is currently using hydrofera blue, telfa, rolled gazue. There has been Moderate drainage from the wound. Reports no pain;  and has decreased recently. Currently using advil for pain as needed. Is currently using tubi for compression. Denies any fevers, chills, or generalized ill feeling. Positive history of Joint Replacement. L) knee replacement ~ 9 yrs.      Today the patient reports maintaining a regular diet without special attention to protein.        The patient denies a history of smoking or chronic steroid use.  The patient confirms having diabetes and reports the blood sugars are above 200 at least once a day.  The patient reports taking their diabetes medications as ordered.  The patient reports not taking the diabetes medications as ordered.         The patient has not had any symptoms of infection relating to the wound recently and is not currently on antibiotics.       Previous imagings: 4/19- NO DVT    Problem List:   Past Medical History:   Diagnosis Date    Hypertension     Other psoriasis     Sleep apnea     Type 2 diabetes mellitus without complications (H)     Diabetes mellitus              Family Hx: family history includes Cancer (age of onset: 72) in his father; Diabetes in his mother; Heart Disease in his father.       Surgical Hx:   Past Surgical History:   Procedure Laterality Date    ARTHROPLASTY KNEE UNICOMPARTMENT Left 12/29/2015    Procedure: ARTHROPLASTY KNEE UNICOMPARTMENT;  Surgeon: Lg Meyers MD;  Location:  OR    COLONOSCOPY N/A 8/25/2021    Procedure: COLONOSCOPY, WITH POLYPECTOMY AND BIOPSY;  Surgeon: Lefty Alvarez DO;  Location: [facility] [facility]    ORTHOPEDIC SURGERY  2008    Knee    SOFT TISSUE SURGERY  1995    Shoulder    SOFT TISSUE SURGERY  1986    Elbow    SURGICAL HISTORY OF -   1994    (R) Shoulder    SURGICAL HISTORY OF -   1987 approx    Pins in elbow left          Allergies:  No Known Allergies           Medication History:    Current Outpatient Medications   Medication Sig Dispense Refill    atorvastatin (LIPITOR) 10 MG tablet TAKE 1 TABLET DAILY 90 tablet 3    blood glucose monitoring (NO BRAND SPECIFIED) meter device kit Use to test blood sugar 1 times daily or as directed. 1 kit 0    cetirizine (ZYRTEC) 10 MG tablet Take 10 mg by mouth daily as needed for allergies (Summer Months (hayfever))  30 tablet 1     Continuous Blood Gluc Sensor (FREESTYLE BONNIE 2 SENSOR) MISC Change every 14 days. 2 each 5    glipiZIDE (GLUCOTROL XL) 10 MG 24 hr tablet Take 1 tablet (10 mg) by mouth daily. 90 tablet 1    lisinopril (ZESTRIL) 20 MG tablet Take 1 tablet (20 mg) by mouth daily. 90 tablet 3    metFORMIN (GLUCOPHAGE XR) 500 MG 24 hr tablet Take 2 tablets (1,000 mg) by mouth 2 times daily (with meals). 360 tablet 1    blood glucose (NO BRAND SPECIFIED) lancets standard Use to test blood sugar 1 times daily or as directed. (Patient not taking: Reported on 5/19/2025) 100 each 11    blood glucose monitoring (NO BRAND SPECIFIED) test strip Use to test blood sugars 1 times daily or as directed (Patient not taking: Reported on 5/19/2025) 100 strip 11    calcipotriene (DOVONOX) 0.005 % external ointment Apply daily to affected areas on body in the evening. (Patient not taking: Reported on 5/19/2025) 120 g 5    clobetasol (TEMOVATE) 0.05 % external ointment Apply topically daily. Apply to Elbows, Legs, and Knees for Psoriasis in the morning. (Patient not taking: Reported on 5/19/2025) 120 g 6    Continuous Blood Gluc  (FREESTYLE BONNIE 2 READER) SATISH Use to read blood sugars as per 's instructions. (Patient not taking: Reported on 5/19/2025) 1 each 0    Continuous Blood Gluc Sensor (FREESTYLE BONNIE 14 DAY SENSOR) MISC Change every 14 days. (Patient not taking: Reported on 5/19/2025) 2 each 5    EPINEPHrine (ANY BX GENERIC EQUIV) 0.3 MG/0.3ML injection 2-pack Inject 0.3 mLs (0.3 mg) into the muscle once as needed for anaphylaxis. (Patient not taking: Reported on 5/19/2025) 2 each 0    metroNIDAZOLE (METROCREAM) 0.75 % external cream Apply twice daily to face. (Patient not taking: Reported on 5/19/2025) 45 g 5     Current Facility-Administered Medications   Medication Dose Route Frequency Provider Last Rate Last Admin    lidocaine (XYLOCAINE) 5 % ointment   Topical Daily PRN Yolande Chavez, SHY CNP   Given at  04/28/25 0820         Tobacco History:  reports that he has never smoked. He has never used smokeless tobacco.       REVIEW OF SYMPTOMS:   The review of systems was negative except as noted in the HPI.           PHYSICAL EXAMINATION:     /82   Pulse 70   Resp 18   SpO2 97%            GENERAL: The patient overall appears well and is no acute distress.   HEAD: normocephalic   EYES: Sclera and conjunctiva clear   NECK: no obvious masses   LUNGS: breathing is unlabored.   EXTREMITIES: No clubbing, cyanosis, edema   SKIN: No rashes or other abnormalities except as noted under the Wound section below.   NEUROLOGICAL: normal motor and sensory function          Also see below for wound details:     Circumferential volume measures:            4/21/2025    12:00 PM   Circumferential Measures   Right just above MTP 23.6   Right Ankle 25   Right Widest Calf 36.4     Impression:    Encounter Diagnoses   Name Primary?    Wound dehiscence Yes    Traumatic wound dehiscence, subsequent encounter            Wound location: RLE      4/21 4/28                                                               post debridement      5/19  Last photo: 5/19  Wound due to: Trauma  Wound history/plan of care: Trauma at work, evolving.  Wound base: 100 % non-granular tissue, Full thickness,      Palpation of the wound bed: firm over the bone     Drainage: moderate     Description of drainage: serosanguinous     Measurements (length x width x depth, in cm): 7  x 1  x  0.8 cm - healed skin bridge in between the 2 wounds     Tunneling: N/A     Undermining: 3.4 in the center wound   Periwound skin: Intact, Edematous, and Erythema- blanchable      Color: pale and pink      Temperature: normal   Odor: none  Pain: mild, aching with debridement  Pain interventions prior to dressing change: patient tolerated well, topical lidocaine, and slow and gentle cares    STATUS: healing      Ulceration(s)/Wound(s):   Please see the media tab under the chart review for pictures of the wounds.  Nursing staff removed dressings and cleansed wound.    VASC Wound right leg (Active)   Pre Size Length 7 05/19/25 0900   Pre Size Width 1 05/19/25 0900   Pre Size Depth 0.8 05/19/25 0900   Pre Total Sq cm 7 05/19/25 0900            Recent Labs   Lab Test 01/29/25  1007 11/30/23  0816 09/21/22  1050   A1C 9.0* 9.1* 7.6*          Recent Labs   Lab Test 01/29/25  1007 11/30/23  0816 08/29/22  1557   ALBUMIN 4.6 4.8 4.3         WBC   Date Value Ref Range Status   12/22/2015 6.2 4.0 - 11.0 10e9/L Final     WBC Count   Date Value Ref Range Status   11/30/2023 4.1 4.0 - 11.0 10e3/uL Final     Albumin   Date Value Ref Range Status   01/29/2025 4.6 3.5 - 5.2 g/dL Final   08/29/2022 4.3 3.4 - 5.0 g/dL Final   12/22/2015 4.2 3.4 - 5.0 g/dL Final                 ASSESSMENT:   This is a 54 year old  male with traumatic wound on RLE, healing.         Procedure:         1. Debridement: After discussion of risk factors and verbal consent was obtained 2% Lidocaine HCL jelly was applied, under clean conditions, the RLE ulceration(s) were debrided using currette. Devitalized and nonviable tissue, along with any fibrin and slough, was removed to improve granulation tissue formation, stimulate wound healing, decrease overall bacteria load, disrupt biofilm formation and decrease edge senescence.  Total excisional debridement was 7 sq cm from the epidermis/dermis area and into the subcutaneous tissue with a depth of 0.8 cm.   Ulcers were improved afterwards and .  Measures were as noted on the flow sheet and unchanged after debridement.            Yolande March, SHY, FNP-C, CWOCN  05/19/2025   10:09 AM   [facility]  [phone]

## 2025-05-19 NOTE — PATIENT INSTRUCTIONS
Wound care supplies were not ordered or needed today.      Wound Care Instructions    3 times a week and as needed, Cleanse your right leg wound(s) with vashe.    Pat Dry with non-sterile gauze    Apply Lotion to the intact skin surrounding your wound and other dry skin locations. If using an adhesive dressing avoid using lotion near the adhesive.   Some good lotions include: Remedy Skin Repair Cream, Sarna, Vanicream or Cetaphil    Primary Dressing: Apply hydrofera blue into/onto the wounds (If sticking to the wound when you are removing use saline to loosen).    Secondary dressing: 4x4 mepilex foam bandage or any 4x4 silicone foam adhesive bandage     Compression: tubular compression     It is not ok to get your wound wet in the bath or shower    SEEK MEDICAL CARE IF:  You have an increase in swelling, pain, or redness around the wound.  You have an increase in the amount of pus coming from the wound.  There is a bad smell coming from the wound.  The wound appears to be worsening/enlarging  You have a fever greater than 101.5 F      It is ok to continue current wound care treatment/products for the next 2-3 days until new wound care supplies are ordered and arrive. If longer than this please contact our office at [phone].    If for some reason you are not able to get your dressing(s) changed as outlined above (due to illness, lack of supplies, lack of help) please do the following: remove old, soiled dressings; wash the wounds with saline; pat dry; apply ABD pad or other absorbant pad and secure with rolled gauze; avoid tape directly on your skin; Call the clinic as soon as possible to let us know what the current issues are in receiving wound care [phone].    If you have a 2 layer or 4 layer compression wrap on these are safe to have on for ONLY 7 days. If for some reason you are not able to get the wrap(s) changed (due to illness; lack of supplies, lack of help, lack of transportation) please do the  following: unwrap the old 2 or 4 layer compression wrap; avoid using scissors as you could cut your skin and cause wounds; use tubular compression when available. Call to reschedule your home care or clinic visit appointment as soon as possible.    It is recommended that you elevate your legs throughout the day: approx 2-3 times each day  Elevate them above the level of your heart for 30 min.   Ways to do this:   Lay on the couch or your bed and prop your legs up on pillows   Recline back as far as you can go in your recliner and prop your legs on pillows.     Doing these things will help reduce the edema in your legs.      High Protein Foods  When you have an open ulcer, your bodies protein needs are much higher, so it is recommended eat good sources of protein or take a protein supplement!    Protein Supplements  -Premier Protein  -Ensure  -Boost  -Glucerna, if diabetic    Chicken  -Chicken breast, 3.5oz.-30 grams protein  -Chicken meat, cooked, 4 oz.    Beef  -Hamburger abby, 4 oz-28 grams protein  -Steak, 6 oz-42 grams  -Most cuts of beef- 7 grams of protein per ounce    Fish  -Most fish fillets or steaks are about 22 grams of protein for 3 1/2 oz(100 grams) of cooked fish, or 6 grams per ounce  -Tuna, 6 oz can-40 grams of protein    Pork  -Pork chop, average-22 grams protein  -Pork loin or tenderloin, 4 oz.-29 grams  -Ham, 3oz serving- 19 grams  -Wagner, 1 slice-3 grams    Eggs and Dairy  -Egg, large-7 grams  -Milk, 1 cup-8 grams  -Cottage cheese, 1/2 cup-15 grams  -Greek yogurt, 1 cup-usually 8-12 grams, check label    Beans  -Soy milk, 1 cup-6-10 grams  -Most beans(black, wright, lentils, etc.) about 7-10 grams protein per half cup of cooked beans    Nuts and Seeds  -Peanut butter, 2 Tablespoons- 8 grams protein  -Almonds, 1/4 cup- 8 grams  -Peanuts, 1/4 cup-9 grams  -Sunflower seeds, 1/4 cup- 6 grams      Please NOTE: if you are 15 minutes late to your clinic appointment you will have to be rescheduled.  Please call our clinic as soon as possible if you know you will not be able to get to your appointment at [phone].  If you fail to show up to 3 scheduled clinic appointments you will be dismissed from our clinic      We want to hear from you!  In the next few weeks, you should receive a call or email to complete a survey about your visit at [facility]. Please help us improve your appointment experience by letting us know how we did today. We strive to make your experience good and value any ways in which we could do better.      We value your input and suggestions.    Thank you for choosing the [facility]!

## 2025-05-20 ENCOUNTER — PATIENT OUTREACH (OUTPATIENT)
Dept: CARE COORDINATION | Facility: CLINIC | Age: 55
End: 2025-05-20
Payer: COMMERCIAL

## 2025-06-11 ENCOUNTER — OFFICE VISIT (OUTPATIENT)
Dept: WOUND CARE | Facility: CLINIC | Age: 55
End: 2025-06-11
Payer: OTHER MISCELLANEOUS

## 2025-06-11 VITALS
RESPIRATION RATE: 18 BRPM | SYSTOLIC BLOOD PRESSURE: 134 MMHG | HEART RATE: 67 BPM | DIASTOLIC BLOOD PRESSURE: 79 MMHG | OXYGEN SATURATION: 96 %

## 2025-06-11 DIAGNOSIS — T81.30XA WOUND DEHISCENCE: Primary | ICD-10-CM

## 2025-06-11 DIAGNOSIS — T81.33XD TRAUMATIC WOUND DEHISCENCE, SUBSEQUENT ENCOUNTER: ICD-10-CM

## 2025-06-11 PROCEDURE — 99215 OFFICE O/P EST HI 40 MIN: CPT

## 2025-06-11 RX ADMIN — LIDOCAINE: 50 OINTMENT TOPICAL at 09:25

## 2025-06-11 ASSESSMENT — PAIN SCALES - GENERAL: PAINLEVEL_OUTOF10: NO PAIN (0)

## 2025-06-11 NOTE — PATIENT INSTRUCTIONS
Wound care supplies were not ordered or needed today.      Wound Care Instructions    Every 4 days and as needed, Cleanse your right leg wound(s) with vashe.    Pat Dry with non-sterile gauze    Apply a piece of the Hydrocolloid dressing    If needed, then go back the following dressing:    Every other day and as needed, cleanse your right leg wound with vashe    Primary Dressing: Apply hydrofera blue into/onto the wounds (If sticking to the wound when you are removing use saline to loosen).    Secondary dressing: apply guaze, and secure with rolled guaze    Compression: tubular compression     It is not ok to get your wound wet in the bath or shower    SEEK MEDICAL CARE IF:  You have an increase in swelling, pain, or redness around the wound.  You have an increase in the amount of pus coming from the wound.  There is a bad smell coming from the wound.  The wound appears to be worsening/enlarging  You have a fever greater than 101.5 F      It is ok to continue current wound care treatment/products for the next 2-3 days until new wound care supplies are ordered and arrive. If longer than this please contact our office at [phone].    If for some reason you are not able to get your dressing(s) changed as outlined above (due to illness, lack of supplies, lack of help) please do the following: remove old, soiled dressings; wash the wounds with saline; pat dry; apply ABD pad or other absorbant pad and secure with rolled gauze; avoid tape directly on your skin; Call the clinic as soon as possible to let us know what the current issues are in receiving wound care [phone].      It is recommended that you elevate your legs throughout the day: approx 2-3 times each day  Elevate them above the level of your heart for 30 min.   Ways to do this:   Lay on the couch or your bed and prop your legs up on pillows   Recline back as far as you can go in your recliner and prop your legs on pillows.     Doing these things will  help reduce the edema in your legs.      High Protein Foods  When you have an open ulcer, your bodies protein needs are much higher, so it is recommended eat good sources of protein or take a protein supplement!    Protein Supplements  -Premier Protein  -Ensure  -Boost  -Glucerna, if diabetic    Chicken  -Chicken breast, 3.5oz.-30 grams protein  -Chicken meat, cooked, 4 oz.    Beef  -Hamburger abby, 4 oz-28 grams protein  -Steak, 6 oz-42 grams  -Most cuts of beef- 7 grams of protein per ounce    Fish  -Most fish fillets or steaks are about 22 grams of protein for 3 1/2 oz(100 grams) of cooked fish, or 6 grams per ounce  -Tuna, 6 oz can-40 grams of protein    Pork  -Pork chop, average-22 grams protein  -Pork loin or tenderloin, 4 oz.-29 grams  -Ham, 3oz serving- 19 grams  -Wagner, 1 slice-3 grams    Eggs and Dairy  -Egg, large-7 grams  -Milk, 1 cup-8 grams  -Cottage cheese, 1/2 cup-15 grams  -Greek yogurt, 1 cup-usually 8-12 grams, check label    Beans  -Soy milk, 1 cup-6-10 grams  -Most beans(black, wright, lentils, etc.) about 7-10 grams protein per half cup of cooked beans    Nuts and Seeds  -Peanut butter, 2 Tablespoons- 8 grams protein  -Almonds, 1/4 cup- 8 grams  -Peanuts, 1/4 cup-9 grams  -Sunflower seeds, 1/4 cup- 6 grams      Please NOTE: if you are 15 minutes late to your clinic appointment you will have to be rescheduled. Please call our clinic as soon as possible if you know you will not be able to get to your appointment at [phone].  If you fail to show up to 3 scheduled clinic appointments you will be dismissed from our clinic      We want to hear from you!  In the next few weeks, you should receive a call or email to complete a survey about your visit at [facility]. Please help us improve your appointment experience by letting us know how we did today. We strive to make your experience good and value any ways in which we could do better.      We value your input and  suggestions.    Thank you for choosing the [facility]!

## 2025-06-11 NOTE — PROGRESS NOTES
Wound Clinic Note          Visit date: 06/11/2025       Cheif Complaint:     Arvin Ta is a 55 year old  male had concerns including Wound Check (Work comp left leg check).        Treatment Plan:    1. Treatment: RLE wound treatment will include irrigation and dressings to promote autolytic debridement which will include: Vashe, hydrocolloid change every 4 days. If hydrocolloid does not stick well may use hydrofera blue, and gauze dressing.  changed by pt 3 times a week    If for some reason the patient is not able to get your dressing(s) changed as outlined above (due to illness, lack of supplies, lack of help) please do the following: remove old, soiled dressings; wash the ulcers with saline; pat dry; apply ABD pad or other absorbant pad and secure with rolled gauze; avoid tape directly on your skin; Patient instructed to call the clinic as soon as possible to let us know what the current issues are in receiving ulcer care.    2. Edema. Elevate your legs above your heart for 20-30 minutes twice daily.     3. Compression- tubigrip    Apply compression stockings in the morning right before getting out of bed.    Remove them at night.    If has tinglining and numbness on lower leg after compression application remove the wraps.     4. Offloading: no direct pressure over the wound.    5. Nutrition: Focus on Protein diet unless on renal diet. Monitor blood glucose and diabetic diet.    7. Referral- Continue to follow with primary for BG management    8. Wound Etiology: traumatic    9.  Education: importance BG management, keeping the wound covered   Separate from the wound care instructions we then discussed management strategies for lower extremity edema.  I explained the keys for managing lower extremity edema are compression and elevation.  I have again explained to the patient today that controlling the edema is probably the most important thing we can do to help heal the wound.  I have specifically  recommended that they lay down with their legs above the level of the heart for 30 minutes at least twice a day.  I emphasized that if we can not control the edema we will likely not be able to get this wound to heal.     I have explained to the patient the importance of protein intake to wound healing.  I have explained that increasing protein intake will speed wound healing.  We discussed several types of food that are high in protein and the wound care nurse gave the patient a handout that summarizes this information.  In addition to further speed wound healing I have encouraged the patient to take a protein supplement.       Patient to return to clinic in 3 week(s) for re-evaluation. They were instructed to call the clinic sooner with any further questions or concerns. Answered all questions.       HISTORY OF PRESENT ILLNESS:      Arvin returns at [facility] today regarding RLE, also has a hx of T2DM, and CKD stage 1. They arrive to the clinic today alone walks without any assistive device. The patient reports that the ulcer has been present since 4/4 and was sutured then dehisced, sutures were removed on 4/16/25.  The wound began due to the area being bumped. at work  Is currently using hydrofera blue, foam dressing. Has psoriasis and was scratching around the wound, has superficial wound. There has been Moderate drainage from the wound. Reports no pain;  and has decreased recently. Currently using advil for pain as needed. Is currently using tubi for compression. Denies any fevers, chills, or generalized ill feeling. Positive history of Joint Replacement. L) knee replacement ~ 9 yrs.         Previous imagings: 4/19- NO DVT    Problem List:   Past Medical History:   Diagnosis Date    Hypertension     Other psoriasis     Sleep apnea     Type 2 diabetes mellitus without complications (H)     Diabetes mellitus           Family Hx: family history includes Cancer (age of onset: 72) in his father;  Diabetes in his mother; Heart Disease in his father.       Surgical Hx:   Past Surgical History:   Procedure Laterality Date    ARTHROPLASTY KNEE UNICOMPARTMENT Left 12/29/2015    Procedure: ARTHROPLASTY KNEE UNICOMPARTMENT;  Surgeon: Lg Meyers MD;  Location: [facility]    COLONOSCOPY N/A 8/25/2021    Procedure: COLONOSCOPY, WITH POLYPECTOMY AND BIOPSY;  Surgeon: Lefty Alvarez DO;  Location: [facility]    ORTHOPEDIC SURGERY  2008    Knee    SOFT TISSUE SURGERY  1995    Shoulder    SOFT TISSUE SURGERY  1986    Elbow    SURGICAL HISTORY OF -   1994    (R) Shoulder    SURGICAL HISTORY OF -   1987 approx    Pins in elbow left          Allergies:  No Known Allergies           Medication History:    Current Outpatient Medications   Medication Sig Dispense Refill    atorvastatin (LIPITOR) 10 MG tablet TAKE 1 TABLET DAILY 90 tablet 3    blood glucose (NO BRAND SPECIFIED) lancets standard Use to test blood sugar 1 times daily or as directed. (Patient not taking: Reported on 5/19/2025) 100 each 11    blood glucose monitoring (NO BRAND SPECIFIED) meter device kit Use to test blood sugar 1 times daily or as directed. 1 kit 0    blood glucose monitoring (NO BRAND SPECIFIED) test strip Use to test blood sugars 1 times daily or as directed (Patient not taking: Reported on 5/19/2025) 100 strip 11    calcipotriene (DOVONOX) 0.005 % external ointment Apply daily to affected areas on body in the evening. (Patient not taking: Reported on 5/19/2025) 120 g 5    cetirizine (ZYRTEC) 10 MG tablet Take 10 mg by mouth daily as needed for allergies (Summer Months (hayfever))  30 tablet 1    clobetasol (TEMOVATE) 0.05 % external ointment Apply topically daily. Apply to Elbows, Legs, and Knees for Psoriasis in the morning. (Patient not taking: Reported on 5/19/2025) 120 g 6    Continuous Blood Gluc  (FREESTYLE BONNIE 2 READER) SATISH Use to read blood sugars as per 's instructions. (Patient not taking: Reported on  5/19/2025) 1 each 0    Continuous Blood Gluc Sensor (FREESTYLE BONNIE 14 DAY SENSOR) MISC Change every 14 days. (Patient not taking: Reported on 5/19/2025) 2 each 5    Continuous Blood Gluc Sensor (FREESTYLE BONNIE 2 SENSOR) MISC Change every 14 days. 2 each 5    EPINEPHrine (ANY BX GENERIC EQUIV) 0.3 MG/0.3ML injection 2-pack Inject 0.3 mLs (0.3 mg) into the muscle once as needed for anaphylaxis. (Patient not taking: Reported on 5/19/2025) 2 each 0    glipiZIDE (GLUCOTROL XL) 10 MG 24 hr tablet Take 1 tablet (10 mg) by mouth daily. 90 tablet 1    lisinopril (ZESTRIL) 20 MG tablet Take 1 tablet (20 mg) by mouth daily. 90 tablet 3    metFORMIN (GLUCOPHAGE XR) 500 MG 24 hr tablet Take 2 tablets (1,000 mg) by mouth 2 times daily (with meals). 360 tablet 1    metroNIDAZOLE (METROCREAM) 0.75 % external cream Apply twice daily to face. (Patient not taking: Reported on 5/19/2025) 45 g 5     Current Facility-Administered Medications   Medication Dose Route Frequency Provider Last Rate Last Admin    lidocaine (XYLOCAINE) 5 % ointment   Topical Daily PRN Yolande Chavez APRN CNP   Given at 06/11/25 0925         Tobacco History:  reports that he has never smoked. He has never used smokeless tobacco.       REVIEW OF SYMPTOMS:   The review of systems was negative except as noted in the HPI.           PHYSICAL EXAMINATION:     /79   Pulse 67   Resp 18   SpO2 96%            GENERAL: The patient overall appears well and is no acute distress.   HEAD: normocephalic   EYES: Sclera and conjunctiva clear   NECK: no obvious masses   LUNGS: breathing is unlabored.   EXTREMITIES: No clubbing, cyanosis, edema   SKIN: No rashes or other abnormalities except as noted under the Wound section below.   NEUROLOGICAL: normal motor and sensory function          Also see below for wound details:     Circumferential volume measures:            4/21/2025    12:00 PM   Circumferential Measures   Right just above MTP 23.6   Right Ankle  25   Right Widest Calf 36.4     Impression:    Encounter Diagnoses   Name Primary?    Wound dehiscence Yes    Traumatic wound dehiscence, subsequent encounter          Wound location: RLE      4/21 4/28                                                               post debridement      5/19 6/11                    Last photo: 6/11  Wound due to: Trauma  Wound history/plan of care: Trauma at work,   Wound base: 100 % non-granular tissue, Full thickness,      Palpation of the wound bed: firm over the bone     Drainage: moderate     Description of drainage: serosanguinous     Measurements (length x width x depth, in cm): 0.5  x 0.3  x  0.3 cm     Tunneling: N/A     Undermining: healed  Periwound skin: Erythema- blanchable and Superficial erosion from scratching      Color: pale and pink      Temperature: normal   Odor: none  Pain: denies ,    Pain interventions prior to dressing change: patient tolerated well, topical lidocaine, and slow and gentle cares   STATUS: healing      Ulceration(s)/Wound(s):   Please see the media tab under the chart review for pictures of the wounds.  Nursing staff removed dressings and cleansed wound.    VASC Wound right leg (Active)   Pre Size Length 0.5 06/11/25 0914   Pre Size Width 0.3 06/11/25 0914   Pre Size Depth 0.2 06/11/25 0914   Pre Total Sq cm 0.15 06/11/25 0914       VASC Wound right lateral leg (Active)   Pre Size Length 3 06/11/25 0914   Pre Size Width 2.5 06/11/25 0914   Pre Size Depth 0.1 06/11/25 0914   Description 2 spots 06/11/25 0914           Recent Labs   Lab Test 01/29/25  1007 11/30/23  0816 09/21/22  1050   A1C 9.0* 9.1* 7.6*          Recent Labs   Lab Test 01/29/25  1007 11/30/23  0816 08/29/22  1557   ALBUMIN 4.6 4.8 4.3         WBC   Date Value Ref Range Status   12/22/2015 6.2 4.0 - 11.0 10e9/L Final     WBC Count   Date Value Ref Range Status   11/30/2023 4.1 4.0 - 11.0 10e3/uL  Final     Albumin   Date Value Ref Range Status   01/29/2025 4.6 3.5 - 5.2 g/dL Final   08/29/2022 4.3 3.4 - 5.0 g/dL Final   12/22/2015 4.2 3.4 - 5.0 g/dL Final              ASSESSMENT:   This is a 55 year old  male with traumatic wound on RLE, healing.         The longitudinal plan of care for the diagnosis(es)/condition(s) as documented were addressed during this visit. Due to the added complexity in care, I will continue to support Vic in the subsequent management and with ongoing continuity of care.          SHY Brink, FNP-C, CWOCN  06/11/2025   10:35 AM   [facility]  [phone]

## 2025-07-09 DIAGNOSIS — E11.21 TYPE 2 DIABETES MELLITUS WITH DIABETIC NEPHROPATHY, WITHOUT LONG-TERM CURRENT USE OF INSULIN (H): ICD-10-CM

## 2025-07-10 RX ORDER — METFORMIN HYDROCHLORIDE 500 MG/1
1000 TABLET, EXTENDED RELEASE ORAL 2 TIMES DAILY WITH MEALS
Qty: 360 TABLET | Refills: 0 | Status: SHIPPED | OUTPATIENT
Start: 2025-07-10

## 2025-07-12 ENCOUNTER — HEALTH MAINTENANCE LETTER (OUTPATIENT)
Age: 55
End: 2025-07-12

## 2025-08-02 ENCOUNTER — HEALTH MAINTENANCE LETTER (OUTPATIENT)
Age: 55
End: 2025-08-02

## (undated) RX ORDER — PROPOFOL 10 MG/ML
INJECTION, EMULSION INTRAVENOUS
Status: DISPENSED
Start: 2021-08-25

## (undated) RX ORDER — LIDOCAINE HYDROCHLORIDE 10 MG/ML
INJECTION, SOLUTION EPIDURAL; INFILTRATION; INTRACAUDAL; PERINEURAL
Status: DISPENSED
Start: 2021-08-25